# Patient Record
Sex: MALE | Race: WHITE | Employment: UNEMPLOYED | URBAN - METROPOLITAN AREA
[De-identification: names, ages, dates, MRNs, and addresses within clinical notes are randomized per-mention and may not be internally consistent; named-entity substitution may affect disease eponyms.]

---

## 2017-01-12 ENCOUNTER — OFFICE VISIT (OUTPATIENT)
Dept: INTERNAL MEDICINE CLINIC | Age: 57
End: 2017-01-12

## 2017-01-12 VITALS
TEMPERATURE: 97.5 F | OXYGEN SATURATION: 96 % | RESPIRATION RATE: 14 BRPM | SYSTOLIC BLOOD PRESSURE: 137 MMHG | WEIGHT: 199 LBS | HEIGHT: 68 IN | DIASTOLIC BLOOD PRESSURE: 95 MMHG | BODY MASS INDEX: 30.16 KG/M2 | HEART RATE: 72 BPM

## 2017-01-12 DIAGNOSIS — F17.200 SMOKER: ICD-10-CM

## 2017-01-12 DIAGNOSIS — I10 ESSENTIAL HYPERTENSION WITH GOAL BLOOD PRESSURE LESS THAN 140/90: Primary | ICD-10-CM

## 2017-01-12 DIAGNOSIS — K21.9 GASTROESOPHAGEAL REFLUX DISEASE WITHOUT ESOPHAGITIS: ICD-10-CM

## 2017-01-12 DIAGNOSIS — E78.2 HYPERLIPEMIA, MIXED: ICD-10-CM

## 2017-01-12 RX ORDER — OMEPRAZOLE 20 MG/1
20 CAPSULE, DELAYED RELEASE ORAL DAILY
Qty: 30 CAP | Refills: 5 | Status: SHIPPED | OUTPATIENT
Start: 2017-01-12 | End: 2017-03-16

## 2017-01-12 RX ORDER — OXYCODONE AND ACETAMINOPHEN 5; 325 MG/1; MG/1
1 TABLET ORAL
COMMUNITY
End: 2017-04-18

## 2017-01-12 RX ORDER — NAPROXEN SODIUM 220 MG
440 TABLET ORAL DAILY
COMMUNITY
End: 2018-05-29

## 2017-01-12 RX ORDER — FAMOTIDINE 20 MG/1
20 TABLET, FILM COATED ORAL DAILY
COMMUNITY
End: 2017-01-12 | Stop reason: ALTCHOICE

## 2017-01-12 NOTE — PROGRESS NOTES
Reviewed record In preparation for visit and have obtained necessary documentation. Has info on advanced directive but has not filled them out. 1. Have you been to the ER, urgent care clinic or hospitalized since your last visit? No     2. Have you seen or consulted any other health care providers outside of the [address] [place] [address] since your last visit? Include any pap smears or colon screening. Dr Delmer Martínez Maintenance reviewed: records for colonoscopy refaxed to Dr Oh Thompson.

## 2017-01-12 NOTE — PATIENT INSTRUCTIONS
Stop famotidine (Pepcid) and start omeprazole (Prilosec) once a day at least 30 minutes before breakfast.   Restart lisinopril/hydrochlorothiazide for blood pressure  Return in 2 months. High Blood Pressure: Care Instructions  Your Care Instructions  If your blood pressure is usually above 140/90, you have high blood pressure, or hypertension. That means the top number is 140 or higher or the bottom number is 90 or higher, or both. Despite what a lot of people think, high blood pressure usually doesn't cause headaches or make you feel dizzy or lightheaded. It usually has no symptoms. But it does increase your risk for heart attack, stroke, and kidney or eye damage. The higher your blood pressure, the more your risk increases. Your doctor will give you a goal for your blood pressure. Your goal will be based on your health and your age. An example of a goal is to keep your blood pressure below 140/90. Lifestyle changes, such as eating healthy and being active, are always important to help lower blood pressure. You might also take medicine to reach your blood pressure goal.  Follow-up care is a key part of your treatment and safety. Be sure to make and go to all appointments, and call your doctor if you are having problems. It's also a good idea to know your test results and keep a list of the medicines you take. How can you care for yourself at home? Medical treatment  · If you stop taking your medicine, your blood pressure will go back up. You may take one or more types of medicine to lower your blood pressure. Be safe with medicines. Take your medicine exactly as prescribed. Call your doctor if you think you are having a problem with your medicine. · Talk to your doctor before you start taking aspirin every day. Aspirin can help certain people lower their risk of a heart attack or stroke. But taking aspirin isn't right for everyone, because it can cause serious bleeding.   · See your doctor regularly. You may need to see the doctor more often at first or until your blood pressure comes down. · If you are taking blood pressure medicine, talk to your doctor before you take decongestants or anti-inflammatory medicine, such as ibuprofen. Some of these medicines can raise blood pressure. · Learn how to check your blood pressure at home. Lifestyle changes  · Stay at a healthy weight. This is especially important if you put on weight around the waist. Losing even 10 pounds can help you lower your blood pressure. · If your doctor recommends it, get more exercise. Walking is a good choice. Bit by bit, increase the amount you walk every day. Try for at least 30 minutes on most days of the week. You also may want to swim, bike, or do other activities. · Avoid or limit alcohol. Talk to your doctor about whether you can drink any alcohol. · Try to limit how much sodium you eat to less than 2,300 milligrams (mg) a day. Your doctor may ask you to try to eat less than 1,500 mg a day. · Eat plenty of fruits (such as bananas and oranges), vegetables, legumes, whole grains, and low-fat dairy products. · Lower the amount of saturated fat in your diet. Saturated fat is found in animal products such as milk, cheese, and meat. Limiting these foods may help you lose weight and also lower your risk for heart disease. · Do not smoke. Smoking increases your risk for heart attack and stroke. If you need help quitting, talk to your doctor about stop-smoking programs and medicines. These can increase your chances of quitting for good. When should you call for help? Call 911 anytime you think you may need emergency care. This may mean having symptoms that suggest that your blood pressure is causing a serious heart or blood vessel problem. Your blood pressure may be over 180/110. For example, call 911 if:  · You have symptoms of a heart attack.  These may include:  ¨ Chest pain or pressure, or a strange feeling in the chest.  ¨ Sweating. ¨ Shortness of breath. ¨ Nausea or vomiting. ¨ Pain, pressure, or a strange feeling in the back, neck, jaw, or upper belly or in one or both shoulders or arms. ¨ Lightheadedness or sudden weakness. ¨ A fast or irregular heartbeat. · You have symptoms of a stroke. These may include:  ¨ Sudden numbness, tingling, weakness, or loss of movement in your face, arm, or leg, especially on only one side of your body. ¨ Sudden vision changes. ¨ Sudden trouble speaking. ¨ Sudden confusion or trouble understanding simple statements. ¨ Sudden problems with walking or balance. ¨ A sudden, severe headache that is different from past headaches. · You have severe back or belly pain. Do not wait until your blood pressure comes down on its own. Get help right away. Call your doctor now or seek immediate care if:  · Your blood pressure is much higher than normal (such as 180/110 or higher), but you don't have symptoms. · You think high blood pressure is causing symptoms, such as:  ¨ Severe headache. ¨ Blurry vision. Watch closely for changes in your health, and be sure to contact your doctor if:  · Your blood pressure measures 140/90 or higher at least 2 times. That means the top number is 140 or higher or the bottom number is 90 or higher, or both. · You think you may be having side effects from your blood pressure medicine. · Your blood pressure is usually normal, but it goes above normal at least 2 times. Where can you learn more? Go to http://nehemiah-nadia.info/. Enter L283 in the search box to learn more about \"High Blood Pressure: Care Instructions. \"  Current as of: August 8, 2016  Content Version: 11.1  © 8649-2147 [facility]. Care instructions adapted under license by [employer] (which disclaims liability or warranty for this information).  If you have questions about a medical condition or this instruction, always ask your healthcare professional. [address] any warranty or liability for your use of this information.

## 2017-01-12 NOTE — MR AVS SNAPSHOT
Visit Information Date & Time Provider Department Dept. Phone Encounter #  
 1/12/2017  1:30 PM Cory Gant MD [facility] [phone] [id number] Follow-up Instructions Return in about 2 months (around 3/12/2017) for HTN, GERD, chol. Maryjo Diaz Upcoming Health Maintenance Date Due COLONOSCOPY 11/30/1977 DTaP/Tdap/Td series (2 - Td) 4/18/2026 Allergies as of 1/12/2017  Review Complete On: 1/12/2017 By: Cory Gant MD  
  
 Severity Noted Reaction Type Reactions Advil [Ibuprofen]  05/30/2011    Hives Lovastatin  01/12/2017    Diarrhea Tylenol [Acetaminophen]  05/30/2011    Hives Current Immunizations  Reviewed on 1/12/2017 Name Date Influenza Vaccine (Quad) PF 9/20/2016 Pneumococcal Polysaccharide (PPSV-23) 11/11/2016 Tdap 4/18/2016 Reviewed by Maren Valverde LPN on 6/79/4859 at  1:26 PM  
You Were Diagnosed With   
  
 Codes Comments Essential hypertension with goal blood pressure less than 140/90    -  Primary ICD-10-CM: I10 
ICD-9-CM: 401.9 Hyperlipemia, mixed     ICD-10-CM: E78.2 ICD-9-CM: 272.2 Gastroesophageal reflux disease without esophagitis     ICD-10-CM: K21.9 ICD-9-CM: 530.81 Smoker     ICD-10-CM: F19.932 ICD-9-CM: 305.1 Vitals BP Pulse Temp Resp Height(growth percentile) Weight(growth percentile) (!) 137/95 (BP 1 Location: Left arm, BP Patient Position: Sitting) 72 97.5 °F (36.4 °C) (Oral) 14 5' 8\" (1.727 m) 199 lb (90.3 kg) SpO2 BMI Smoking Status 96% 30.26 kg/m2 Current Every Day Smoker BMI and BSA Data Body Mass Index Body Surface Area  
 30.26 kg/m 2 2.08 m 2 Preferred Pharmacy Pharmacy Name Phone [facility] [address] [place], [place] - [address] [place] [address] [place] [phone] Your Updated Medication List  
  
   
This list is accurate as of: 1/12/17  2:17 PM.  Always use your most recent med list.  
  
  
  
  
 ALEVE 220 mg tablet Generic drug:  naproxen sodium Take 440 mg by mouth daily. bismuth subsalicylate 552 UR/93 mL Susp Commonly known as:  PEPTO-BISMOL MAXIMUM STRENGTH Take 30 mL by mouth every six (6) hours as needed. calcium carbonate 200 mg calcium (500 mg) Chew Commonly known as:  TUMS Take 1 Tab by mouth daily. lisinopril-hydroCHLOROthiazide 20-12.5 mg per tablet Commonly known as:  Katrinka Fat Take 1 Tab by mouth daily. omeprazole 20 mg capsule Commonly known as:  PRILOSEC Take 1 Cap by mouth daily. oxyCODONE-acetaminophen 5-325 mg per tablet Commonly known as:  PERCOCET Take 1 Tab by mouth every twelve (12) hours as needed for Pain. Prescriptions Sent to Pharmacy Refills  
 omeprazole (PRILOSEC) 20 mg capsule 5 Sig: Take 1 Cap by mouth daily. Class: Normal  
 Pharmacy: [id number] [place] [address], [address] [place] [address] #: [phone] Route: Oral  
  
Follow-up Instructions Return in about 2 months (around 3/12/2017) for HTN, GERD, chol. .  
  
  
Patient Instructions Stop famotidine (Pepcid) and start omeprazole (Prilosec) once a day at least 30 minutes before breakfast.  
Restart lisinopril/hydrochlorothiazide for blood pressure Return in 2 months. High Blood Pressure: Care Instructions Your Care Instructions If your blood pressure is usually above 140/90, you have high blood pressure, or hypertension. That means the top number is 140 or higher or the bottom number is 90 or higher, or both. Despite what a lot of people think, high blood pressure usually doesn't cause headaches or make you feel dizzy or lightheaded. It usually has no symptoms. But it does increase your risk for heart attack, stroke, and kidney or eye damage. The higher your blood pressure, the more your risk increases. Your doctor will give you a goal for your blood pressure.  Your goal will be based on your health and your age. An example of a goal is to keep your blood pressure below 140/90. Lifestyle changes, such as eating healthy and being active, are always important to help lower blood pressure. You might also take medicine to reach your blood pressure goal. 
Follow-up care is a key part of your treatment and safety. Be sure to make and go to all appointments, and call your doctor if you are having problems. It's also a good idea to know your test results and keep a list of the medicines you take. How can you care for yourself at home? Medical treatment · If you stop taking your medicine, your blood pressure will go back up. You may take one or more types of medicine to lower your blood pressure. Be safe with medicines. Take your medicine exactly as prescribed. Call your doctor if you think you are having a problem with your medicine. · Talk to your doctor before you start taking aspirin every day. Aspirin can help certain people lower their risk of a heart attack or stroke. But taking aspirin isn't right for everyone, because it can cause serious bleeding. · See your doctor regularly. You may need to see the doctor more often at first or until your blood pressure comes down. · If you are taking blood pressure medicine, talk to your doctor before you take decongestants or anti-inflammatory medicine, such as ibuprofen. Some of these medicines can raise blood pressure. · Learn how to check your blood pressure at home. Lifestyle changes · Stay at a healthy weight. This is especially important if you put on weight around the waist. Losing even 10 pounds can help you lower your blood pressure. · If your doctor recommends it, get more exercise. Walking is a good choice. Bit by bit, increase the amount you walk every day. Try for at least 30 minutes on most days of the week. You also may want to swim, bike, or do other activities. · Avoid or limit alcohol. Talk to your doctor about whether you can drink any alcohol. · Try to limit how much sodium you eat to less than 2,300 milligrams (mg) a day. Your doctor may ask you to try to eat less than 1,500 mg a day. · Eat plenty of fruits (such as bananas and oranges), vegetables, legumes, whole grains, and low-fat dairy products. · Lower the amount of saturated fat in your diet. Saturated fat is found in animal products such as milk, cheese, and meat. Limiting these foods may help you lose weight and also lower your risk for heart disease. · Do not smoke. Smoking increases your risk for heart attack and stroke. If you need help quitting, talk to your doctor about stop-smoking programs and medicines. These can increase your chances of quitting for good. When should you call for help? Call 911 anytime you think you may need emergency care. This may mean having symptoms that suggest that your blood pressure is causing a serious heart or blood vessel problem. Your blood pressure may be over 180/110. For example, call 911 if: 
· You have symptoms of a heart attack. These may include: ¨ Chest pain or pressure, or a strange feeling in the chest. 
¨ Sweating. ¨ Shortness of breath. ¨ Nausea or vomiting. ¨ Pain, pressure, or a strange feeling in the back, neck, jaw, or upper belly or in one or both shoulders or arms. ¨ Lightheadedness or sudden weakness. ¨ A fast or irregular heartbeat. · You have symptoms of a stroke. These may include: 
¨ Sudden numbness, tingling, weakness, or loss of movement in your face, arm, or leg, especially on only one side of your body. ¨ Sudden vision changes. ¨ Sudden trouble speaking. ¨ Sudden confusion or trouble understanding simple statements. ¨ Sudden problems with walking or balance. ¨ A sudden, severe headache that is different from past headaches. · You have severe back or belly pain. Do not wait until your blood pressure comes down on its own. Get help right away. Call your doctor now or seek immediate care if: 
· Your blood pressure is much higher than normal (such as 180/110 or higher), but you don't have symptoms. · You think high blood pressure is causing symptoms, such as: ¨ Severe headache. ¨ Blurry vision. Watch closely for changes in your health, and be sure to contact your doctor if: 
· Your blood pressure measures 140/90 or higher at least 2 times. That means the top number is 140 or higher or the bottom number is 90 or higher, or both. · You think you may be having side effects from your blood pressure medicine. · Your blood pressure is usually normal, but it goes above normal at least 2 times. Where can you learn more? Go to http://nehemiah-nadia.info/. Enter G934 in the search box to learn more about \"High Blood Pressure: Care Instructions. \" Current as of: August 8, 2016 Content Version: 11.1 © 3132-0828 [employer]. Care instructions adapted under license by [employer] (which disclaims liability or warranty for this information). If you have questions about a medical condition or this instruction, always ask your healthcare professional. [place] [address] any warranty or liability for your use of this information. Introducing [facility]! Chay Mclean introduces [email or web address] patient portal. Now you can access parts of your medical record, email your doctor's office, and request medication refills online. 1. In your internet browser, go to [email or web address] 2. Click on the First Time User? Click Here link in the Sign In box. You will see the New Member Sign Up page. 3. Enter your [email or web address] Access Code exactly as it appears below. You will not need to use this code after youve completed the sign-up process.  If you do not sign up before the expiration date, you must request a new code. 
 
· [employer] Access Code: [email or web address] Expires: 4/12/2017  2:15 PM 
 
4. Enter the last four digits of your Social Security Number (xxxx) and Date of Birth (mm/dd/yyyy) as indicated and click Submit. You will be taken to the next sign-up page. 5. Create a [employer] ID. This will be your [employer] login ID and cannot be changed, so think of one that is secure and easy to remember. 6. Create a [employer] password. You can change your password at any time. 7. Enter your Password Reset Question and Answer. This can be used at a later time if you forget your password. 8. Enter your e-mail address. You will receive e-mail notification when new information is available in [address] 9. Click Sign Up. You can now view and download portions of your medical record. 10. Click the Download Summary menu link to download a portable copy of your medical information. If you have questions, please visit the Frequently Asked Questions section of the [employer] website. Remember, [employer] is NOT to be used for urgent needs. For medical emergencies, dial 911. Now available from your iPhone and Android! Please provide this summary of care documentation to your next provider. Your primary care clinician is listed as Adarsh Camacho. If you have any questions after today's visit, please call [phone].

## 2017-01-12 NOTE — PROGRESS NOTES
HISTORY OF PRESENT ILLNESS  Aileen Lin is a 61 y.o. male. HPI  CARDIOVASCULAR  He presents for follow up of hypertension and hyperlipidemia. He had previously stopped taking lovastatin and lisinopril/hydrochlorothiazide due to development of diarrhea. At his last visit we recommended restarting lisinopril hydrochlorothiazide, as diarrhea would not be a side effect. He did not restart the lisinopril/ hydrochlorothiazide. The hope was to reconsider another statin today. Diet and Lifestyle: generally follows a low fat low cholesterol diet, generally follows a low sodium diet, sedentary, smoker 1 ppd   Medication compliance: did not restart lisinopril/hydrochlorothiazde. Medication side effects: none  Home BP Monitoring: is not measured at home. Cardiovascular ROS:  He denies palpitations, orthopnea, exertional chest pressure/discomfort, claudication, lower extremity edema, dyspnea on exertion, dizziness     GERD  He presents for follow up of gastroesophageal reflux. This has been associated with heartburn. Symptoms not controlled on Pepcid. Adding Tums prn. He denies dysphagia, has not lost weight. He continues to complain of pain in the left leg. He previously sustained a compound comminuted fracture of the tibia when he fell from a ladder. Had surgery for this twice. His orthopedic doctor is no longer prescribing pain medication for him. And he is asking that we do so.     Patient Active Problem List   Diagnosis Code    Adenoma of right adrenal gland D35.01    Pulmonary nodules/lesions, multiple R91.8    Smoker F17.200    GERD (gastroesophageal reflux disease) K21.9    Osteoarthritis of left knee M17.9    COPD (chronic obstructive pulmonary disease) ([facility] [address] J44.9    Depression with anxiety F41.8    Hyperlipemia, mixed E78.2    Essential hypertension with goal blood pressure less than 140/90 I10     Past Medical History   Diagnosis Date    Adrenal mass, right ([address] 2014     Seen on abd CT  CAD (coronary artery disease)      calcific atherosclerosis on CT    Hypercholesterolemia     Hypertension     Multiple lung nodules      Past Surgical History   Procedure Laterality Date    Hx orthopaedic       hand    Hx orthopaedic       foot     Social History     Social History    Marital status: SINGLE     Spouse name: N/A    Number of children: N/A    Years of education: N/A     Social History Main Topics    Smoking status: Current Every Day Smoker     Packs/day: 1.00     Types: Cigarettes    Smokeless tobacco: Never Used    Alcohol use No      Comment: stopped jan 2014    Drug use: No    Sexual activity: Not Asked     Other Topics Concern    None     Social History Narrative     Family History   Problem Relation Age of Onset    Heart Disease Mother     Asthma Mother     Elevated Lipids Mother     Hypertension Mother      Allergies   Allergen Reactions    Advil [Ibuprofen] Hives    Lovastatin Diarrhea    Tylenol [Acetaminophen] Hives     Current Outpatient Prescriptions   Medication Sig Dispense Refill    oxyCODONE-acetaminophen (PERCOCET) 5-325 mg per tablet Take 1 Tab by mouth every twelve (12) hours as needed for Pain.  famotidine (PEPCID) 20 mg tablet Take 20 mg by mouth daily.  bismuth subsalicylate (PEPTO-BISMOL MAXIMUM STRENGTH) 525 mg/15 mL susp Take 30 mL by mouth every six (6) hours as needed.  calcium carbonate (TUMS) 200 mg calcium (500 mg) chew Take 1 Tab by mouth daily.  lisinopril-hydrochlorothiazide (PRINZIDE, ZESTORETIC) 20-12.5 mg per tablet Take 1 Tab by mouth daily. 90 Tab 3               Review of Systems   Constitutional: Negative for malaise/fatigue and weight loss. Gastrointestinal: Negative for constipation, diarrhea and heartburn. Musculoskeletal: Negative for back pain and joint pain. Pain in left leg. Constant, burning pain in lower leg. Ortho gave him Percocet with instructions to wean.     Neurological: Positive for tingling (and burning in lower left leg.). Negative for dizziness and focal weakness. Visit Vitals    BP (!) 137/95 (BP 1 Location: Left arm, BP Patient Position: Sitting)    Pulse 72    Temp 97.5 °F (36.4 °C) (Oral)    Resp 14    Ht 5' 8\" (1.727 m)    Wt 199 lb (90.3 kg)    SpO2 96%    BMI 30.26 kg/m2     Physical Exam   Constitutional: He is oriented to person, place, and time. He appears well-developed and well-nourished. HENT:   Head: Normocephalic and atraumatic. Eyes: Conjunctivae are normal. Pupils are equal, round, and reactive to light. Neck: Neck supple. Carotid bruit is not present. No thyromegaly present. Cardiovascular: Normal rate, regular rhythm and normal heart sounds. PMI is not displaced. Exam reveals no gallop. No murmur heard. Pulses:       Dorsalis pedis pulses are 2+ on the right side, and 2+ on the left side. Posterior tibial pulses are 2+ on the right side, and 2+ on the left side. Pulmonary/Chest: Effort normal. He has no wheezes. He has no rhonchi. He has no rales. Abdominal: Soft. Normal appearance. He exhibits no abdominal bruit and no mass. There is no hepatosplenomegaly. There is no tenderness. Musculoskeletal: He exhibits no edema. Lymphadenopathy:     He has no cervical adenopathy. Right: No supraclavicular adenopathy present. Left: No inguinal and no supraclavicular adenopathy present. Neurological: He is alert and oriented to person, place, and time. No sensory deficit. Skin: Skin is warm, dry and intact. No rash noted. Psychiatric: He has a normal mood and affect. His behavior is normal.   Nursing note and vitals reviewed.     Results for orders placed or performed in visit on 57/22/85   METABOLIC PANEL, BASIC   Result Value Ref Range    Glucose 114 (H) 65 - 99 mg/dL    BUN 10 6 - 24 mg/dL    Creatinine 0.69 (L) 0.76 - 1.27 mg/dL    GFR est non- >59 mL/min/1.73    GFR est  >59 mL/min/1.73    BUN/Creatinine ratio 14 9 - 20    Sodium 142 134 - 144 mmol/L    Potassium 3.9 3.5 - 5.2 mmol/L    Chloride 104 97 - 108 mmol/L    CO2 22 18 - 29 mmol/L    Calcium 9.3 8.7 - 10.2 mg/dL   LIPID PANEL   Result Value Ref Range    Cholesterol, total 236 (H) 100 - 199 mg/dL    Triglyceride 127 0 - 149 mg/dL    HDL Cholesterol 41 >39 mg/dL    VLDL, calculated 25 5 - 40 mg/dL    LDL, calculated 170 (H) 0 - 99 mg/dL   CVD REPORT   Result Value Ref Range    INTERPRETATION Note        ASSESSMENT and PLAN    ICD-10-CM ICD-9-CM    1. Essential hypertension with goal blood pressure less than 140/90 I10 401.9    2. Hyperlipemia, mixed E78.2 272.2    3. Gastroesophageal reflux disease without esophagitis K21.9 530.81    4. Smoker F17.200 305.1      Bernie Dimas was seen today for blood pressure check and cholesterol problem. Diagnoses and all orders for this visit:    Essential hypertension with goal blood pressure less than 140/90  Hypertension is uncontrolled - medication changes/orders  Restart lisinopril/hydrochlorothiazide. Hyperlipemia, mixed  Hyperlipidemia is uncontrolled - medication changes/orders  Once we are sure he tolerates lisinopril hydrochlorothiazide with no problems, will consider a different statin such as pravastatin. Gastroesophageal reflux disease without esophagitis          -     omeprazole (PRILOSEC) 20 mg capsule; Take 1 Cap by mouth daily. Smoker  Urged to stop smoking to decrease cardiovascular, pulmonary and cancer risk             Follow-up Disposition:  Return in about 2 months (around 3/12/2017) for HTN, GERD, chol. .  lab results and schedule of future lab studies reviewed with patient  reviewed diet, exercise and weight control  cardiovascular risk and specific lipid/LDL goals reviewed  The pain situation and his leg is a difficult one. We have not received records from his list orthopedic visits.   I do not know if his experiencing pain at this point in recovery is normal.  Even before the leg injury he had requested pain medicine for other reasons such as elbow pain. The records we received previously were not been scanned onto his chart. We will need to request them again. He is having problems with Medicaid. He has not received a card, although his  tells him that he has Medicaid. Lack of insurance coverage makes treatment with other medications, such as duloxetine or gabapentin, more difficult due to expense. I have discussed the diagnosis with the patient and the intended plan as seen in the above orders. Patient is in agreement. The patient has received an after-visit summary and questions were answered concerning future plans. I have discussed medication side effects and warnings with the patient as well.

## 2017-03-16 ENCOUNTER — OFFICE VISIT (OUTPATIENT)
Dept: INTERNAL MEDICINE CLINIC | Age: 57
End: 2017-03-16

## 2017-03-16 VITALS
RESPIRATION RATE: 14 BRPM | OXYGEN SATURATION: 97 % | HEIGHT: 68 IN | SYSTOLIC BLOOD PRESSURE: 161 MMHG | TEMPERATURE: 97.1 F | DIASTOLIC BLOOD PRESSURE: 89 MMHG | HEART RATE: 69 BPM | WEIGHT: 194 LBS | BODY MASS INDEX: 29.4 KG/M2

## 2017-03-16 DIAGNOSIS — F17.200 SMOKER: ICD-10-CM

## 2017-03-16 DIAGNOSIS — K21.9 GASTROESOPHAGEAL REFLUX DISEASE WITHOUT ESOPHAGITIS: ICD-10-CM

## 2017-03-16 DIAGNOSIS — J44.9 CHRONIC OBSTRUCTIVE PULMONARY DISEASE, UNSPECIFIED COPD TYPE (HCC): ICD-10-CM

## 2017-03-16 DIAGNOSIS — F41.8 DEPRESSION WITH ANXIETY: ICD-10-CM

## 2017-03-16 DIAGNOSIS — I10 ESSENTIAL HYPERTENSION WITH GOAL BLOOD PRESSURE LESS THAN 140/90: Primary | ICD-10-CM

## 2017-03-16 RX ORDER — TRAMADOL HYDROCHLORIDE 50 MG/1
TABLET ORAL
Refills: 2 | COMMUNITY
Start: 2017-03-04 | End: 2017-04-18

## 2017-03-16 RX ORDER — LISINOPRIL AND HYDROCHLOROTHIAZIDE 12.5; 2 MG/1; MG/1
1 TABLET ORAL DAILY
Qty: 90 TAB | Refills: 3 | Status: SHIPPED | OUTPATIENT
Start: 2017-03-16 | End: 2017-04-14 | Stop reason: SDUPTHER

## 2017-03-16 RX ORDER — FAMOTIDINE 20 MG/1
20 TABLET, FILM COATED ORAL 2 TIMES DAILY
Qty: 60 TAB | Refills: 5 | Status: SHIPPED | OUTPATIENT
Start: 2017-03-16 | End: 2017-08-31

## 2017-03-16 RX ORDER — FLUOXETINE 10 MG/1
10 CAPSULE ORAL DAILY
Qty: 30 CAP | Refills: 5 | Status: SHIPPED | OUTPATIENT
Start: 2017-03-16 | End: 2017-04-18

## 2017-03-16 NOTE — PATIENT INSTRUCTIONS
1. Start lisinopril/hydrochlorothiazide 20/12.5 mg once daily. 2. Start famotidine 20 mg twice a day, 30 minutes before breakfast and supper. For  Your stomach and acid. 3. Start fluoxetine 10 mg once a day for nerves  Give 3-5 days between starting new medications. Return in one month. Gastroesophageal Reflux Disease (GERD): Care Instructions  Your Care Instructions    Gastroesophageal reflux disease (GERD) is the backward flow of stomach acid into the esophagus. The esophagus is the tube that leads from your throat to your stomach. A one-way valve prevents the stomach acid from moving up into this tube. When you have GERD, this valve does not close tightly enough. If you have mild GERD symptoms including heartburn, you may be able to control the problem with antacids or over-the-counter medicine. Changing your diet, losing weight, and making other lifestyle changes can also help reduce symptoms. Follow-up care is a key part of your treatment and safety. Be sure to make and go to all appointments, and call your doctor if you are having problems. Its also a good idea to know your test results and keep a list of the medicines you take. How can you care for yourself at home? · Take your medicines exactly as prescribed. Call your doctor if you think you are having a problem with your medicine. · Your doctor may recommend over-the-counter medicine. For mild or occasional indigestion, antacids, such as Tums, Gaviscon, Mylanta, or Maalox, may help. Your doctor also may recommend over-the-counter acid reducers, such as Pepcid AC, Tagamet HB, Zantac 75, or Prilosec. Read and follow all instructions on the label. If you use these medicines often, talk with your doctor. · Change your eating habits. ¨ Its best to eat several small meals instead of two or three large meals. ¨ After you eat, wait 2 to 3 hours before you lie down. ¨ Chocolate, mint, and alcohol can make GERD worse.   ¨ Spicy foods, foods that have a lot of acid (like tomatoes and oranges), and coffee can make GERD symptoms worse in some people. If your symptoms are worse after you eat a certain food, you may want to stop eating that food to see if your symptoms get better. · Do not smoke or chew tobacco. Smoking can make GERD worse. If you need help quitting, talk to your doctor about stop-smoking programs and medicines. These can increase your chances of quitting for good. · If you have GERD symptoms at night, raise the head of your bed 6 to 8 inches by putting the frame on blocks or placing a foam wedge under the head of your mattress. (Adding extra pillows does not work.)  · Do not wear tight clothing around your middle. · Lose weight if you need to. Losing just 5 to 10 pounds can help. When should you call for help? Call your doctor now or seek immediate medical care if:  · You have new or different belly pain. · Your stools are black and tarlike or have streaks of blood. Watch closely for changes in your health, and be sure to contact your doctor if:  · Your symptoms have not improved after 2 days. · Food seems to catch in your throat or chest.  Where can you learn more? Go to http://nehemiah-nadia.info/. Enter Z114 in the search box to learn more about \"Gastroesophageal Reflux Disease (GERD): Care Instructions. \"  Current as of: August 9, 2016  Content Version: 11.1  © 5657-7000 [employer]. Care instructions adapted under license by [employer] (which disclaims liability or warranty for this information). If you have questions about a medical condition or this instruction, always ask your healthcare professional. [address] any warranty or liability for your use of this information.

## 2017-03-16 NOTE — MR AVS SNAPSHOT
Visit Information Date & Time Provider Department Dept. Phone Encounter #  
 3/16/2017 10:00 AM Sathish SiegelMegan [phone] [id number] Follow-up Instructions Return in about 1 month (around 4/16/2017) for HTN, anx, GERD   (May use acute). Upcoming Health Maintenance Date Due COLONOSCOPY 11/30/1977 DTaP/Tdap/Td series (2 - Td) 4/18/2026 Allergies as of 3/16/2017  Review Complete On: 3/16/2017 By: Sathish Siegel MD  
  
 Severity Noted Reaction Type Reactions Advil [Ibuprofen]  05/30/2011    Hives Lovastatin  01/12/2017    Diarrhea Tylenol [Acetaminophen]  05/30/2011    Hives Current Immunizations  Reviewed on 3/16/2017 Name Date Influenza Vaccine (Quad) PF 9/20/2016 Pneumococcal Polysaccharide (PPSV-23) 11/11/2016 Tdap 4/18/2016 Reviewed by Irvin Rodrigez LPN on 5/40/4718 at 44:68 AM  
You Were Diagnosed With   
  
 Codes Comments Essential hypertension with goal blood pressure less than 140/90    -  Primary ICD-10-CM: I10 
ICD-9-CM: 401.9 Gastroesophageal reflux disease without esophagitis     ICD-10-CM: K21.9 ICD-9-CM: 530.81 Depression with anxiety     ICD-10-CM: F41.8 ICD-9-CM: 300.4 Chronic obstructive pulmonary disease, unspecified [facility]     ICD-10-CM: J44.9 ICD-9-CM: 472 Smoker     ICD-10-CM: Z02.028 ICD-9-CM: 305.1 Vitals BP Pulse Temp Resp Height(growth percentile) Weight(growth percentile) 161/89 (BP 1 Location: Left arm, BP Patient Position: Sitting) 69 97.1 °F (36.2 °C) (Oral) 14 5' 8\" (1.727 m) 194 lb (88 kg) SpO2 BMI Smoking Status 97% 29.5 kg/m2 Current Every Day Smoker BMI and BSA Data Body Mass Index Body Surface Area  
 29.5 kg/m 2 2.05 m 2 Preferred Pharmacy Pharmacy Name Phone [facility] [address] [place], [place] - [address] [facility] Loraine Primes [phone] Your Updated Medication List  
  
 This list is accurate as of: 3/16/17 10:52 AM.  Always use your most recent med list.  
  
  
  
  
 ALEVE 220 mg tablet Generic drug:  naproxen sodium Take 440 mg by mouth daily. calcium carbonate 200 mg calcium (500 mg) Chew Commonly known as:  TUMS Take 1 Tab by mouth daily. famotidine 20 mg tablet Commonly known as:  PEPCID Take 1 Tab by mouth two (2) times a day. FLUoxetine 10 mg capsule Commonly known as:  PROzac Take 1 Cap by mouth daily. lisinopril-hydroCHLOROthiazide 20-12.5 mg per tablet Commonly known as:  Sandra Mcardle Take 1 Tab by mouth daily. oxyCODONE-acetaminophen 5-325 mg per tablet Commonly known as:  PERCOCET Take 1 Tab by mouth every twelve (12) hours as needed for Pain.  
  
 traMADol 50 mg tablet Commonly known as:  ULTRAM  
TAKE ONE TABLET BY MOUTH EVERY 8 HOURS AS NEEDED FOR PAIN Prescriptions Sent to Pharmacy Refills  
 lisinopril-hydroCHLOROthiazide (PRINZIDE, ZESTORETIC) 20-12.5 mg per tablet 3 Sig: Take 1 Tab by mouth daily. Class: Normal  
 Pharmacy: [facility] [address] [place] [address] Ph #: [phone] Route: Oral  
 FLUoxetine (PROZAC) 10 mg capsule 5 Sig: Take 1 Cap by mouth daily. Class: Normal  
 Pharmacy: [id number] [place] [address] Ph #: [phone] Route: Oral  
 famotidine (PEPCID) 20 mg tablet 5 Sig: Take 1 Tab by mouth two (2) times a day. Class: Normal  
 Pharmacy: [id number] [place] [address] Ph #: [phone] Route: Oral  
  
Follow-up Instructions Return in about 1 month (around 4/16/2017) for HTN, anx, GERD   (May use acute). Patient Instructions 1. Start lisinopril/hydrochlorothiazide 20/12.5 mg once daily. 2. Start famotidine 20 mg twice a day, 30 minutes before breakfast and supper. For  Your stomach and acid. 3. Start fluoxetine 10 mg once a day for nerves Give 3-5 days between starting new medications. Return in one month. Gastroesophageal Reflux Disease (GERD): Care Instructions Your Care Instructions Gastroesophageal reflux disease (GERD) is the backward flow of stomach acid into the esophagus. The esophagus is the tube that leads from your throat to your stomach. A one-way valve prevents the stomach acid from moving up into this tube. When you have GERD, this valve does not close tightly enough. If you have mild GERD symptoms including heartburn, you may be able to control the problem with antacids or over-the-counter medicine. Changing your diet, losing weight, and making other lifestyle changes can also help reduce symptoms. Follow-up care is a key part of your treatment and safety. Be sure to make and go to all appointments, and call your doctor if you are having problems. Its also a good idea to know your test results and keep a list of the medicines you take. How can you care for yourself at home? · Take your medicines exactly as prescribed. Call your doctor if you think you are having a problem with your medicine. · Your doctor may recommend over-the-counter medicine. For mild or occasional indigestion, antacids, such as Tums, Gaviscon, Mylanta, or Maalox, may help. Your doctor also may recommend over-the-counter acid reducers, such as Pepcid AC, Tagamet HB, Zantac 75, or Prilosec. Read and follow all instructions on the label. If you use these medicines often, talk with your doctor. · Change your eating habits. ¨ Its best to eat several small meals instead of two or three large meals. ¨ After you eat, wait 2 to 3 hours before you lie down. ¨ Chocolate, mint, and alcohol can make GERD worse. ¨ Spicy foods, foods that have a lot of acid (like tomatoes and oranges), and coffee can make GERD symptoms worse in some people.  If your symptoms are worse after you eat a certain food, you may want to stop eating that food to see if your symptoms get better. · Do not smoke or chew tobacco. Smoking can make GERD worse. If you need help quitting, talk to your doctor about stop-smoking programs and medicines. These can increase your chances of quitting for good. · If you have GERD symptoms at night, raise the head of your bed 6 to 8 inches by putting the frame on blocks or placing a foam wedge under the head of your mattress. (Adding extra pillows does not work.) · Do not wear tight clothing around your middle. · Lose weight if you need to. Losing just 5 to 10 pounds can help. When should you call for help? Call your doctor now or seek immediate medical care if: 
· You have new or different belly pain. · Your stools are black and tarlike or have streaks of blood. Watch closely for changes in your health, and be sure to contact your doctor if: 
· Your symptoms have not improved after 2 days. · Food seems to catch in your throat or chest. 
Where can you learn more? Go to http://nehemiah-nadia.info/. Enter Z941 in the search box to learn more about \"Gastroesophageal Reflux Disease (GERD): Care Instructions. \" Current as of: August 9, 2016 Content Version: 11.1 © 8296-6198 [employer]. Care instructions adapted under license by [employer] (which disclaims liability or warranty for this information). If you have questions about a medical condition or this instruction, always ask your healthcare professional. [place] [address] any warranty or liability for your use of this information. Introducing [facility]! Jairon Caicedo introduces [employer] patient portal. Now you can access parts of your medical record, email your doctor's office, and request medication refills online. 1. In your internet browser, go to [email or web address] 2. Click on the First Time User? Click Here link in the Sign In box. You will see the New Member Sign Up page. 3. Enter your Your Survival Access Code exactly as it appears below. You will not need to use this code after youve completed the sign-up process. If you do not sign up before the expiration date, you must request a new code. · Your Survival Access Code: [email or web address] Expires: 4/12/2017  3:15 PM 
 
4. Enter the last four digits of your Social Security Number (xxxx) and Date of Birth (mm/dd/yyyy) as indicated and click Submit. You will be taken to the next sign-up page. 5. Create a Your Survival ID. This will be your Your Survival login ID and cannot be changed, so think of one that is secure and easy to remember. 6. Create a Your Survival password. You can change your password at any time. 7. Enter your Password Reset Question and Answer. This can be used at a later time if you forget your password. 8. Enter your e-mail address. You will receive e-mail notification when new information is available in [address] 9. Click Sign Up. You can now view and download portions of your medical record. 10. Click the Download Summary menu link to download a portable copy of your medical information. If you have questions, please visit the Frequently Asked Questions section of the Your Survival website. Remember, Your Survival is NOT to be used for urgent needs. For medical emergencies, dial 911. Now available from your iPhone and Android! Please provide this summary of care documentation to your next provider. Your primary care clinician is listed as Calli Martin. If you have any questions after today's visit, please call [phone].

## 2017-03-16 NOTE — PROGRESS NOTES
HISTORY OF PRESENT ILLNESS  Jazmin Busby is a 61 y.o. male. HPI  He presents for follow up of hypertension and hyperlipidemia. He has not filled prescription for lisinopril/hydrochlorothiazide. He is currently not taking a cholesterol-lowering medication, because he had diarrhea when taking lovastatin. I plan had been to start a different statin if he tolerated restarting lisinopril/hydrochlorothiazide. He has lost 9.5 pounds since his last visit about 2 months ago. He does have access to adequate food, and thinks the weight loss is due to being nervous all the time. Diet and Lifestyle: generally follows a low fat low cholesterol diet, generally follows a low sodium diet, sedentary, smoker 1 ppd   Medication compliance: noncompliant much of the time  Medication side effects: none  Home BP Monitoring: is not measured at home. Cardiovascular ROS:  He complains of palpitations, left leg swelling. He denies orthopnea, exertional chest pressure/discomfort, claudication, dyspnea on exertion, dizziness    He presents for follow up of gastroesophageal reflux. He is currently treating this with famotidine on a as needed basis. Current symptoms include heartburn, nocturnal burning, regurgitation of undigested food, symptoms primarily relate to meals, and lying down after meals, upper abdominal discomfort and waterbrash. He denies dysphagia, has had a weight loss of 9.5 pounds over a period of 4. He presents for follow up of anxiety. Ongoing symptoms include: racing thoughts, worries al lot, feels nervous. .   Patient denies: insomnia, psychomotor agitation, feelings of losing control, difficulty concentrating. He is being seen for follow up of COPD, not currently in exacerbation. taking medications as instructed, no medication side effects noted, no significant ongoing wheezing or shortness of breath, using bronchodilator MDI less than twice a week. Hehas shortness of breath none.    Uses rescue inhaler:0  times /week  Smoking: packs, 1 per day    He continues to use crutches due to pain in his left knee region. He previously sustained a compound comminuted fracture of the tibia when he fell from a ladder. Had surgery for this twice. His orthopedic doctor is no longer prescribing pain medication for him. And he is asking that we do so.     Patient Active Problem List   Diagnosis Code    Adenoma of right adrenal gland D35.01    Pulmonary nodules/lesions, multiple R91.8    Smoker F17.200    GERD (gastroesophageal reflux disease) K21.9    Osteoarthritis of left knee M17.9    COPD (chronic obstructive pulmonary disease) ([facility] J44.9    Depression with anxiety F41.8    Hyperlipemia, mixed E78.2    Essential hypertension with goal blood pressure less than 140/90 I10     Past Medical History:   Diagnosis Date    Adrenal mass, right ([facility] 2014    Seen on abd CT    CAD (coronary artery disease)     calcific atherosclerosis on CT    Hypercholesterolemia     Hypertension     Multiple lung nodules      Past Surgical History:   Procedure Laterality Date    HX ORTHOPAEDIC      hand    HX ORTHOPAEDIC      foot     Social History     Social History    Marital status: SINGLE     Spouse name: N/A    Number of children: N/A    Years of education: N/A     Social History Main Topics    Smoking status: Current Every Day Smoker     Packs/day: 1.00     Types: Cigarettes    Smokeless tobacco: Never Used    Alcohol use No      Comment: stopped jan 2014    Drug use: No    Sexual activity: Not Asked     Other Topics Concern    None     Social History Narrative     Family History   Problem Relation Age of Onset    Heart Disease Mother     Asthma Mother     Elevated Lipids Mother     Hypertension Mother      Allergies   Allergen Reactions    Advil [Ibuprofen] Hives    Lovastatin Diarrhea    Tylenol [Acetaminophen] Hives     Current Outpatient Prescriptions   Medication Sig Dispense Refill    traMADol (ULTRAM) 50 mg tablet TAKE ONE TABLET BY MOUTH EVERY 8 HOURS AS NEEDED FOR PAIN  2    oxyCODONE-acetaminophen (PERCOCET) 5-325 mg per tablet Take 1 Tab by mouth every twelve (12) hours as needed for Pain.  naproxen sodium (ALEVE) 220 mg tablet Take 440 mg by mouth daily.  calcium carbonate (TUMS) 200 mg calcium (500 mg) chew Take 1 Tab by mouth daily. Review of Systems   Constitutional: Negative for malaise/fatigue and weight loss. Gastrointestinal: Negative for constipation, diarrhea and heartburn. Musculoskeletal: Negative for back pain and joint pain. Neurological: Negative for dizziness, tingling and focal weakness. Visit Vitals    /89 (BP 1 Location: Left arm, BP Patient Position: Sitting)    Pulse 69    Temp 97.1 °F (36.2 °C) (Oral)    Resp 14    Ht 5' 8\" (1.727 m)    Wt 194 lb (88 kg)    SpO2 97%    BMI 29.5 kg/m2     Physical Exam   Constitutional: He is oriented to person, place, and time. He appears well-developed and well-nourished. HENT:   Head: Normocephalic and atraumatic. Eyes: Conjunctivae are normal. Pupils are equal, round, and reactive to light. Neck: Neck supple. Carotid bruit is not present. No thyromegaly present. Cardiovascular: Normal rate, regular rhythm and normal heart sounds. PMI is not displaced. Exam reveals no gallop. No murmur heard. Pulses:       Dorsalis pedis pulses are 2+ on the right side, and 2+ on the left side. Posterior tibial pulses are 2+ on the right side, and 2+ on the left side. Pulmonary/Chest: Effort normal. He has no wheezes. He has no rhonchi. He has no rales. Abdominal: Soft. Normal appearance. He exhibits no abdominal bruit and no mass. There is no hepatosplenomegaly. There is no tenderness. Musculoskeletal: He exhibits no edema. Lymphadenopathy:     He has no cervical adenopathy. Right: No supraclavicular adenopathy present.         Left: No inguinal and no supraclavicular adenopathy present. Neurological: He is alert and oriented to person, place, and time. No sensory deficit. Skin: Skin is warm, dry and intact. No rash noted. Psychiatric: He has a normal mood and affect. His behavior is normal.   Nursing note and vitals reviewed. Lab Results   Component Value Date/Time    Sodium 142 09/13/2016 08:22 AM    Potassium 3.9 09/13/2016 08:22 AM    Chloride 104 09/13/2016 08:22 AM    CO2 22 09/13/2016 08:22 AM    Anion gap 9 01/14/2014 11:35 AM    Glucose 114 09/13/2016 08:22 AM    BUN 10 09/13/2016 08:22 AM    Creatinine 0.69 09/13/2016 08:22 AM    BUN/Creatinine ratio 14 09/13/2016 08:22 AM    GFR est  09/13/2016 08:22 AM    GFR est non- 09/13/2016 08:22 AM    Calcium 9.3 09/13/2016 08:22 AM     Lab Results   Component Value Date/Time    Cholesterol, total 236 09/13/2016 08:22 AM    HDL Cholesterol 41 09/13/2016 08:22 AM    LDL, calculated 170 09/13/2016 08:22 AM    VLDL, calculated 25 09/13/2016 08:22 AM    Triglyceride 127 09/13/2016 08:22 AM         ASSESSMENT and PLAN    ICD-10-CM ICD-9-CM    1. Essential hypertension with goal blood pressure less than 140/90 I10 401.9 lisinopril-hydroCHLOROthiazide (PRINZIDE, ZESTORETIC) 20-12.5 mg per tablet   2. Gastroesophageal reflux disease without esophagitis K21.9 530.81 famotidine (PEPCID) 20 mg tablet   3. Depression with anxiety F41.8 300.4 FLUoxetine (PROZAC) 10 mg capsule   4. Chronic obstructive pulmonary disease, unspecified COPD type ([facility] J44.9 496    5. Smoker F17.200 305.1          Essential hypertension with goal blood pressure less than 140/90  -     lisinopril-hydroCHLOROthiazide (PRINZIDE, ZESTORETIC) 20-12.5 mg per tablet; Take 1 Tab by mouth daily. Gastroesophageal reflux disease without esophagitis  -     famotidine (PEPCID) 20 mg tablet; Take 1 Tab by mouth two (2) times a day. Depression with anxiety  -     FLUoxetine (PROZAC) 10 mg capsule; Take 1 Cap by mouth daily.     Chronic obstructive pulmonary disease, unspecified COPD type ([facility]    Smoker  Encouraged to stop smoking to decrease risk of cancer, pulmonary, and cardiovascular disease. Follow-up Disposition:  Return in about 1 month (around 4/16/2017) for HTN, anx, GERD   (May use acute). lab results and schedule of future lab studies reviewed with patient  reviewed diet, exercise and weight control  Compliance is a major issue for him. He has told me that his Medicaid should be an effect by April 1. He assures me this will make compliance much easier. Discussed the patient's BMI with her. The BMI follow up plan is as follows: I have counseled this patient on diet and exercise regimens  I have discussed the diagnosis with the patient and the intended plan as seen in the above orders. Patient is in agreement. The patient has received an after-visit summary and questions were answered concerning future plans. I have discussed medication side effects and warnings with the patient as well.

## 2017-03-16 NOTE — PROGRESS NOTES
Reviewed record In preparation for visit and have obtained necessary documentation. Has info on advanced directive but has not filled them out. 1. Have you been to the ER, urgent care clinic or hospitalized since your last visit? No     2. Have you seen or consulted any other health care providers outside of the [facility] since your last visit? Include any pap smears or colon screening.  No    Health Maintenance reviewed:

## 2017-03-19 PROBLEM — Z91.14 NONCOMPLIANCE WITH MEDICATION REGIMEN: Status: ACTIVE | Noted: 2017-03-19

## 2017-04-18 ENCOUNTER — OFFICE VISIT (OUTPATIENT)
Dept: INTERNAL MEDICINE CLINIC | Age: 57
End: 2017-04-18

## 2017-04-18 VITALS
OXYGEN SATURATION: 98 % | SYSTOLIC BLOOD PRESSURE: 150 MMHG | WEIGHT: 194 LBS | HEIGHT: 68 IN | RESPIRATION RATE: 16 BRPM | DIASTOLIC BLOOD PRESSURE: 99 MMHG | HEART RATE: 92 BPM | TEMPERATURE: 97.6 F | BODY MASS INDEX: 29.4 KG/M2

## 2017-04-18 DIAGNOSIS — I10 ESSENTIAL HYPERTENSION WITH GOAL BLOOD PRESSURE LESS THAN 140/90: Primary | ICD-10-CM

## 2017-04-18 DIAGNOSIS — E78.2 HYPERLIPEMIA, MIXED: ICD-10-CM

## 2017-04-18 DIAGNOSIS — M79.605 CHRONIC PAIN OF LEFT LOWER EXTREMITY: ICD-10-CM

## 2017-04-18 DIAGNOSIS — R73.9 HYPERGLYCEMIA: ICD-10-CM

## 2017-04-18 DIAGNOSIS — K21.9 GASTROESOPHAGEAL REFLUX DISEASE WITHOUT ESOPHAGITIS: ICD-10-CM

## 2017-04-18 DIAGNOSIS — F41.8 DEPRESSION WITH ANXIETY: ICD-10-CM

## 2017-04-18 DIAGNOSIS — F17.200 SMOKER: ICD-10-CM

## 2017-04-18 DIAGNOSIS — G89.29 CHRONIC PAIN OF LEFT LOWER EXTREMITY: ICD-10-CM

## 2017-04-18 DIAGNOSIS — Z87.81 HISTORY OF TIBIAL FRACTURE: ICD-10-CM

## 2017-04-18 RX ORDER — VENLAFAXINE HYDROCHLORIDE 37.5 MG/1
CAPSULE, EXTENDED RELEASE ORAL
Qty: 7 CAP | Refills: 0 | Status: SHIPPED | OUTPATIENT
Start: 2017-04-18 | End: 2017-08-31

## 2017-04-18 RX ORDER — LISINOPRIL AND HYDROCHLOROTHIAZIDE 12.5; 2 MG/1; MG/1
2 TABLET ORAL DAILY
Qty: 180 TAB | Refills: 1 | Status: SHIPPED | OUTPATIENT
Start: 2017-04-18 | End: 2018-05-29 | Stop reason: SDUPTHER

## 2017-04-18 RX ORDER — VENLAFAXINE HYDROCHLORIDE 75 MG/1
75 CAPSULE, EXTENDED RELEASE ORAL DAILY
Qty: 30 CAP | Refills: 5 | Status: SHIPPED | OUTPATIENT
Start: 2017-04-18 | End: 2017-08-31

## 2017-04-18 NOTE — PROGRESS NOTES
Patient received paperwork for advance directive during previous visit but has not completed at this time     Reviewed record In preparation for visit and have obtained necessary documentation      1. Have you been to the ER, urgent care clinic since your last visit? Hospitalized since your last visit? NO    2. Have you seen or consulted any other health care providers outside of the [address] [place] [address] since your last visit? Include any pap smears or colon screening.  NO

## 2017-04-18 NOTE — PATIENT INSTRUCTIONS
Increase lisinopril/hydrochlorothiazide to 2 tablets daily  Start venlafaxine XR 37.5 mg once a day for one week. Then take 75 mg daily  Restart famotidine. Do not take Aleve and Advil together and do not take more than 2 Aleve twice a day. Take with food. Make appointments with gastroenterology and physical medicine and rehab. Return in one month         Developing a Pain Management Plan: Care Instructions  Your Care Instructions  Some diseases and injuries can cause pain that lasts a long time. You don't need to live with uncontrolled pain. A pain management plan helps you find ways to control pain with side effects you can live with. There are things you can do to help with pain. Only you know how much pain you feel. Constant pain can make you depressed. It can cause stress and make it hard for you to eat and sleep. But there are ways to control the pain. They can help you stay active, improve your mood, and heal faster. Your plan can include many types of pain control. You may take prescription or over-the-counter drugs. You can also try physical treatments and behavioral methods. Some medical treatments also help with pain. For example, radiation can be used to reduce pain from bone cancer. You and your doctor will work to make your plan. Be sure to read it often. Change it if your pain is not under control. Follow-up care is a key part of your treatment and safety. Be sure to make and go to all appointments, and call your doctor if you are having problems. It's also a good idea to know your test results and keep a list of the medicines you take. How can you care for yourself at home? Physical treatments  · Be safe with medicines. Take pain medicines exactly as directed. ¨ If the doctor gave you a prescription medicine for pain, take it exactly as prescribed. ¨ If you are not taking a prescription pain medicine, ask your doctor if you can take an over-the-counter medicine.   · If your pain medicine causes side effects such as constipation or nausea, you may need to take other medicines for those problems. Talk to your doctor about any side effects you have. · Hot or cold compresses applied directly to the skin can help sore muscles. Put ice or a cold pack on the painful area for 10 to 20 minutes at a time. Put a thin cloth between the ice and your skin. You may find that it helps to switch between cold and heat. Try a heating pad set on low or a warm cloth on the area for 10 to 15 minutes at a time. · Hydrotherapy uses flowing water to relax muscles. You may want to sit in a hot tub or a steam bath. Or use a shower or a sitz bath to help your pain. · Massage therapy is rubbing the soft tissues of the body. It eases tension and pain. It also improves blood flow and helps you relax. · Transcutaneous electrical nerve stimulation (TENS) uses a gentle electric current applied to the skin for pain relief. You can use TENS at home after you learn how. · Acupuncture is a form of traditional [facility] medicine. It uses very thin needles inserted into certain points of the body. It is done by a person with special training (acupuncturist). · If you get physical therapy, make sure to do any home exercises or stretching your therapist has prescribed. · Stay as active as you can. Try to get some physical activity every day. Behavioral treatments  · Biofeedback teaches you to control a body function that you normally don't think about. These are things like skin temperature, heart rate, and blood pressure. At first, you will use a machine to give you feedback on the function you want to control. Then a therapist will teach you what to do next. Over time, you can stop using the machine. · Breathing techniques can help you relax and get rid of tension. · Guided imagery is a series of thoughts and images that can focus your attention away from your pain.  When you do it, you use all your senses to help your body respond as though what you are imagining is real.  · Hypnosis is a state of focused concentration that makes you less aware of your surroundings. It may cause your brain to release chemicals that relieve pain. You can have a therapist help you through hypnosis. Or you can learn to use it on yourself. · Cognitive behavioral therapy is a type of counseling. It helps you change your thought patterns. Changes in your thoughts can help change your behavior and the way you perceive your body. Other treatments and ideas  · Aromatherapy uses the scent of oils obtained from plants to help you relax or to relieve stress. · Meditation focuses your attention to give a clear awareness of your life. You sit quietly, focus on one image or sound, and breathe deeply. · Yoga uses stretching and exercises (called postures) to reduce stress and improve flexibility and health. · Keep track of your pain in a pain diary. This can help you understand how the things you do affect your pain. · In rare cases, your doctor may advise you to get a nerve block to help with pain. A nerve block is a shot of medicine to interrupt pain signals to your brain. · Some hospitals have special pain clinics or centers. Your doctor may refer you to a pain clinic or center. Or you can ask your doctor about them. Where can you learn more? Go to http://nehemiah-nadia.info/. Enter K494 in the search box to learn more about \"Developing a Pain Management Plan: Care Instructions. \"  Current as of: October 14, 2016  Content Version: 11.2  © 4650-8517 [employer]. Care instructions adapted under license by [employer] (which disclaims liability or warranty for this information). If you have questions about a medical condition or this instruction, always ask your healthcare professional. [place] [address] any warranty or liability for your use of this information.

## 2017-04-18 NOTE — MR AVS SNAPSHOT
Visit Information Date & Time Provider Department Dept. Phone Encounter #  
 4/18/2017  9:15 AM Clemente Schultz MD Andrew Motley [phone] [id number] Follow-up Instructions Return in about 1 month (around 5/18/2017) for HTN, GERD, Pain  . Upcoming Health Maintenance Date Due COLONOSCOPY 11/30/1977 DTaP/Tdap/Td series (2 - Td) 4/18/2026 Allergies as of 4/18/2017  Review Complete On: 4/18/2017 By: Clemente Schultz MD  
  
 Severity Noted Reaction Type Reactions Advil [Ibuprofen]  05/30/2011    Hives Fluoxetine  04/18/2017    Diarrhea Lovastatin  01/12/2017    Diarrhea Tylenol [Acetaminophen]  05/30/2011    Hives Current Immunizations  Reviewed on 4/18/2017 Name Date Influenza Vaccine (Quad) PF 9/20/2016 Pneumococcal Polysaccharide (PPSV-23) 11/11/2016 Tdap 4/18/2016 Reviewed by Susana Abraham LPN on 5/29/5534 at  8:51 AM  
 Reviewed by Susana Abraham LPN on 3/72/0402 at  8:57 AM  
You Were Diagnosed With   
  
 Codes Comments Essential hypertension with goal blood pressure less than 140/90    -  Primary ICD-10-CM: I10 
ICD-9-CM: 401.9 Hyperlipemia, mixed     ICD-10-CM: E78.2 ICD-9-CM: 272.2 Depression with anxiety     ICD-10-CM: F41.8 ICD-9-CM: 300.4 Smoker     ICD-10-CM: L62.064 ICD-9-CM: 305.1 History of tibial fracture     ICD-10-CM: Z87.81 ICD-9-CM: V15.51 Hyperglycemia     ICD-10-CM: R73.9 ICD-9-CM: 790.29 Chronic pain of left lower extremity     ICD-10-CM: M79.605, G89.29 ICD-9-CM: 729.5, 338.29 Vitals BP Pulse Temp Resp Height(growth percentile) Weight(growth percentile) (!) 150/99 (BP 1 Location: Right arm, BP Patient Position: Sitting) 92 97.6 °F (36.4 °C) (Oral) 16 5' 8\" (1.727 m) 194 lb (88 kg) SpO2 BMI Smoking Status 98% 29.5 kg/m2 Current Every Day Smoker Vitals History BMI and BSA Data Body Mass Index Body Surface Area 29.5 kg/m 2 2.05 m 2 Preferred Pharmacy Pharmacy Name Phone [facility] [address] [place], [place] - [address] [place] [address] Krista Dias [phone] Your Updated Medication List  
  
   
This list is accurate as of: 4/18/17  9:59 AM.  Always use your most recent med list.  
  
  
  
  
 ALEVE 220 mg tablet Generic drug:  naproxen sodium Take 440 mg by mouth daily. calcium carbonate 200 mg calcium (500 mg) Chew Commonly known as:  TUMS Take 1 Tab by mouth daily. famotidine 20 mg tablet Commonly known as:  PEPCID Take 1 Tab by mouth two (2) times a day. lisinopril-hydroCHLOROthiazide 20-12.5 mg per tablet Commonly known as:  Trenna Pi Take 2 Tabs by mouth daily. * venlafaxine-SR 37.5 mg capsule Commonly known as:  EFFEXOR-XR Take one po daily for one week, then increase to 75 mg capsule. * venlafaxine-SR 75 mg capsule Commonly known as:  EFFEXOR-XR Take 1 Cap by mouth daily. Start after finishing 37.5 mg capsule * Notice: This list has 2 medication(s) that are the same as other medications prescribed for you. Read the directions carefully, and ask your doctor or other care provider to review them with you. Prescriptions Sent to Pharmacy Refills  
 lisinopril-hydroCHLOROthiazide (PRINZIDE, ZESTORETIC) 20-12.5 mg per tablet 1 Sig: Take 2 Tabs by mouth daily. Class: Normal  
 Pharmacy: [address], [address] [facility] Ph #: [phone] Route: Oral  
 venlafaxine-SR (EFFEXOR-XR) 37.5 mg capsule 0 Sig: Take one po daily for one week, then increase to 75 mg capsule. Class: Normal  
 Pharmacy: [facility] [address] [facility] Ph #: [phone]  
 venlafaxine-SR (EFFEXOR-XR) 75 mg capsule 5 Sig: Take 1 Cap by mouth daily. Start after finishing 37.5 mg capsule  Class: Normal  
 Pharmacy: [address], [place] [address] [facility] Ph #: [phone] Route: Oral  
  
We Performed the Following HEMOGLOBIN A1C WITH EAG [66297 CPT(R)] LIPID PANEL [71031 CPT(R)] METABOLIC PANEL, COMPREHENSIVE [36192 CPT(R)] REFERRAL TO PHYSICIAL MEDICINE REHAB [D[facility]86 Custom] Follow-up Instructions Return in about 1 month (around 5/18/2017) for HTN, GERD, Pain  . Referral Information Referral ID Referred By Referred To  
  
 [id number] MARIANNAYolanda GUIDRY 63   
   [address], [address] [place] [address] Visits Status Start Date End Date 1 New Request 4/18/17 4/18/18 If your referral has a status of pending review or denied, additional information will be sent to support the outcome of this decision. Patient Instructions Increase lisinopril/hydrochlorothiazide to 2 tablets daily Start venlafaxine XR 37.5 mg once a day for one week. Then take 75 mg daily Restart famotidine. Do not take Aleve and Advil together and do not take more than 2 Aleve twice a day. Take with food. Make appointments with gastroenterology and physical medicine and rehab. Return in one month Developing a Pain Management Plan: Care Instructions Your Care Instructions Some diseases and injuries can cause pain that lasts a long time. You don't need to live with uncontrolled pain. A pain management plan helps you find ways to control pain with side effects you can live with. There are things you can do to help with pain. Only you know how much pain you feel. Constant pain can make you depressed. It can cause stress and make it hard for you to eat and sleep. But there are ways to control the pain. They can help you stay active, improve your mood, and heal faster. Your plan can include many types of pain control. You may take prescription or over-the-counter drugs. You can also try physical treatments and behavioral methods.  Some medical treatments also help with pain. For example, radiation can be used to reduce pain from bone cancer. You and your doctor will work to make your plan. Be sure to read it often. Change it if your pain is not under control. Follow-up care is a key part of your treatment and safety. Be sure to make and go to all appointments, and call your doctor if you are having problems. It's also a good idea to know your test results and keep a list of the medicines you take. How can you care for yourself at home? Physical treatments · Be safe with medicines. Take pain medicines exactly as directed. ¨ If the doctor gave you a prescription medicine for pain, take it exactly as prescribed. ¨ If you are not taking a prescription pain medicine, ask your doctor if you can take an over-the-counter medicine. · If your pain medicine causes side effects such as constipation or nausea, you may need to take other medicines for those problems. Talk to your doctor about any side effects you have. · Hot or cold compresses applied directly to the skin can help sore muscles. Put ice or a cold pack on the painful area for 10 to 20 minutes at a time. Put a thin cloth between the ice and your skin. You may find that it helps to switch between cold and heat. Try a heating pad set on low or a warm cloth on the area for 10 to 15 minutes at a time. · Hydrotherapy uses flowing water to relax muscles. You may want to sit in a hot tub or a steam bath. Or use a shower or a sitz bath to help your pain. · Massage therapy is rubbing the soft tissues of the body. It eases tension and pain. It also improves blood flow and helps you relax. · Transcutaneous electrical nerve stimulation (TENS) uses a gentle electric current applied to the skin for pain relief. You can use TENS at home after you learn how. · Acupuncture is a form of traditional [facility] medicine. It uses very thin needles inserted into certain points of the body.  It is done by a person with special training (acupuncturist). · If you get physical therapy, make sure to do any home exercises or stretching your therapist has prescribed. · Stay as active as you can. Try to get some physical activity every day. Behavioral treatments · Biofeedback teaches you to control a body function that you normally don't think about. These are things like skin temperature, heart rate, and blood pressure. At first, you will use a machine to give you feedback on the function you want to control. Then a therapist will teach you what to do next. Over time, you can stop using the machine. · Breathing techniques can help you relax and get rid of tension. · Guided imagery is a series of thoughts and images that can focus your attention away from your pain. When you do it, you use all your senses to help your body respond as though what you are imagining is real. 
· Hypnosis is a state of focused concentration that makes you less aware of your surroundings. It may cause your brain to release chemicals that relieve pain. You can have a therapist help you through hypnosis. Or you can learn to use it on yourself. · Cognitive behavioral therapy is a type of counseling. It helps you change your thought patterns. Changes in your thoughts can help change your behavior and the way you perceive your body. Other treatments and ideas · Aromatherapy uses the scent of oils obtained from plants to help you relax or to relieve stress. · Meditation focuses your attention to give a clear awareness of your life. You sit quietly, focus on one image or sound, and breathe deeply. · Yoga uses stretching and exercises (called postures) to reduce stress and improve flexibility and health. · Keep track of your pain in a pain diary. This can help you understand how the things you do affect your pain.  
· In rare cases, your doctor may advise you to get a nerve block to help with pain. A nerve block is a shot of medicine to interrupt pain signals to your brain. · Some hospitals have special pain clinics or centers. Your doctor may refer you to a pain clinic or center. Or you can ask your doctor about them. Where can you learn more? Go to http://nehemiah-nadia.info/. Enter Y904 in the search box to learn more about \"Developing a Pain Management Plan: Care Instructions. \" Current as of: October 14, 2016 Content Version: 11.2 © 7722-3734 [employer]. Care instructions adapted under license by [employer] (which disclaims liability or warranty for this information). If you have questions about a medical condition or this instruction, always ask your healthcare professional. [address] any warranty or liability for your use of this information. Introducing [facility]! Bert Ledezma introduces [employer] patient portal. Now you can access parts of your medical record, email your doctor's office, and request medication refills online. 1. In your internet browser, go to [email or web address] 2. Click on the First Time User? Click Here link in the Sign In box. You will see the New Member Sign Up page. 3. Enter your [employer] Access Code exactly as it appears below. You will not need to use this code after youve completed the sign-up process. If you do not sign up before the expiration date, you must request a new code. · [employer] Access Code: [email or web address] Expires: 7/17/2017  9:57 AM 
 
4. Enter the last four digits of your Social Security Number (xxxx) and Date of Birth (mm/dd/yyyy) as indicated and click Submit. You will be taken to the next sign-up page. 5. Create a [employer] ID. This will be your [employer] login ID and cannot be changed, so think of one that is secure and easy to remember. 6. Create a [employer] password. You can change your password at any time. 7. Enter your Password Reset Question and Answer. This can be used at a later time if you forget your password. 8. Enter your e-mail address. You will receive e-mail notification when new information is available in [address] 9. Click Sign Up. You can now view and download portions of your medical record. 10. Click the Download Summary menu link to download a portable copy of your medical information. If you have questions, please visit the Frequently Asked Questions section of the [employer] website. Remember, [employer] is NOT to be used for urgent needs. For medical emergencies, dial 911. Now available from your iPhone and Android! Please provide this summary of care documentation to your next provider. Your primary care clinician is listed as Joey Polo. If you have any questions after today's visit, please call [phone].

## 2017-04-18 NOTE — PROGRESS NOTES
HISTORY OF PRESENT ILLNESS  Lawanda Chandler is a 61 y.o. male. He now has Medicaid for his medical expenses. HPI   He presents for follow up of hypertension and hyperlipidemia. Diet and Lifestyle: generally follows a low fat low cholesterol diet, generally follows a low sodium diet, sedentary, smoker 1 ppd   Medication compliance: noncompliant much of the time  Medication side effects: none  Home BP Monitoring: is not measured at home. Cardiovascular ROS:  He complains of dizziness. He denies palpitations, orthopnea, exertional chest pressure/discomfort, claudication, lower extremity edema, dyspnea on exertion    He is seen for followup of depression and anxiety. He denies depressed mood, anhedonia, feelings of worthlessness/guilt, difficulty concentrating, hopelessness and recurrent thoughts of death, racing thoughts, feelings of losing control  He  experiences the following side effects from the treatment: diarrhea from Prozac    He presents for follow up of gastroesophageal reflux. He is currently treating this with TUMS. Not taking famotidine as prescribed. Current symptoms include belching and eructation, midepigastric pain and symptoms primarily relate to meals, and lying down after meals. He denies dysphagia, has not lost weight, denies heartburn.       Patient Active Problem List   Diagnosis Code    Adenoma of right adrenal gland D35.01    Pulmonary nodules/lesions, multiple R91.8    Smoker F17.200    GERD (gastroesophageal reflux disease) K21.9    Osteoarthritis of left knee M17.12    COPD (chronic obstructive pulmonary disease) ([facility] [address] J44.9    Depression with anxiety F41.8    Hyperlipemia, mixed E78.2    Essential hypertension with goal blood pressure less than 140/90 I10    Noncompliance with medication regimen Z91.14     Past Medical History:   Diagnosis Date    Adrenal mass, right ([facility] [address] 2014    Seen on abd CT    CAD (coronary artery disease)     calcific atherosclerosis on CT    Depression     Hypercholesterolemia     Hypertension     Multiple lung nodules      Past Surgical History:   Procedure Laterality Date    HX ORTHOPAEDIC      hand    HX ORTHOPAEDIC      foot     Social History     Social History    Marital status: SINGLE     Spouse name: N/A    Number of children: N/A    Years of education: N/A     Social History Main Topics    Smoking status: Current Every Day Smoker     Packs/day: 1.00     Types: Cigarettes    Smokeless tobacco: Never Used    Alcohol use No      Comment: stopped jan 2014    Drug use: No    Sexual activity: Not Asked     Other Topics Concern    None     Social History Narrative     Family History   Problem Relation Age of Onset    Heart Disease Mother     Asthma Mother     Elevated Lipids Mother     Hypertension Mother      Allergies   Allergen Reactions    Advil [Ibuprofen] Hives    Lovastatin Diarrhea    Tylenol [Acetaminophen] Hives     Current Outpatient Prescriptions   Medication Sig Dispense Refill    lisinopril-hydroCHLOROthiazide (PRINZIDE, ZESTORETIC) 20-12.5 mg per tablet Take 1 Tab by mouth daily. 90 Tab 0    FLUoxetine (PROZAC) 10 mg capsule Take 1 Cap by mouth daily. 30 Cap 5    famotidine (PEPCID) 20 mg tablet Take 1 Tab by mouth two (2) times a day. 60 Tab 5    naproxen sodium (ALEVE) 220 mg tablet Take 440 mg by mouth daily.  calcium carbonate (TUMS) 200 mg calcium (500 mg) chew Take 1 Tab by mouth daily. Review of Systems   Constitutional: Positive for malaise/fatigue. Negative for weight loss. Gastrointestinal: Positive for blood in stool. Negative for constipation, diarrhea and heartburn. Musculoskeletal: Positive for joint pain. Negative for back pain. Leg cramps at night   Neurological: Negative for dizziness, tingling and focal weakness.          Visit Vitals    BP (!) 150/99 (BP 1 Location: Right arm, BP Patient Position: Sitting)  Comment: patient has not taken meds today    Pulse 92    Temp 97.6 °F (36.4 °C) (Oral)    Resp 16    Ht 5' 8\" (1.727 m)    Wt 194 lb (88 kg)    SpO2 98%    BMI 29.5 kg/m2     Physical Exam   Constitutional: He is oriented to person, place, and time. He appears well-developed and well-nourished. HENT:   Head: Normocephalic and atraumatic. Eyes: Conjunctivae are normal. Pupils are equal, round, and reactive to light. Neck: Neck supple. Carotid bruit is not present. No thyromegaly present. Cardiovascular: Normal rate, regular rhythm and normal heart sounds. PMI is not displaced. Exam reveals no gallop. No murmur heard. Pulses:       Dorsalis pedis pulses are 2+ on the right side, and 2+ on the left side. Posterior tibial pulses are 2+ on the right side, and 2+ on the left side. Pulmonary/Chest: Effort normal. He has no wheezes. He has no rhonchi. He has no rales. Abdominal: Soft. Normal appearance. He exhibits no abdominal bruit and no mass. There is no hepatosplenomegaly. There is no tenderness. Musculoskeletal: He exhibits no edema. Lymphadenopathy:     He has no cervical adenopathy. Right: No supraclavicular adenopathy present. Left: No inguinal and no supraclavicular adenopathy present. Neurological: He is alert and oriented to person, place, and time. No sensory deficit. Skin: Skin is warm, dry and intact. No rash noted. Psychiatric: He has a normal mood and affect. His behavior is normal.   Nursing note and vitals reviewed. ASSESSMENT and PLAN    ICD-10-CM ICD-9-CM    1. Essential hypertension with goal blood pressure less than 140/90 I10 401.9 lisinopril-hydroCHLOROthiazide (PRINZIDE, ZESTORETIC) 20-12.5 mg per tablet   2. Hyperlipemia, mixed E78.2 272.2 LIPID PANEL      METABOLIC PANEL, COMPREHENSIVE      TSH REFLEX TO T4   3. Depression with anxiety F41.8 300.4 venlafaxine-SR (EFFEXOR-XR) 37.5 mg capsule      venlafaxine-SR (EFFEXOR-XR) 75 mg capsule   4. Smoker F17.200 305.1    5.  History of tibial fracture Z87.81 V15.51 REFERRAL TO PHYSICIAL MEDICINE REHAB   6. Hyperglycemia R73.9 790.29 HEMOGLOBIN A1C WITH EAG   7. Chronic pain of left lower extremity M79.605 729.5     G89.29 338.29          Essential hypertension with goal blood pressure less than 140/90  Hypertension is uncontrolled - medication changes/orders  -    Increase lisinopril-hydroCHLOROthiazide (PRINZIDE, ZESTORETIC) 20-12.5 mg per tablet; Take 2 Tabs by mouth daily. Hyperlipemia, mixed  Hyperlipidemia is uncontrolled Confirm with new lab before initiating medication  -     LIPID PANEL  -     METABOLIC PANEL, COMPREHENSIVE  -     TSH REFLEX TO T4    Depression with anxiety  SNRI might also help with chronic pain  -     venlafaxine-SR (EFFEXOR-XR) 37.5 mg capsule; Take one po daily for one week, then increase to 75 mg capsule.  -     venlafaxine-SR (EFFEXOR-XR) 75 mg capsule; Take 1 Cap by mouth daily. Start after finishing 37.5 mg capsule    Smoker  Encouraged to stop smoking to decrease risk of cancer, pulmonary, and cardiovascular disease. History of tibial fracture  Source of ongoing pain. Venlafaxine might be of benefit. Tramadol does not help; want to avoid stronger narcotics. He is still on crutches, but has been discharged by orthopedist. PM&R seems a good next step. -     REFERRAL TO PHYSICIAL MEDICINE REHAB    Hyperglycemia  New diagnosis. Look for glucose intolerance. -     HEMOGLOBIN A1C WITH EAG    Chronic pain of left lower extremity  Since tibial fracture leading to 2 surgeries. Follow-up Disposition:  Return in about 1 month (around 5/18/2017) for HTN, GERD, Pain  .  reviewed diet, exercise and weight control  Discussed the patient's BMI with him. The BMI follow up plan is as follows: I have counseled this patient on diet and exercise regimens  I have discussed the diagnosis with the patient and the intended plan as seen in the above orders. Patient is in agreement.   The patient has received an after-visit summary and questions were answered concerning future plans. I have discussed medication side effects and warnings with the patient as well.

## 2017-04-19 PROBLEM — R73.02 GLUCOSE INTOLERANCE (IMPAIRED GLUCOSE TOLERANCE): Status: ACTIVE | Noted: 2017-04-19

## 2017-04-19 LAB
ALBUMIN SERPL-MCNC: 4.8 G/DL (ref 3.5–5.5)
ALBUMIN/GLOB SERPL: 2.3 {RATIO} (ref 1.2–2.2)
ALP SERPL-CCNC: 123 IU/L (ref 39–117)
ALT SERPL-CCNC: 28 IU/L (ref 0–44)
AST SERPL-CCNC: 18 IU/L (ref 0–40)
BILIRUB SERPL-MCNC: 0.5 MG/DL (ref 0–1.2)
BUN SERPL-MCNC: 17 MG/DL (ref 6–24)
BUN/CREAT SERPL: 25 (ref 9–20)
CALCIUM SERPL-MCNC: 9.8 MG/DL (ref 8.7–10.2)
CHLORIDE SERPL-SCNC: 98 MMOL/L (ref 96–106)
CHOLEST SERPL-MCNC: 302 MG/DL (ref 100–199)
CO2 SERPL-SCNC: 21 MMOL/L (ref 18–29)
CREAT SERPL-MCNC: 0.67 MG/DL (ref 0.76–1.27)
EST. AVERAGE GLUCOSE BLD GHB EST-MCNC: 117 MG/DL
GLOBULIN SER CALC-MCNC: 2.1 G/DL (ref 1.5–4.5)
GLUCOSE SERPL-MCNC: 103 MG/DL (ref 65–99)
HBA1C MFR BLD: 5.7 % (ref 4.8–5.6)
HDLC SERPL-MCNC: 46 MG/DL
INTERPRETATION, 910389: NORMAL
LDLC SERPL CALC-MCNC: 227 MG/DL (ref 0–99)
POTASSIUM SERPL-SCNC: 4.5 MMOL/L (ref 3.5–5.2)
PROT SERPL-MCNC: 6.9 G/DL (ref 6–8.5)
SODIUM SERPL-SCNC: 137 MMOL/L (ref 134–144)
TRIGL SERPL-MCNC: 144 MG/DL (ref 0–149)
VLDLC SERPL CALC-MCNC: 29 MG/DL (ref 5–40)

## 2017-04-19 NOTE — PROGRESS NOTES
Inform patient cholesterol is significantly higher than when last checked. I would like to check thyroid blood test as underactive thyroid can cause high cholesterol. There should be enough blood in the lab for this test. Need to notify phlebotomist of this.

## 2017-08-31 ENCOUNTER — OFFICE VISIT (OUTPATIENT)
Dept: INTERNAL MEDICINE CLINIC | Age: 57
End: 2017-08-31

## 2017-08-31 VITALS
OXYGEN SATURATION: 95 % | TEMPERATURE: 96.7 F | DIASTOLIC BLOOD PRESSURE: 82 MMHG | HEART RATE: 69 BPM | HEIGHT: 68 IN | WEIGHT: 185.5 LBS | BODY MASS INDEX: 28.11 KG/M2 | SYSTOLIC BLOOD PRESSURE: 130 MMHG | RESPIRATION RATE: 16 BRPM

## 2017-08-31 DIAGNOSIS — M17.32 POST-TRAUMATIC OSTEOARTHRITIS OF ONE KNEE, LEFT: ICD-10-CM

## 2017-08-31 DIAGNOSIS — H54.62 UNQUALIFIED VISUAL LOSS OF LEFT EYE WITH NORMAL VISION OF CONTRALATERAL EYE: ICD-10-CM

## 2017-08-31 DIAGNOSIS — Z12.11 COLON CANCER SCREENING: ICD-10-CM

## 2017-08-31 DIAGNOSIS — R10.13 EPIGASTRIC PAIN: ICD-10-CM

## 2017-08-31 DIAGNOSIS — K21.9 GASTROESOPHAGEAL REFLUX DISEASE, ESOPHAGITIS PRESENCE NOT SPECIFIED: ICD-10-CM

## 2017-08-31 DIAGNOSIS — F17.210 MODERATE CIGARETTE SMOKER (10-19 PER DAY): ICD-10-CM

## 2017-08-31 DIAGNOSIS — E78.2 HYPERLIPEMIA, MIXED: ICD-10-CM

## 2017-08-31 DIAGNOSIS — Z23 ENCOUNTER FOR IMMUNIZATION: ICD-10-CM

## 2017-08-31 DIAGNOSIS — R73.02 GLUCOSE INTOLERANCE (IMPAIRED GLUCOSE TOLERANCE): ICD-10-CM

## 2017-08-31 DIAGNOSIS — S82.252S: ICD-10-CM

## 2017-08-31 DIAGNOSIS — I10 HYPERTENSION, ESSENTIAL: Primary | ICD-10-CM

## 2017-08-31 DIAGNOSIS — R63.4 WEIGHT LOSS, UNINTENTIONAL: ICD-10-CM

## 2017-08-31 RX ORDER — OMEPRAZOLE 20 MG/1
CAPSULE, DELAYED RELEASE ORAL
Qty: 30 CAP | Refills: 0 | Status: SHIPPED | OUTPATIENT
Start: 2017-08-31 | End: 2018-11-01 | Stop reason: DRUGHIGH

## 2017-08-31 NOTE — PROGRESS NOTES
Reviewed record In preparation for visit and have obtained necessary documentation. Has info on advanced directive but has not filled them out. 1. Have you been to the ER, urgent care clinic or hospitalized since your last visit? No     2. Have you seen or consulted any other health care providers outside of the [address] [place] [address] since your last visit? Include any pap smears or colon screening. No    Vitals reviewed with provider.     Health Maintenance reviewed:

## 2017-08-31 NOTE — PATIENT INSTRUCTIONS
Try melatonin 3 mg about 1-2 hours before you go to bed to help with sleep. Start omeprazole 20 mg once a day for stomach problems. Take on an empty stomach, at least 30 minutes before eating, with water. See gastroenterologist, eye doctor and orthopedic doctor. Office visit in 3 months. Insomnia: Care Instructions  Your Care Instructions  Insomnia is the inability to sleep well. It is a common problem for most people at some time. Insomnia may make it hard for you to get to sleep, stay asleep, or sleep as long as you need to. This can make you tired and grouchy during the day. It can also make you forgetful, less effective at work, and unhappy. Insomnia can be caused by conditions such as depression or anxiety. Pain can also affect your ability to sleep. When these problems are solved, the insomnia usually clears up. But sometimes bad sleep habits can cause insomnia. If insomnia is affecting your work or your enjoyment of life, you can take steps to improve your sleep. Follow-up care is a key part of your treatment and safety. Be sure to make and go to all appointments, and call your doctor if you are having problems. It's also a good idea to know your test results and keep a list of the medicines you take. How can you care for yourself at home? What to avoid  · Do not have drinks with caffeine, such as coffee or black tea, for 8 hours before bed. · Do not smoke or use other types of tobacco near bedtime. Nicotine is a stimulant and can keep you awake. · Avoid drinking alcohol late in the evening, because it can cause you to wake in the middle of the night. · Do not eat a big meal close to bedtime. If you are hungry, eat a light snack. · Do not drink a lot of water close to bedtime, because the need to urinate may wake you up during the night. · Do not read or watch TV in bed. Use the bed only for sleeping and sexual activity.   What to try  · Go to bed at the same time every night, and wake up at the same time every morning. Do not take naps during the day. · Keep your bedroom quiet, dark, and cool. · Sleep on a comfortable pillow and mattress. · If watching the clock makes you anxious, turn it facing away from you so you cannot see the time. · If you worry when you lie down, start a worry book. Well before bedtime, write down your worries, and then set the book and your concerns aside. · Try meditation or other relaxation techniques before you go to bed. · If you cannot fall asleep, get up and go to another room until you feel sleepy. Do something relaxing. Repeat your bedtime routine before you go to bed again. · Make your house quiet and calm about an hour before bedtime. Turn down the lights, turn off the TV, log off the computer, and turn down the volume on music. This can help you relax after a busy day. When should you call for help? Watch closely for changes in your health, and be sure to contact your doctor if:  · Your efforts to improve your sleep do not work. · Your insomnia gets worse. · You have been feeling down, depressed, or hopeless or have lost interest in things that you usually enjoy. Where can you learn more? Go to http://nehemiah-nadia.info/. Enter P513 in the search box to learn more about \"Insomnia: Care Instructions. \"  Current as of: July 26, 2016  Content Version: 11.3  © 2572-7637 [employer]. Care instructions adapted under license by [employer] (which disclaims liability or warranty for this information). If you have questions about a medical condition or this instruction, always ask your healthcare professional. [address] any warranty or liability for your use of this information. Learning About Sleeping Well  What does sleeping well mean? Sleeping well means getting enough sleep. How much sleep is enough varies among people.   The number of hours you sleep is not as important as how you feel when you wake up. If you do not feel refreshed, you probably need more sleep. Another sign of not getting enough sleep is feeling tired during the day. The average total nightly sleep time is 7½ to 8 hours. Healthy adults may need a little more or a little less than this. Why is getting enough sleep important? Getting enough quality sleep is a basic part of good health. When your sleep suffers, your mood and your thoughts can suffer too. You may find yourself feeling more grumpy or stressed. Not getting enough sleep also can lead to serious problems, including injury, accidents, anxiety, and depression. What might cause poor sleeping? Many things can cause sleep problems, including:  · Stress. Stress can be caused by fear about a single event, such as giving a speech. Or you may have ongoing stress, such as worry about work or school. · Depression, anxiety, and other mental or emotional conditions. · Changes in your sleep habits or surroundings. This includes changes that happen where you sleep, such as noise, light, or sleeping in a different bed. It also includes changes in your sleep pattern, such as having jet lag or working a late shift. · Health problems, such as pain, breathing problems, and restless legs syndrome. · Lack of regular exercise. How can you help yourself? Here are some tips that may help you sleep more soundly and wake up feeling more refreshed. Your sleeping area  · Use your bedroom only for sleeping and sex. A bit of light reading may help you fall asleep. But if it doesn't, do your reading elsewhere in the house. Don't watch TV in bed. · Be sure your bed is big enough to stretch out comfortably, especially if you have a sleep partner. · Keep your bedroom quiet, dark, and cool. Use curtains, blinds, or a sleep mask to block out light. To block out noise, use earplugs, soothing music, or a \"white noise\" machine.   Your evening and bedtime routine  · Create a relaxing bedtime routine. You might want to take a warm shower or bath, listen to soothing music, or drink a cup of noncaffeinated tea. · Go to bed at the same time every night. And get up at the same time every morning, even if you feel tired. What to avoid  · Limit caffeine (coffee, tea, caffeinated sodas) during the day, and don't have any for at least 4 to 6 hours before bedtime. · Don't drink alcohol before bedtime. Alcohol can cause you to wake up more often during the night. · Don't smoke or use tobacco, especially in the evening. Nicotine can keep you awake. · Don't take naps during the day, especially close to bedtime. · Don't lie in bed awake for too long. If you can't fall asleep, or if you wake up in the middle of the night and can't get back to sleep within 15 minutes or so, get out of bed and go to another room until you feel sleepy. · Don't take medicine right before bed that may keep you awake or make you feel hyper or energized. Your doctor can tell you if your medicine may do this and if you can take it earlier in the day. If you can't sleep  · Imagine yourself in a peaceful, pleasant scene. Focus on the details and feelings of being in a place that is relaxing. · Get up and do a quiet or boring activity until you feel sleepy. · Don't drink any liquids after 6 p.m. if you wake up often because you have to go to the bathroom. Where can you learn more? Go to http://nehemiah-nadia.info/. Enter H285 in the search box to learn more about \"Learning About Sleeping Well. \"  Current as of: July 26, 2016  Content Version: 11.3  © 9656-6956 [employer]. Care instructions adapted under license by [employer] (which disclaims liability or warranty for this information).  If you have questions about a medical condition or this instruction, always ask your healthcare professional. [address] any warranty or liability for your use of this information. Vaccine Information Statement    Influenza (Flu) Vaccine (Inactivated or Recombinant): What you need to know    Many Vaccine Information Statements are available in [place] and other languages. See www.immunize.org/vis  Hojas de Información Sobre Vacunas están disponibles en Español y en muchos otros idiomas. Visite www.immunize.org/vis    1. Why get vaccinated? Influenza (flu) is a contagious disease that spreads around the [facility] every year, usually between October and May. Flu is caused by influenza viruses, and is spread mainly by coughing, sneezing, and close contact. Anyone can get flu. Flu strikes suddenly and can last several days. Symptoms vary by age, but can include:   fever/chills   sore throat   muscle aches   fatigue   cough   headache    runny or stuffy nose    Flu can also lead to pneumonia and blood infections, and cause diarrhea and seizures in children. If you have a medical condition, such as heart or lung disease, flu can make it worse. Flu is more dangerous for some people. Infants and young children, people 72years of age and older, pregnant women, and people with certain health conditions or a weakened immune system are at greatest risk. Each year thousands of people in the [facility] die from flu, and many more are hospitalized. Flu vaccine can:   keep you from getting flu,   make flu less severe if you do get it, and   keep you from spreading flu to your family and other people. 2. Inactivated and recombinant flu vaccines    A dose of flu vaccine is recommended every flu season. Children 6 months through 6years of age may need two doses during the same flu season. Everyone else needs only one dose each flu season.        Some inactivated flu vaccines contain a very small amount of a mercury-based preservative called thimerosal. Studies have not shown thimerosal in vaccines to be harmful, but flu vaccines that do not contain thimerosal are available. There is no live flu virus in flu shots. They cannot cause the flu. There are many flu viruses, and they are always changing. Each year a new flu vaccine is made to protect against three or four viruses that are likely to cause disease in the upcoming flu season. But even when the vaccine doesnt exactly match these viruses, it may still provide some protection    Flu vaccine cannot prevent:   flu that is caused by a virus not covered by the vaccine, or   illnesses that look like flu but are not. It takes about 2 weeks for protection to develop after vaccination, and protection lasts through the flu season. 3. Some people should not get this vaccine    Tell the person who is giving you the vaccine:     If you have any severe, life-threatening allergies. If you ever had a life-threatening allergic reaction after a dose of flu vaccine, or have a severe allergy to any part of this vaccine, you may be advised not to get vaccinated. Most, but not all, types of flu vaccine contain a small amount of egg protein.  If you ever had Guillain-Barré Syndrome (also called GBS). Some people with a history of GBS should not get this vaccine. This should be discussed with your doctor.  If you are not feeling well. It is usually okay to get flu vaccine when you have a mild illness, but you might be asked to come back when you feel better. 4. Risks of a vaccine reaction    With any medicine, including vaccines, there is a chance of reactions. These are usually mild and go away on their own, but serious reactions are also possible. Most people who get a flu shot do not have any problems with it.      Minor problems following a flu shot include:    soreness, redness, or swelling where the shot was given     hoarseness   sore, red or itchy eyes   cough   fever   aches   headache   itching   fatigue  If these problems occur, they usually begin soon after the shot and last 1 or 2 days. More serious problems following a flu shot can include the following:     There may be a small increased risk of Guillain-Barré Syndrome (GBS) after inactivated flu vaccine. This risk has been estimated at 1 or 2 additional cases per million people vaccinated. This is much lower than the risk of severe complications from flu, which can be prevented by flu vaccine.  Young children who get the flu shot along with pneumococcal vaccine (PCV13) and/or DTaP vaccine at the same time might be slightly more likely to have a seizure caused by fever. Ask your doctor for more information. Tell your doctor if a child who is getting flu vaccine has ever had a seizure. Problems that could happen after any injected vaccine:      People sometimes faint after a medical procedure, including vaccination. Sitting or lying down for about 15 minutes can help prevent fainting, and injuries caused by a fall. Tell your doctor if you feel dizzy, or have vision changes or ringing in the ears.  Some people get severe pain in the shoulder and have difficulty moving the arm where a shot was given. This happens very rarely.  Any medication can cause a severe allergic reaction. Such reactions from a vaccine are very rare, estimated at about 1 in a million doses, and would happen within a few minutes to a few hours after the vaccination. As with any medicine, there is a very remote chance of a vaccine causing a serious injury or death. The safety of vaccines is always being monitored. For more information, visit: www.cdc.gov/vaccinesafety/    5. What if there is a serious reaction? What should I look for?  Look for anything that concerns you, such as signs of a severe allergic reaction, very high fever, or unusual behavior.     Signs of a severe allergic reaction can include hives, swelling of the face and throat, difficulty breathing, a fast heartbeat, dizziness, and weakness - usually within a few minutes to a few hours after the vaccination. What should I do?  If you think it is a severe allergic reaction or other emergency that cant wait, call 9-1-1 and get the person to the nearest hospital. Otherwise, call your doctor.  Reactions should be reported to the Vaccine Adverse Event Reporting System (VAERS). Your doctor should file this report, or you can do it yourself through  the VAERS web site at www.vaers. [facility].gov, or by calling 4-573.629.8840. VAERS does not give medical advice. 6. The National Vaccine Injury Compensation Program    The [facility] Vaccine Injury Compensation Program (VICP) is a federal program that was created to compensate people who may have been injured by certain vaccines. Persons who believe they may have been injured by a vaccine can learn about the program and about filing a claim by calling 4-291.254.3425 or visiting the [employer] website at www.[facility].gov/vaccinecompensation. There is a time limit to file a claim for compensation. 7. How can I learn more?  Ask your healthcare provider. He or she can give you the vaccine package insert or suggest other sources of information.  Call your local or state health department.  Contact the Centers for Disease Control and Prevention (CDC):  - Call 7-721.680.3376 (1-800-CDC-INFO) or  - Visit CDCs website at www.cdc.gov/flu    Vaccine Information Statement   Inactivated Influenza Vaccine   8/7/2015  42 ULuis A Elizabeth Estimable [id number]    Department of Health and Human Services  Centers for Disease Control and Prevention    Office Use Only

## 2017-08-31 NOTE — PROGRESS NOTES
After obtaining consent, and per orders of Dr. Walter Pruitt, injection of regular influenza vaccine 0.5 ml given right deltoid IM.

## 2017-08-31 NOTE — PROGRESS NOTES
HISTORY OF PRESENT ILLNESS  Jess Mueller is a 61 y.o. male. HPI  He presents for follow up of hypertension, hyperlipidemia and glucose intolerance. He has lost 18 lbs since November. Eating less because stomach hurts less if he eats less. Diet and Lifestyle: generally follows a low fat low cholesterol diet, generally follows a low sodium diet, sedentary, smoker 0.5 ppd  Medication compliance: compliant all of the time  Medication side effects: none  Home BP Monitoring:  is not measured at home  Cardiovascular ROS:  He complains of dizziness. He denies palpitations, orthopnea, exertional chest pressure/discomfort, claudication, lower extremity edema, dyspnea on exertion     He presents for follow up of gastroesophageal reflux and epigastric pain. He is currently treating this with TINO and Aida Zimmerman. He was given famotidine, but it gave little relief. Denies alcohol use, but is taking Aleve for joint pain. He took some of his mother's Nexium which helped better. Current symptoms include regurgitation of undigested food. He denies dysphagia. Stopped taking venlafaxine due to diarrhea, which stopped when he discontinued the medication. He presents for follow up of chronic left knee and lower leg of 16  months duration. He had 2 surgical procedures after falling 15 feet off of a ladder. He sustained a grade 3 open tibial fracture and a calcaneal fracture. Pain quality is described as aching, sharp and throbbing and severity at worst is  8/10 and at least is 0/10 when alseep. Pain is present continuously in the daytime. Pain is aggravated by standing, walking. Pain affects work, standing and recreational activities. Pain is alleviated by Aleve and hot bath  Complains of visual loss in left eye, with normal vision on the right, for about 2 years. Wants referral to eye doctor.    Patient Active Problem List   Diagnosis Code    Adenoma of right adrenal gland D35.01    Pulmonary nodules/lesions, multiple R91.8    Smoker F17.200    GERD (gastroesophageal reflux disease) K21.9    Osteoarthritis of left knee M17.12    COPD [facility]chronic obstructive pulmonary disease) ([facility]) J44.9    Depression with anxiety F41.8    Hyperlipemia, mixed E78.2    Hypertension, essential I10    Noncompliance with medication regimen Z91.14    Glucose intolerance (impaired glucose tolerance) R73.02     Past Medical History:   Diagnosis Date    Adrenal mass, right ([address] 2014    Seen on abd CT    CAD (coronary artery disease)     calcific atherosclerosis on CT    Depression     Hypercholesterolemia     Hypertension     Multiple lung nodules      Past Surgical History:   Procedure Laterality Date    HX ORTHOPAEDIC      hand    HX ORTHOPAEDIC      foot     Social History     Social History    Marital status: SINGLE     Spouse name: N/A    Number of children: N/A    Years of education: N/A     Social History Main Topics    Smoking status: Current Every Day Smoker     Packs/day: 0.50     Types: Cigarettes    Smokeless tobacco: Never Used    Alcohol use No      Comment: stopped jan 2014    Drug use: No    Sexual activity: Not Asked     Other Topics Concern    None     Social History Narrative     Family History   Problem Relation Age of Onset    Heart Disease Mother     Asthma Mother     Elevated Lipids Mother     Hypertension Mother      Allergies   Allergen Reactions    Advil [Ibuprofen] Hives    Fluoxetine Diarrhea    Lovastatin Diarrhea    Tylenol [Acetaminophen] Hives     Current Outpatient Prescriptions   Medication Sig Dispense Refill    lisinopril-hydroCHLOROthiazide (PRINZIDE, ZESTORETIC) 20-12.5 mg per tablet Take 2 Tabs by mouth daily. 180 Tab 1    naproxen sodium (ALEVE) 220 mg tablet Take 440 mg by mouth daily.  calcium carbonate (TUMS) 200 mg calcium (500 mg) chew Take 1 Tab by mouth daily. Review of Systems   Constitutional: Negative for malaise/fatigue and weight loss. Gastrointestinal: Negative for constipation and diarrhea. Musculoskeletal: Positive for joint pain. Negative for back pain and neck pain. Neurological: Negative for dizziness, tingling and focal weakness. Visit Vitals    /82 (BP 1 Location: Left arm, BP Patient Position: Sitting)    Pulse 69    Temp 96.7 °F (35.9 °C) (Oral)    Resp 16    Ht 5' 8\" (1.727 m)    Wt 185 lb 8 oz (84.1 kg)    SpO2 95%    BMI 28.21 kg/m2     Physical Exam   Constitutional: He is oriented to person, place, and time. He appears well-developed and well-nourished. HENT:   Head: Normocephalic and atraumatic. Eyes: Conjunctivae are normal. Pupils are equal, round, and reactive to light. Neck: Neck supple. Carotid bruit is not present. No thyromegaly present. Cardiovascular: Normal rate, regular rhythm and normal heart sounds. PMI is not displaced. Exam reveals no gallop. No murmur heard. Pulses:       Dorsalis pedis pulses are 2+ on the right side, and 2+ on the left side. Posterior tibial pulses are 2+ on the right side, and 2+ on the left side. Pulmonary/Chest: Effort normal. He has no wheezes. He has no rhonchi. He has no rales. Abdominal: Soft. Normal appearance. He exhibits no abdominal bruit and no mass. There is no hepatosplenomegaly. There is no tenderness. Musculoskeletal: He exhibits no edema. Lymphadenopathy:     He has no cervical adenopathy. Right: No supraclavicular adenopathy present. Left: No supraclavicular adenopathy present. Neurological: He is alert and oriented to person, place, and time. No sensory deficit. Skin: Skin is warm, dry and intact. No rash noted. Psychiatric: He has a normal mood and affect. His behavior is normal.   Nursing note and vitals reviewed.     Lab Results   Component Value Date/Time    Cholesterol, total 302 04/18/2017 10:04 AM    HDL Cholesterol 46 04/18/2017 10:04 AM    LDL, calculated 227 04/18/2017 10:04 AM    VLDL, calculated 29 04/18/2017 10:04 AM    Triglyceride 144 04/18/2017 10:04 AM     Lab Results   Component Value Date/Time    Sodium 137 04/18/2017 10:04 AM    Potassium 4.5 04/18/2017 10:04 AM    Chloride 98 04/18/2017 10:04 AM    CO2 21 04/18/2017 10:04 AM    Anion gap 9 01/14/2014 11:35 AM    Glucose 103 04/18/2017 10:04 AM    BUN 17 04/18/2017 10:04 AM    Creatinine 0.67 04/18/2017 10:04 AM    BUN/Creatinine ratio 25 04/18/2017 10:04 AM    GFR est  04/18/2017 10:04 AM    GFR est non- 04/18/2017 10:04 AM    Calcium 9.8 04/18/2017 10:04 AM    Bilirubin, total 0.5 04/18/2017 10:04 AM    AST (SGOT) 18 04/18/2017 10:04 AM    Alk. phosphatase 123 04/18/2017 10:04 AM    Protein, total 6.9 04/18/2017 10:04 AM    Albumin 4.8 04/18/2017 10:04 AM    Globulin 3.6 01/14/2014 11:35 AM    A-G Ratio 2.3 04/18/2017 10:04 AM    ALT (SGPT) 28 04/18/2017 10:04 AM     Lab Results   Component Value Date/Time    Hemoglobin A1c 5.7 04/18/2017 10:04 AM         ASSESSMENT and PLAN    ICD-10-CM ICD-9-CM    1. Hypertension, essential I10 401.9    2. Hyperlipemia, mixed E78.2 272.2    3. Glucose intolerance (impaired glucose tolerance) R73.02 790.22    4. Epigastric pain R10.13 789.06 REFERRAL TO GASTROENTEROLOGY      H. PYLORI BREATH TEST      omeprazole (PRILOSEC) 20 mg capsule   5. Gastroesophageal reflux disease, esophagitis presence not specified K21.9 530.81 REFERRAL TO GASTROENTEROLOGY   6. Colon cancer screening Z12.11 V76.51 REFERRAL TO GASTROENTEROLOGY   7. Type III open displaced comminuted fracture of shaft of left tibia, sequela S82.252S 823.30 REFERRAL TO ORTHOPEDICS   8. Post-traumatic osteoarthritis of one knee, left M17.32 715.26 REFERRAL TO ORTHOPEDICS   9. Unqualified visual loss of left eye with normal vision of contralateral eye H54.62 369.8 REFERRAL TO OPHTHALMOLOGY   10. Weight loss, unintentional R63.4 783.21    11. Encounter for immunization Z23 V03.89 INFLUENZA VIRUS VAC QUAD,SPLIT,PRESV FREE SYRINGE 3/> YRS IM   12. Moderate cigarette smoker (10-19 per day) F17.210 305.1      Diagnoses and all orders for this visit:    1. Hypertension, essential  Hypertension is controlled    2. Hyperlipemia, mixed  Hyperlipidemia is uncontrolled. Has diarrhea with lovastatin and declines trying another. 3. Glucose intolerance (impaired glucose tolerance)    4. Epigastric pain  -     REFERRAL TO GASTROENTEROLOGY  -     H. PYLORI BREATH TEST  -     omeprazole (PRILOSEC) 20 mg capsule; Take one po 30 minutes before breakfast with water. 5. Gastroesophageal reflux disease, esophagitis presence not specified  -     REFERRAL TO GASTROENTEROLOGY    6. Colon cancer screening  -     REFERRAL TO GASTROENTEROLOGY    7. Type III open displaced comminuted fracture of shaft of left tibia, sequela  -     REFERRAL TO ORTHOPEDICS    8. Post-traumatic osteoarthritis of one knee, left  -     REFERRAL TO ORTHOPEDICS    9. Unqualified visual loss of left eye with normal vision of contralateral eye  -     REFERRAL TO OPHTHALMOLOGY    10. Weight loss, unintentional  Related to epigastric pain. Encouraged again to see gastroenterologist.     11. Encounter for immunization  -     Influenza virus vaccine (QUADRIVALENT PRES FREE SYRINGE) IM (41712)    12. Moderate cigarette smoker (10-19 per day)  Encouraged to stop smoking to decrease risk of cancer, pulmonary, and cardiovascular disease. Follow-up Disposition:  Return in about 3 months (around 11/30/2017) for HTN, GERD, gluc, POC A1c.  lab results and schedule of future lab studies reviewed with patient  reviewed diet, exercise and weight control  cardiovascular risk and specific lipid/LDL goals reviewed  I have discussed the diagnosis with the patient and the intended plan as seen in the above orders. Patient is in agreement. The patient has received an after-visit summary and questions were answered concerning future plans.   I have discussed medication side effects and warnings with the patient as well.

## 2017-08-31 NOTE — MR AVS SNAPSHOT
Visit Information Date & Time Provider Department Dept. Phone Encounter #  
 8/31/2017  1:30 PM Zen Giordano MD [address] [place] [address] [phone] [id number] Follow-up Instructions Return in about 3 months (around 11/30/2017) for HTN, GERD, gluc, POC A1c. Upcoming Health Maintenance Date Due COLONOSCOPY 11/30/1977 INFLUENZA AGE 9 TO ADULT 8/1/2017 DTaP/Tdap/Td series (2 - Td) 4/18/2026 Allergies as of 8/31/2017  Review Complete On: 8/31/2017 By: Elina Lerma LPN Severity Noted Reaction Type Reactions Advil [Ibuprofen]  05/30/2011    Hives Fluoxetine  04/18/2017    Diarrhea Lovastatin  01/12/2017    Diarrhea Tylenol [Acetaminophen]  05/30/2011    Hives Current Immunizations  Reviewed on 8/31/2017 Name Date Influenza Vaccine (Quad) PF  Incomplete, 9/20/2016 Pneumococcal Polysaccharide (PPSV-23) 11/11/2016 Tdap 4/18/2016 Reviewed by Elina Lerma LPN on 7/19/7112 at  1:09 PM  
You Were Diagnosed With   
  
 Codes Comments Hypertension, essential    -  Primary ICD-10-CM: I10 
ICD-9-CM: 401.9 Hyperlipemia, mixed     ICD-10-CM: E78.2 ICD-9-CM: 272.2 Glucose intolerance (impaired glucose tolerance)     ICD-10-CM: R73.02 
ICD-9-CM: 790.22 Epigastric pain     ICD-10-CM: R10.13 ICD-9-CM: 789.06 Gastroesophageal reflux disease, esophagitis presence not specified     ICD-10-CM: K21.9 ICD-9-CM: 530.81 Colon cancer screening     ICD-10-CM: Z12.11 ICD-9-CM: V76.51 Type III open displaced comminuted fracture of shaft of left tibia, sequela     ICD-10-CM: S82.252S ICD-9-CM: 823.30 Post-traumatic osteoarthritis of one knee, left     ICD-10-CM: M17.32 
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 
ICD-9-CM: 369.8 Weight loss, unintentional     ICD-10-CM: R63.4 ICD-9-CM: 783.21   
 Encounter for immunization     ICD-10-CM: A69 ICD-9-CM: V03.89 Vitals BP Pulse Temp Resp Height(growth percentile) Weight(growth percentile) 130/82 (BP 1 Location: Left arm, BP Patient Position: Sitting) 69 96.7 °F (35.9 °C) (Oral) 16 5' 8\" (1.727 m) 185 lb 8 oz (84.1 kg) SpO2 BMI Smoking Status 95% 28.21 kg/m2 Current Every Day Smoker Vitals History BMI and BSA Data Body Mass Index Body Surface Area  
 28.21 kg/m 2 2.01 m 2 Preferred Pharmacy Pharmacy Name Phone [facility] [address] [place], [place] - [address] [facility] Colton Pardo [phone] Your Updated Medication List  
  
   
This list is accurate as of: 8/31/17  2:12 PM.  Always use your most recent med list.  
  
  
  
  
 ALEVE 220 mg tablet Generic drug:  naproxen sodium Take 440 mg by mouth daily. calcium carbonate 200 mg calcium (500 mg) Chew Commonly known as:  TUMS Take 1 Tab by mouth daily. lisinopril-hydroCHLOROthiazide 20-12.5 mg per tablet Commonly known as:  Omega  Take 2 Tabs by mouth daily. omeprazole 20 mg capsule Commonly known as:  PRILOSEC Take one po 30 minutes before breakfast with water. Prescriptions Sent to Pharmacy Refills  
 omeprazole (PRILOSEC) 20 mg capsule 0 Sig: Take one po 30 minutes before breakfast with water. Class: Normal  
 Pharmacy: [facility], [address] [place] [address] #: [phone] We Performed the Following H. PYLORI BREATH TEST [45010 CPT(R)] INFLUENZA VIRUS VAC QUAD,SPLIT,PRESV FREE SYRINGE 3/> YRS IM [id number] CPT(R)] REFERRAL TO GASTROENTEROLOGY [DFH59 Custom] REFERRAL TO OPHTHALMOLOGY [REF57 Custom] REFERRAL TO ORTHOPEDICS [WRV235 Custom] Follow-up Instructions Return in about 3 months (around 11/30/2017) for HTN, GERD, gluc, POC A1c. Referral Information Referral ID Referred By Referred To [id number] Fatoumata Graff DO, [facility]   
   [place], [address] Visits Status Start Date End Date 1 New Request 8/31/17 8/31/18 If your referral has a status of pending review or denied, additional information will be sent to support the outcome of this decision. Referral ID Referred By Referred To  
 [id number] [facility], [address], [place]  
   [address] [facility] [address] Phone: [phone] Fax: [phone] Visits Status Start Date End Date 1 New Request 8/31/17 8/31/18 If your referral has a status of pending review or denied, additional information will be sent to support the outcome of this decision. Referral ID Referred By Referred To  
 [id number] [facility], [address], [place]  
   [address] [facility] [address] [place], [address] [facility] Phone: [phone] Fax: [phone] Visits Status Start Date End Date 1 New Request 8/31/17 8/31/18 If your referral has a status of pending review or denied, additional information will be sent to support the outcome of this decision. Patient Instructions Try melatonin 3 mg about 1-2 hours before you go to bed to help with sleep. Start omeprazole 20 mg once a day for stomach problems. Take on an empty stomach, at least 30 minutes before eating, with water. See gastroenterologist, eye doctor and orthopedic doctor. Office visit in 3 months. Insomnia: Care Instructions Your Care Instructions Insomnia is the inability to sleep well. It is a common problem for most people at some time. Insomnia may make it hard for you to get to sleep, stay asleep, or sleep as long as you need to. This can make you tired and grouchy during the day. It can also make you forgetful, less effective at work, and unhappy. Insomnia can be caused by conditions such as depression or anxiety.  Pain can also affect your ability to sleep. When these problems are solved, the insomnia usually clears up. But sometimes bad sleep habits can cause insomnia. If insomnia is affecting your work or your enjoyment of life, you can take steps to improve your sleep. Follow-up care is a key part of your treatment and safety. Be sure to make and go to all appointments, and call your doctor if you are having problems. It's also a good idea to know your test results and keep a list of the medicines you take. How can you care for yourself at home? What to avoid · Do not have drinks with caffeine, such as coffee or black tea, for 8 hours before bed. · Do not smoke or use other types of tobacco near bedtime. Nicotine is a stimulant and can keep you awake. · Avoid drinking alcohol late in the evening, because it can cause you to wake in the middle of the night. · Do not eat a big meal close to bedtime. If you are hungry, eat a light snack. · Do not drink a lot of water close to bedtime, because the need to urinate may wake you up during the night. · Do not read or watch TV in bed. Use the bed only for sleeping and sexual activity. What to try · Go to bed at the same time every night, and wake up at the same time every morning. Do not take naps during the day. · Keep your bedroom quiet, dark, and cool. · Sleep on a comfortable pillow and mattress. · If watching the clock makes you anxious, turn it facing away from you so you cannot see the time. · If you worry when you lie down, start a worry book. Well before bedtime, write down your worries, and then set the book and your concerns aside. · Try meditation or other relaxation techniques before you go to bed. · If you cannot fall asleep, get up and go to another room until you feel sleepy. Do something relaxing. Repeat your bedtime routine before you go to bed again. · Make your house quiet and calm about an hour before bedtime.  Turn down the lights, turn off the TV, log off the computer, and turn down the volume on music. This can help you relax after a busy day. When should you call for help? Watch closely for changes in your health, and be sure to contact your doctor if: 
· Your efforts to improve your sleep do not work. · Your insomnia gets worse. · You have been feeling down, depressed, or hopeless or have lost interest in things that you usually enjoy. Where can you learn more? Go to http://nehemiah-nadia.info/. Enter P513 in the search box to learn more about \"Insomnia: Care Instructions. \" Current as of: July 26, 2016 Content Version: 11.3 © 0575-4744 [employer]. Care instructions adapted under license by [employer] (which disclaims liability or warranty for this information). If you have questions about a medical condition or this instruction, always ask your healthcare professional. [place] [address] any warranty or liability for your use of this information. Learning About Sleeping Well What does sleeping well mean? Sleeping well means getting enough sleep. How much sleep is enough varies among people. The number of hours you sleep is not as important as how you feel when you wake up. If you do not feel refreshed, you probably need more sleep. Another sign of not getting enough sleep is feeling tired during the day. The average total nightly sleep time is 7½ to 8 hours. Healthy adults may need a little more or a little less than this. Why is getting enough sleep important? Getting enough quality sleep is a basic part of good health. When your sleep suffers, your mood and your thoughts can suffer too. You may find yourself feeling more grumpy or stressed. Not getting enough sleep also can lead to serious problems, including injury, accidents, anxiety, and depression. What might cause poor sleeping? Many things can cause sleep problems, including: · Stress. Stress can be caused by fear about a single event, such as giving a speech. Or you may have ongoing stress, such as worry about work or school. · Depression, anxiety, and other mental or emotional conditions. · Changes in your sleep habits or surroundings. This includes changes that happen where you sleep, such as noise, light, or sleeping in a different bed. It also includes changes in your sleep pattern, such as having jet lag or working a late shift. · Health problems, such as pain, breathing problems, and restless legs syndrome. · Lack of regular exercise. How can you help yourself? Here are some tips that may help you sleep more soundly and wake up feeling more refreshed. Your sleeping area · Use your bedroom only for sleeping and sex. A bit of light reading may help you fall asleep. But if it doesn't, do your reading elsewhere in the house. Don't watch TV in bed. · Be sure your bed is big enough to stretch out comfortably, especially if you have a sleep partner. · Keep your bedroom quiet, dark, and cool. Use curtains, blinds, or a sleep mask to block out light. To block out noise, use earplugs, soothing music, or a \"white noise\" machine. Your evening and bedtime routine · Create a relaxing bedtime routine. You might want to take a warm shower or bath, listen to soothing music, or drink a cup of noncaffeinated tea. · Go to bed at the same time every night. And get up at the same time every morning, even if you feel tired. What to avoid · Limit caffeine (coffee, tea, caffeinated sodas) during the day, and don't have any for at least 4 to 6 hours before bedtime. · Don't drink alcohol before bedtime. Alcohol can cause you to wake up more often during the night. · Don't smoke or use tobacco, especially in the evening. Nicotine can keep you awake. · Don't take naps during the day, especially close to bedtime. · Don't lie in bed awake for too long.  If you can't fall asleep, or if you wake up in the middle of the night and can't get back to sleep within 15 minutes or so, get out of bed and go to another room until you feel sleepy. · Don't take medicine right before bed that may keep you awake or make you feel hyper or energized. Your doctor can tell you if your medicine may do this and if you can take it earlier in the day. If you can't sleep · Imagine yourself in a peaceful, pleasant scene. Focus on the details and feelings of being in a place that is relaxing. · Get up and do a quiet or boring activity until you feel sleepy. · Don't drink any liquids after 6 p.m. if you wake up often because you have to go to the bathroom. Where can you learn more? Go to http://nehemiah-nadia.info/. Enter N396 in the search box to learn more about \"Learning About Sleeping Well. \" Current as of: July 26, 2016 Content Version: 11.3 © 4815-8281 [employer]. Care instructions adapted under license by [employer] (which disclaims liability or warranty for this information). If you have questions about a medical condition or this instruction, always ask your healthcare professional. [place] [address] any warranty or liability for your use of this information. Vaccine Information Statement Influenza (Flu) Vaccine (Inactivated or Recombinant): What you need to know Many Vaccine Information Statements are available in Romanian and other languages. See www.immunize.org/vis Hojas de Información Sobre Vacunas están disponibles en Español y en muchos otros idiomas. Visite www.immunize.org/vis 1. Why get vaccinated? Influenza (flu) is a contagious disease that spreads around the [place] every year, usually between October and May. Flu is caused by influenza viruses, and is spread mainly by coughing, sneezing, and close contact. Anyone can get flu. Flu strikes suddenly and can last several days. Symptoms vary by age, but can include: 
 fever/chills  sore throat  muscle aches  fatigue  cough  headache  runny or stuffy nose Flu can also lead to pneumonia and blood infections, and cause diarrhea and seizures in children. If you have a medical condition, such as heart or lung disease, flu can make it worse. Flu is more dangerous for some people. Infants and young children, people 72years of age and older, pregnant women, and people with certain health conditions or a weakened immune system are at greatest risk. Each year thousands of people in the [facility] die from flu, and many more are hospitalized. Flu vaccine can: 
 keep you from getting flu, 
 make flu less severe if you do get it, and 
 keep you from spreading flu to your family and other people. 2. Inactivated and recombinant flu vaccines A dose of flu vaccine is recommended every flu season. Children 6 months through 6years of age may need two doses during the same flu season. Everyone else needs only one dose each flu season. Some inactivated flu vaccines contain a very small amount of a mercury-based preservative called thimerosal. Studies have not shown thimerosal in vaccines to be harmful, but flu vaccines that do not contain thimerosal are available. There is no live flu virus in flu shots. They cannot cause the flu. There are many flu viruses, and they are always changing. Each year a new flu vaccine is made to protect against three or four viruses that are likely to cause disease in the upcoming flu season. But even when the vaccine doesnt exactly match these viruses, it may still provide some protection Flu vaccine cannot prevent: 
 flu that is caused by a virus not covered by the vaccine, or 
 illnesses that look like flu but are not. It takes about 2 weeks for protection to develop after vaccination, and protection lasts through the flu season. 3. Some people should not get this vaccine Tell the person who is giving you the vaccine:  If you have any severe, life-threatening allergies. If you ever had a life-threatening allergic reaction after a dose of flu vaccine, or have a severe allergy to any part of this vaccine, you may be advised not to get vaccinated. Most, but not all, types of flu vaccine contain a small amount of egg protein.  If you ever had Guillain-Barré Syndrome (also called GBS). Some people with a history of GBS should not get this vaccine. This should be discussed with your doctor.  If you are not feeling well. It is usually okay to get flu vaccine when you have a mild illness, but you might be asked to come back when you feel better. 4. Risks of a vaccine reaction With any medicine, including vaccines, there is a chance of reactions. These are usually mild and go away on their own, but serious reactions are also possible. Most people who get a flu shot do not have any problems with it. Minor problems following a flu shot include:  
 soreness, redness, or swelling where the shot was given  hoarseness  sore, red or itchy eyes  cough  fever  aches  headache  itching  fatigue If these problems occur, they usually begin soon after the shot and last 1 or 2 days. More serious problems following a flu shot can include the following:  There may be a small increased risk of Guillain-Barré Syndrome (GBS) after inactivated flu vaccine. This risk has been estimated at 1 or 2 additional cases per million people vaccinated. This is much lower than the risk of severe complications from flu, which can be prevented by flu vaccine.  Young children who get the flu shot along with pneumococcal vaccine (PCV13) and/or DTaP vaccine at the same time might be slightly more likely to have a seizure caused by fever. Ask your doctor for more information. Tell your doctor if a child who is getting flu vaccine has ever had a seizure. Problems that could happen after any injected vaccine:  People sometimes faint after a medical procedure, including vaccination. Sitting or lying down for about 15 minutes can help prevent fainting, and injuries caused by a fall. Tell your doctor if you feel dizzy, or have vision changes or ringing in the ears.  Some people get severe pain in the shoulder and have difficulty moving the arm where a shot was given. This happens very rarely.  Any medication can cause a severe allergic reaction. Such reactions from a vaccine are very rare, estimated at about 1 in a million doses, and would happen within a few minutes to a few hours after the vaccination. As with any medicine, there is a very remote chance of a vaccine causing a serious injury or death. The safety of vaccines is always being monitored. For more information, visit: www.cdc.gov/vaccinesafety/ 
 
 
6. The National Vaccine Injury [employer] 
 
 The "[employer] Injury Compensation Program (VICP) is a federal program that was created to compensate people who may have been injured by certain vaccines. Persons who believe they may have been injured by a vaccine can learn about the program and about filing a claim by calling 7-334.786.7999 or visiting the [employer] website at www.[facility].gov/vaccinecompensation. There is a time limit to file a claim for compensation. 7. How can I learn more?  Ask your healthcare provider. He or she can give you the vaccine package insert or suggest other sources of information.  Call your local or state health department.  Contact the Centers for Disease Control and Prevention (CDC): 
- Call 2-116.714.7925 (1-800-CDC-INFO) or 
- Visit CDCs website at www.cdc.gov/flu Vaccine Information Statement Inactivated Influenza Vaccine 8/7/2015 
[address] MINOR Moore [id number]-63 Department of [facility] and [employer] Centers for Disease Control and Prevention Office Use Only Introducing [facility]! Yeimi Cabrera introduces Shots patient portal. Now you can access parts of your medical record, email your doctor's office, and request medication refills online. 1. In your internet browser, go to [email or web address] 2. Click on the First Time User? Click Here link in the Sign In box. You will see the New Member Sign Up page. 3. Enter your Shots Access Code exactly as it appears below. You will not need to use this code after youve completed the sign-up process. If you do not sign up before the expiration date, you must request a new code. · Shots Access Code: [email or web address] Expires: 11/29/2017  2:05 PM 
 
4. Enter the last four digits of your Social Security Number (xxxx) and Date of Birth (mm/dd/yyyy) as indicated and click Submit. You will be taken to the next sign-up page. 5. Create a Shots ID.  This will be your Shots login ID and cannot be changed, so think of one that is secure and easy to remember. 6. Create a PriceAdvice password. You can change your password at any time. 7. Enter your Password Reset Question and Answer. This can be used at a later time if you forget your password. 8. Enter your e-mail address. You will receive e-mail notification when new information is available in [address] 9. Click Sign Up. You can now view and download portions of your medical record. 10. Click the Download Summary menu link to download a portable copy of your medical information. If you have questions, please visit the Frequently Asked Questions section of the PriceAdv[employer] website. Remember, PriceAdvice is NOT to be used for urgent needs. For medical emergencies, dial 911. Now available from your iPhone and Android! Please provide this summary of care documentation to your next provider. Your primary care clinician is listed as Titus Sutton. If you have any questions after today's visit, please call [phone].

## 2017-09-01 LAB — UREA BREATH TEST QL: NEGATIVE

## 2017-09-04 PROBLEM — F17.210 MODERATE CIGARETTE SMOKER (10-19 PER DAY): Status: ACTIVE | Noted: 2017-09-04

## 2017-11-27 ENCOUNTER — OFFICE VISIT (OUTPATIENT)
Dept: INTERNAL MEDICINE CLINIC | Age: 57
End: 2017-11-27

## 2017-11-27 VITALS
RESPIRATION RATE: 20 BRPM | WEIGHT: 192 LBS | BODY MASS INDEX: 29.1 KG/M2 | SYSTOLIC BLOOD PRESSURE: 134 MMHG | HEART RATE: 77 BPM | OXYGEN SATURATION: 97 % | TEMPERATURE: 96.4 F | HEIGHT: 68 IN | DIASTOLIC BLOOD PRESSURE: 74 MMHG

## 2017-11-27 DIAGNOSIS — R73.02 GLUCOSE INTOLERANCE (IMPAIRED GLUCOSE TOLERANCE): ICD-10-CM

## 2017-11-27 DIAGNOSIS — I10 HYPERTENSION, ESSENTIAL: Primary | ICD-10-CM

## 2017-11-27 DIAGNOSIS — M25.562 CHRONIC PAIN OF LEFT KNEE: ICD-10-CM

## 2017-11-27 DIAGNOSIS — S82.132S: ICD-10-CM

## 2017-11-27 DIAGNOSIS — J44.9 CHRONIC OBSTRUCTIVE PULMONARY DISEASE, UNSPECIFIED COPD TYPE (HCC): ICD-10-CM

## 2017-11-27 DIAGNOSIS — M17.12 OSTEOARTHRITIS OF LEFT KNEE, UNSPECIFIED OSTEOARTHRITIS TYPE: ICD-10-CM

## 2017-11-27 DIAGNOSIS — E78.2 HYPERLIPEMIA, MIXED: ICD-10-CM

## 2017-11-27 DIAGNOSIS — R25.2 LEG CRAMPS: ICD-10-CM

## 2017-11-27 DIAGNOSIS — G89.29 CHRONIC PAIN OF LEFT KNEE: ICD-10-CM

## 2017-11-27 LAB — HBA1C MFR BLD HPLC: 5.3 % (ref 4.8–5.6)

## 2017-11-27 RX ORDER — DULOXETIN HYDROCHLORIDE 30 MG/1
30 CAPSULE, DELAYED RELEASE ORAL DAILY
Qty: 7 CAP | Refills: 0 | Status: SHIPPED | OUTPATIENT
Start: 2017-11-27 | End: 2018-05-29

## 2017-11-27 RX ORDER — DULOXETIN HYDROCHLORIDE 60 MG/1
60 CAPSULE, DELAYED RELEASE ORAL DAILY
Qty: 30 CAP | Refills: 3 | Status: SHIPPED | OUTPATIENT
Start: 2017-11-27 | End: 2018-05-29

## 2017-11-27 RX ORDER — PYRIDOXINE HCL (VITAMIN B6) 100 MG
100 TABLET ORAL DAILY
Qty: 90 TAB | Refills: 3 | Status: SHIPPED | OUTPATIENT
Start: 2017-11-27 | End: 2018-05-29

## 2017-11-27 RX ORDER — OMEPRAZOLE 40 MG/1
40 CAPSULE, DELAYED RELEASE ORAL 2 TIMES DAILY
COMMUNITY
End: 2018-05-29 | Stop reason: SDUPTHER

## 2017-11-27 NOTE — PROGRESS NOTES
HISTORY OF PRESENT ILLNESS  Iram Patel is a 61 y.o. male. HPI  He presents for follow up of hypertension and glucose intolerance. Diet and Lifestyle: generally follows a low fat low cholesterol diet, generally follows a low sodium diet, follows a diabetic diet regularly, sedentary, smoker 1 ppd  Medication compliance: compliant all of the time  Medication side effects: none  Home BP Monitoring:  is not measured at home  Cardiovascular ROS:  He complains of dizziness. He denies palpitations, orthopnea, exertional chest pressure/discomfort, claudication, lower extremity edema, dyspnea on exertion     He presents for follow up of gastroesophageal reflux. He is currently treating this with omeprazole. He has tried H2 blockers with inadequate relief of symptoms. Current symptoms include heartburn about 3 times a week. Ronald Giordano He denies dysphagia, has had a weight loss of 9 pounds over a period of 12 months, denies dysphagia. He is seen for followup of depression and anxiety. What \"gets me down is pain in knee. I need some relief. \" He did not follow up on referral to PM&R.    Ongoing symptoms include insomnia  He denies depressed mood, anhedonia, feelings of worthlessness/guilt, difficulty concentrating, hopelessness and recurrent thoughts of death,       Patient Active Problem List   Diagnosis Code    Adenoma of right adrenal gland D35.01    Pulmonary nodules/lesions, multiple R91.8    Smoker F17.200    GERD (gastroesophageal reflux disease) K21.9    Osteoarthritis of left knee M17.12    COPD (chronic obstructive pulmonary disease) ([facility] J44.9    Depression with anxiety F41.8    Hyperlipemia, mixed E78.2    Hypertension, essential I10    Noncompliance with medication regimen Z91.14    Glucose intolerance (impaired glucose tolerance) R73.02    Moderate cigarette smoker (10-19 per day) F17.210     Past Medical History:   Diagnosis Date    Adrenal mass, right ([address] [address] 2014    Seen on abd CT    CAD (coronary artery disease)     calcific atherosclerosis on CT    Depression     Hypercholesterolemia     Hypertension     Multiple lung nodules      Past Surgical History:   Procedure Laterality Date    HX ORTHOPAEDIC      hand    HX ORTHOPAEDIC      foot     Social History     Social History    Marital status: SINGLE     Spouse name: N/A    Number of children: N/A    Years of education: N/A     Social History Main Topics    Smoking status: Current Every Day Smoker     Packs/day: 1.00     Types: Cigarettes    Smokeless tobacco: Never Used    Alcohol use No      Comment: stopped jan 2014    Drug use: No    Sexual activity: Not Asked     Other Topics Concern    None     Social History Narrative     Family History   Problem Relation Age of Onset    Heart Disease Mother     Asthma Mother     Elevated Lipids Mother     Hypertension Mother      Allergies   Allergen Reactions    Advil [Ibuprofen] Hives    Fluoxetine Diarrhea    Lovastatin Diarrhea    Tylenol [Acetaminophen] Hives     Current Outpatient Prescriptions   Medication Sig Dispense Refill    omeprazole (PRILOSEC) 40 mg capsule Take 40 mg by mouth two (2) times a day.  lisinopril-hydroCHLOROthiazide (PRINZIDE, ZESTORETIC) 20-12.5 mg per tablet Take 2 Tabs by mouth daily. 180 Tab 1    naproxen sodium (ALEVE) 220 mg tablet Take 440 mg by mouth daily.  calcium carbonate (TUMS) 200 mg calcium (500 mg) chew Take 1 Tab by mouth daily.  omeprazole (PRILOSEC) 20 mg capsule Take one po 30 minutes before breakfast with water. 30 Cap 0     Review of Systems   Constitutional: Negative for malaise/fatigue and weight loss. Gastrointestinal: Positive for diarrhea (comes and goes). Negative for constipation and heartburn. Musculoskeletal: Negative for back pain and joint pain. Muscle spasms in left leg   Neurological: Positive for tingling (left leg). Negative for dizziness and focal weakness.      Visit Vitals    /74 (BP 1 Location: Left arm, BP Patient Position: Sitting)    Pulse 77    Temp 96.4 °F (35.8 °C) (Oral)    Resp 20    Ht 5' 8\" (1.727 m)    Wt 192 lb (87.1 kg)    SpO2 97%    BMI 29.19 kg/m2     Physical Exam   Constitutional: He is oriented to person, place, and time. He appears well-developed and well-nourished. HENT:   Head: Normocephalic and atraumatic. Eyes: Conjunctivae are normal. Pupils are equal, round, and reactive to light. Neck: Neck supple. Carotid bruit is not present. No thyromegaly present. Cardiovascular: Normal rate, regular rhythm and normal heart sounds. PMI is not displaced. Exam reveals no gallop. No murmur heard. Pulses:       Dorsalis pedis pulses are 2+ on the right side, and 2+ on the left side. Posterior tibial pulses are 2+ on the right side, and 2+ on the left side. Pulmonary/Chest: Effort normal. He has no wheezes. He has no rhonchi. He has no rales. Abdominal: Soft. Normal appearance. He exhibits no abdominal bruit and no mass. There is no hepatosplenomegaly. There is no tenderness. Musculoskeletal: He exhibits no edema. Left leg about 1 inch shorter than right. Lymphadenopathy:     He has no cervical adenopathy. Right: No supraclavicular adenopathy present. Left: No supraclavicular adenopathy present. Neurological: He is alert and oriented to person, place, and time. No sensory deficit. Skin: Skin is warm, dry and intact. No rash noted. Psychiatric: He has a normal mood and affect. His behavior is normal.   Nursing note and vitals reviewed.     Results for orders placed or performed in visit on 11/27/17   AMB POC HEMOGLOBIN A1C   Result Value Ref Range    Hemoglobin A1c (POC) 5.3 4.8 - 5.6 %     Lab Results   Component Value Date/Time    Cholesterol, total 302 04/18/2017 10:04 AM    HDL Cholesterol 46 04/18/2017 10:04 AM    LDL, calculated 227 04/18/2017 10:04 AM    VLDL, calculated 29 04/18/2017 10:04 AM    Triglyceride 144 04/18/2017 10:04 AM Lab Results   Component Value Date/Time    Sodium 137 04/18/2017 10:04 AM    Potassium 4.5 04/18/2017 10:04 AM    Chloride 98 04/18/2017 10:04 AM    CO2 21 04/18/2017 10:04 AM    Anion gap 9 01/14/2014 11:35 AM    Glucose 103 04/18/2017 10:04 AM    BUN 17 04/18/2017 10:04 AM    Creatinine 0.67 04/18/2017 10:04 AM    BUN/Creatinine ratio 25 04/18/2017 10:04 AM    GFR est  04/18/2017 10:04 AM    GFR est non- 04/18/2017 10:04 AM    Calcium 9.8 04/18/2017 10:04 AM    Bilirubin, total 0.5 04/18/2017 10:04 AM    AST (SGOT) 18 04/18/2017 10:04 AM    Alk. phosphatase 123 04/18/2017 10:04 AM    Protein, total 6.9 04/18/2017 10:04 AM    Albumin 4.8 04/18/2017 10:04 AM    Globulin 3.6 01/14/2014 11:35 AM    A-G Ratio 2.3 04/18/2017 10:04 AM    ALT (SGPT) 28 04/18/2017 10:04 AM       ASSESSMENT and PLAN    ICD-10-CM ICD-9-CM    1. Hypertension, essential I10 401.9    2. Hyperlipemia, mixed E78.2 272.2 LIPID PANEL      METABOLIC PANEL, COMPREHENSIVE   3. Glucose intolerance (impaired glucose tolerance) R73.02 790.22 AMB POC HEMOGLOBIN A1C   4. Osteoarthritis of left knee, unspecified osteoarthritis type M17.12 715.96 REFERRAL TO ORTHOPEDICS   5. Type I or II open fracture of medial portion of left tibial plateau, sequela M93.291O 905.4 REFERRAL TO ORTHOPEDICS      REFERRAL TO PHYSICIAL MEDICINE REHAB   6. Chronic obstructive pulmonary disease, unspecified COPD type ([facility] J44.9 496    7. Chronic pain of left knee M25.562 719.46 DULoxetine (CYMBALTA) 30 mg capsule    G89.29 338.29 DULoxetine (CYMBALTA) 60 mg capsule   8. Leg cramps R25.2 729.82 pyridoxine, vitamin B6, (VITAMIN B-6) 100 mg tablet     Diagnoses and all orders for this visit:    1. Hypertension, essential  Hypertension is controlled    2. Hyperlipemia, mixed  Hyperlipidemia is not controlled. He declines cholesterol lowering medication.   -     LIPID PANEL; Future  -     METABOLIC PANEL, COMPREHENSIVE; Future    3.  Glucose intolerance (impaired glucose tolerance)  -     AMB POC HEMOGLOBIN A1C    4. Osteoarthritis of left knee, unspecified osteoarthritis type  -     REFERRAL TO ORTHOPEDICS    5. Type I or II open fracture of medial portion of left tibial plateau, sequela  -     REFERRAL TO ORTHOPEDICS  -     REFERRAL TO PHYSICIAL MEDICINE REHAB    6. Chronic obstructive pulmonary disease, unspecified COPD type ([facility]    7. Chronic pain of left knee  -     DULoxetine (CYMBALTA) 30 mg capsule; Take 1 Cap by mouth daily. After 7 days go to 60 mg capsule  Indications: CHRONIC MUSCULOSKELETAL PAIN  -     DULoxetine (CYMBALTA) 60 mg capsule; Take 1 Cap by mouth daily. Take after 7 days at 30 mg. Indications: CHRONIC MUSCULOSKELETAL PAIN    8. Leg cramps  -     pyridoxine, vitamin B6, (VITAMIN B-6) 100 mg tablet; Take 1 Tab by mouth daily. Follow-up Disposition:  Return in about 6 months (around 5/27/2018) for HTN, chol, COPD Fasting lab one week prior . lab results and schedule of future lab studies reviewed with patient  reviewed diet, exercise and weight control  cardiovascular risk and specific lipid/LDL goals reviewed  I have discussed the diagnosis with the patient and the intended plan as seen in the above orders. Patient is in agreement. The patient has received an after-visit summary and questions were answered concerning future plans. I have discussed medication side effects and warnings with the patient as well.

## 2017-11-27 NOTE — MR AVS SNAPSHOT
Visit Information Date & Time Provider Department Dept. Phone Encounter #  
 11/27/2017 10:00 AM Corine Johns, [facility] [phone] [id number] Follow-up Instructions Return in about 6 months (around 5/27/2018) for HTN, chol, COPD Fasting lab one week prior . Routing History Upcoming Health Maintenance Date Due COLONOSCOPY 11/30/1977 DTaP/Tdap/Td series (2 - Td) 4/18/2026 Allergies as of 11/27/2017  Review Complete On: 11/27/2017 By: Corine Johns MD  
  
 Severity Noted Reaction Type Reactions Advil [Ibuprofen]  05/30/2011    Hives Fluoxetine  04/18/2017    Diarrhea Lovastatin  01/12/2017    Diarrhea Tylenol [Acetaminophen]  05/30/2011    Hives Current Immunizations  Reviewed on 11/27/2017 Name Date Influenza Vaccine (Quad) PF 8/31/2017, 9/20/2016 Pneumococcal Polysaccharide (PPSV-23) 11/11/2016 Tdap 4/18/2016 Reviewed by Summer Vu LPN on 14/90/3430 at  9:46 AM  
You Were Diagnosed With   
  
 Codes Comments Hypertension, essential    -  Primary ICD-10-CM: I10 
ICD-9-CM: 401.9 Hyperlipemia, mixed     ICD-10-CM: E78.2 ICD-9-CM: 272.2 Glucose intolerance (impaired glucose tolerance)     ICD-10-CM: R73.02 
ICD-9-CM: 790.22 Osteoarthritis of left knee, unspecified osteoarthritis type     ICD-10-CM: M17.12 
ICD-9-CM: 715.96 Type I or II open fracture of medial portion of left tibial plateau, sequela     ICD-10-CM: S82.132S ICD-9-CM: 184. 4 Chronic obstructive pulmonary disease, unspecified COPD type ([facility]     ICD-10-CM: J44.9 ICD-9-CM: 053 Chronic pain of left knee     ICD-10-CM: M25.562, G89.29 ICD-9-CM: 719.46, 338.29 Leg cramps     ICD-10-CM: R25.2 ICD-9-CM: 729.82 Vitals BP Pulse Temp Resp Height(growth percentile) Weight(growth percentile)  134/74 (BP 1 Location: Left arm, BP Patient Position: Sitting) 77 96.4 °F (35.8 °C) (Oral) 20 5' 8\" (1.727 m) 192 lb (87.1 kg) SpO2 BMI Smoking Status 97% 29.19 kg/m2 Current Every Day Smoker Vitals History BMI and BSA Data Body Mass Index Body Surface Area  
 29.19 kg/m 2 2.04 m 2 Preferred Pharmacy Pharmacy Name Phone [facility] [address] [place], [place] - [address] [place] [address] [phone] Your Updated Medication List  
  
   
This list is accurate as of: 11/27/17 10:48 AM.  Always use your most recent med list.  
  
  
  
  
 ALEVE 220 mg tablet Generic drug:  naproxen sodium Take 440 mg by mouth daily. calcium carbonate 200 mg calcium (500 mg) Chew Commonly known as:  TUMS Take 1 Tab by mouth daily. * DULoxetine 30 mg capsule Commonly known as:  CYMBALTA Take 1 Cap by mouth daily. After 7 days go to 60 mg capsule  Indications: CHRONIC MUSCULOSKELETAL PAIN  
  
 * DULoxetine 60 mg capsule Commonly known as:  CYMBALTA Take 1 Cap by mouth daily. Take after 7 days at 30 mg. Indications: CHRONIC MUSCULOSKELETAL PAIN  
  
 lisinopril-hydroCHLOROthiazide 20-12.5 mg per tablet Commonly known as:  [place] Take 2 Tabs by mouth daily. * omeprazole 40 mg capsule Commonly known as:  PRILOSEC Take 40 mg by mouth two (2) times a day. * omeprazole 20 mg capsule Commonly known as:  PRILOSEC Take one po 30 minutes before breakfast with water. pyridoxine (vitamin B6) 100 mg tablet Commonly known as:  VITAMIN B-6 Take 1 Tab by mouth daily. * Notice: This list has 4 medication(s) that are the same as other medications prescribed for you. Read the directions carefully, and ask your doctor or other care provider to review them with you. Prescriptions Sent to Pharmacy Refills DULoxetine (CYMBALTA) 30 mg capsule 0 Sig: Take 1 Cap by mouth daily. After 7 days go to 60 mg capsule  Indications: CHRONIC MUSCULOSKELETAL PAIN  Class: Normal  
 Pharmacy: [address] Ph #: [phone] Route: Oral  
 DULoxetine (CYMBALTA) 60 mg capsule 3 Sig: Take 1 Cap by mouth daily. Take after 7 days at 30 mg. Indications: CHRONIC MUSCULOSKELETAL PAIN Class: Normal  
 Pharmacy: [address] Ph #: [phone] Route: Oral  
 pyridoxine, vitamin B6, (VITAMIN B-6) 100 mg tablet 3 Sig: Take 1 Tab by mouth daily. Class: Normal  
 Pharmacy: [address] Ph #: [phone] Route: Oral  
  
We Performed the Following AMB POC HEMOGLOBIN A1C [72606 CPT(R)] REFERRAL TO ORTHOPEDICS [BPT412 Custom] REFERRAL TO PHYSICIAL MEDICINE REHAB [IEK48 Custom] Follow-up Instructions Return in about 6 months (around 5/27/2018) for HTN, chol, COPD Fasting lab one week prior . To-Do List   
 05/27/2018 Lab:  LIPID PANEL   
  
 05/27/2018 Lab:  METABOLIC PANEL, COMPREHENSIVE Referral Information Referral ID Referred By Referred To  
  
 [id number] [facility], [address] [place] [address], MD   
   [address] Phone: [phone] Fax: [phone] Visits Status Start Date End Date 1 New Request 11/27/17 11/27/18 If your referral has a status of pending review or denied, additional information will be sent to support the outcome of this decision. Referral ID Referred By Referred To  
 [id number] Yolanda BRADFORD [address]  
   [address] [place], [address] [facility] Visits Status Start Date End Date 1 New Request 11/27/17 11/27/18 If your referral has a status of pending review or denied, additional information will be sent to support the outcome of this decision. Patient Instructions Try duloxetine starting at 30 mg once a day for 7 days, then 60 mg daily. Give us a call in about a month and let us know if this is helping. See physical medicine and rehab about the knee pain Office visit in 6 months with fasting lab work a week before visit. DASH Diet: Care Instructions Your Care Instructions The DASH diet is an eating plan that can help lower your blood pressure. DASH stands for Dietary Approaches to Stop Hypertension. Hypertension is high blood pressure. The DASH diet focuses on eating foods that are high in calcium, potassium, and magnesium. These nutrients can lower blood pressure. The foods that are highest in these nutrients are fruits, vegetables, low-fat dairy products, nuts, seeds, and legumes. But taking calcium, potassium, and magnesium supplements instead of eating foods that are high in those nutrients does not have the same effect. The DASH diet also includes whole grains, fish, and poultry. The DASH diet is one of several lifestyle changes your doctor may recommend to lower your high blood pressure. Your doctor may also want you to decrease the amount of sodium in your diet. Lowering sodium while following the DASH diet can lower blood pressure even further than just the DASH diet alone. Follow-up care is a key part of your treatment and safety. Be sure to make and go to all appointments, and call your doctor if you are having problems. It's also a good idea to know your test results and keep a list of the medicines you take. How can you care for yourself at home? Following the DASH diet · Eat 4 to 5 servings of fruit each day. A serving is 1 medium-sized piece of fruit, ½ cup chopped or canned fruit, 1/4 cup dried fruit, or 4 ounces (½ cup) of fruit juice. Choose fruit more often than fruit juice. · Eat 4 to 5 servings of vegetables each day. A serving is 1 cup of lettuce or raw leafy vegetables, ½ cup of chopped or cooked vegetables, or 4 ounces (½ cup) of vegetable juice. Choose vegetables more often than vegetable juice. · Get 2 to 3 servings of low-fat and fat-free dairy each day. A serving is 8 ounces of milk, 1 cup of yogurt, or 1 ½ ounces of cheese. · Eat 6 to 8 servings of grains each day. A serving is 1 slice of bread, 1 ounce of dry cereal, or ½ cup of cooked rice, pasta, or cooked cereal. Try to choose whole-grain products as much as possible. · Limit lean meat, poultry, and fish to 2 servings each day. A serving is 3 ounces, about the size of a deck of cards. · Eat 4 to 5 servings of nuts, seeds, and legumes (cooked dried beans, lentils, and split peas) each week. A serving is 1/3 cup of nuts, 2 tablespoons of seeds, or ½ cup of cooked beans or peas. · Limit fats and oils to 2 to 3 servings each day. A serving is 1 teaspoon of vegetable oil or 2 tablespoons of salad dressing. · Limit sweets and added sugars to 5 servings or less a week. A serving is 1 tablespoon jelly or jam, ½ cup sorbet, or 1 cup of lemonade. · Eat less than 2,300 milligrams (mg) of sodium a day. If you limit your sodium to 1,500 mg a day, you can lower your blood pressure even more. Tips for success · Start small. Do not try to make dramatic changes to your diet all at once. You might feel that you are missing out on your favorite foods and then be more likely to not follow the plan. Make small changes, and stick with them. Once those changes become habit, add a few more changes. · Try some of the following: ¨ Make it a goal to eat a fruit or vegetable at every meal and at snacks. This will make it easy to get the recommended amount of fruits and vegetables each day. ¨ Try yogurt topped with fruit and nuts for a snack or healthy dessert. ¨ Add lettuce, tomato, cucumber, and onion to sandwiches. ¨ Combine a ready-made pizza crust with low-fat mozzarella cheese and lots of vegetable toppings. Try using tomatoes, squash, spinach, broccoli, carrots, cauliflower, and onions. ¨ Have a variety of cut-up vegetables with a low-fat dip as an appetizer instead of chips and dip. ¨ Sprinkle sunflower seeds or chopped almonds over salads. Or try adding chopped walnuts or almonds to cooked vegetables. ¨ Try some vegetarian meals using beans and peas. Add garbanzo or kidney beans to salads. Make burritos and tacos with mashed chun beans or black beans. Where can you learn more? Go to http://nehemiah-nadia.info/. Enter C114 in the search box to learn more about \"DASH Diet: Care Instructions. \" Current as of: September 21, 2016 Content Version: 11.4 © 8164-0517 [employer]. Care instructions adapted under license by [employer] (which disclaims liability or warranty for this information). If you have questions about a medical condition or this instruction, always ask your healthcare professional. [place] [address] any warranty or liability for your use of this information. Introducing [facility]! Otilio Gregorio introduces [employer] patient portal. Now you can access parts of your medical record, email your doctor's office, and request medication refills online. 1. In your internet browser, go to [email or web address] 2. Click on the First Time User? Click Here link in the Sign In box. You will see the New Member Sign Up page. 3. Enter your [employer] Access Code exactly as it appears below. You will not need to use this code after youve completed the sign-up process. If you do not sign up before the expiration date, you must request a new code. · [employer] Access Code: [email or web address] Expires: 11/29/2017  1:05 PM 
 
4. Enter the last four digits of your Social Security Number (xxxx) and Date of Birth (mm/dd/yyyy) as indicated and click Submit. You will be taken to the next sign-up page. 5. Create a [employer] ID.  This will be your [employer] login ID and cannot be changed, so think of one that is secure and easy to remember. 6. Create a [employer] password. You can change your password at any time. 7. Enter your Password Reset Question and Answer. This can be used at a later time if you forget your password. 8. Enter your e-mail address. You will receive e-mail notification when new information is available in [address] 9. Click Sign Up. You can now view and download portions of your medical record. 10. Click the Download Summary menu link to download a portable copy of your medical information. If you have questions, please visit the Frequently Asked Questions section of the [employer] website. Remember, [employer] is NOT to be used for urgent needs. For medical emergencies, dial 911. Now available from your iPhone and Android! Please provide this summary of care documentation to your next provider. Your primary care clinician is listed as Cassandra Little. If you have any questions after today's visit, please call [phone].

## 2017-11-27 NOTE — PROGRESS NOTES
Micky Vaz    Chief Complaint   Patient presents with    Blood Pressure Check    GERD    Blood sugar problem       Reviewed record In preparation for visit and have obtained necessary documentation. Has info on advanced directive but has not filled them out. 1. Have you been to the ER, urgent care clinic or hospitalized since your last visit? No     2. Have you seen or consulted any other health care providers outside of the [address] [place] [address] since your last visit? Include any pap smears or colon screening. DR Robert Manning, colonoscopy    Vitals reviewed with provider.     Health Maintenance reviewed:

## 2018-05-29 ENCOUNTER — OFFICE VISIT (OUTPATIENT)
Dept: INTERNAL MEDICINE CLINIC | Facility: CLINIC | Age: 58
End: 2018-05-29

## 2018-05-29 VITALS
DIASTOLIC BLOOD PRESSURE: 80 MMHG | BODY MASS INDEX: 27.13 KG/M2 | HEIGHT: 68 IN | RESPIRATION RATE: 20 BRPM | SYSTOLIC BLOOD PRESSURE: 142 MMHG | TEMPERATURE: 98.4 F | OXYGEN SATURATION: 96 % | HEART RATE: 82 BPM | WEIGHT: 179 LBS

## 2018-05-29 DIAGNOSIS — E78.2 HYPERLIPEMIA, MIXED: ICD-10-CM

## 2018-05-29 DIAGNOSIS — R73.02 GLUCOSE INTOLERANCE (IMPAIRED GLUCOSE TOLERANCE): ICD-10-CM

## 2018-05-29 DIAGNOSIS — F17.210 MODERATE CIGARETTE SMOKER (10-19 PER DAY): ICD-10-CM

## 2018-05-29 DIAGNOSIS — Z91.14 NONCOMPLIANCE WITH MEDICATION REGIMEN: ICD-10-CM

## 2018-05-29 DIAGNOSIS — K21.9 GASTROESOPHAGEAL REFLUX DISEASE WITHOUT ESOPHAGITIS: ICD-10-CM

## 2018-05-29 DIAGNOSIS — M17.32 POST-TRAUMATIC OSTEOARTHRITIS OF LEFT KNEE: ICD-10-CM

## 2018-05-29 DIAGNOSIS — Z13.31 SCREENING FOR DEPRESSION: ICD-10-CM

## 2018-05-29 DIAGNOSIS — I10 HYPERTENSION, ESSENTIAL: Primary | ICD-10-CM

## 2018-05-29 DIAGNOSIS — J44.9 CHRONIC OBSTRUCTIVE PULMONARY DISEASE, UNSPECIFIED COPD TYPE (HCC): ICD-10-CM

## 2018-05-29 RX ORDER — LISINOPRIL AND HYDROCHLOROTHIAZIDE 12.5; 2 MG/1; MG/1
2 TABLET ORAL DAILY
Qty: 180 TAB | Refills: 1 | Status: SHIPPED | OUTPATIENT
Start: 2018-05-29 | End: 2019-02-07 | Stop reason: SDUPTHER

## 2018-05-29 RX ORDER — ACETAMINOPHEN 500 MG
TABLET ORAL
COMMUNITY

## 2018-05-29 NOTE — PROGRESS NOTES
HISTORY OF PRESENT ILLNESS  Kandi Garnett is a 61 y.o. male. HPI He presents for follow up of hypertension, hyperlipidemia and glucose intolerance. He did not get pre-visit lab done. Diet and Lifestyle: generally follows a low fat low cholesterol diet, generally follows a low sodium diet, follows a diabetic diet regularly, sedentary, smoker 1 ppd  Medication compliance: not taking lisinopril/hydrochlorothiazide as he had diarrhea (which resolved when stopped duloxetine). Still did not call us or retry medication. Medication side effects: none  Home BP Monitoring: not done  Cardiovascular ROS:  He complains of lower extremity edema. He denies claudication palpitations, orthopnea, exertional chest pressure/discomfort, dyspnea on exertion, dizziness     He presents for follow up of gastroesophageal reflux. He is currently treating this with omeprazole. He has tried H2 blockers with no relief of symptoms. He denies dysphagia, has not lost weight, denies heartburn. Awakened at night by leg cramps. Cannot get back to sleep. Feels tired during the day. Continues to ask for something for pain in left knee and in legs. Says he will find another doctor if necessary to get pain medication. Had compound fracture of tibia and 2 surgeries in 2016. Knee arthritis preceded most recent injury, but there was a prior injury as well. corttisone injections did not help. He is being seen for follow up of COPD, not currently in exacerbation. taking medications as instructed, no medication side effects noted, no significant ongoing wheezing or shortness of breath, using bronchodilator MDI less than twice a week. Uses rescue inhaler:0  times /week  Smokin ppd.      PHQ over the last two weeks 2018   Little interest or pleasure in doing things Not at all   Feeling down, depressed or hopeless Not at all   Total Score PHQ 2 0   Trouble falling or staying asleep, or sleeping too much More than half the days Feeling tired or having little energy More than half the days   Poor appetite or overeating Not at all   Feeling bad about yourself - or that you are a failure or have let yourself or your family down Not at all   Trouble concentrating on things such as school, work, reading or watching TV Not at all   Moving or speaking so slowly that other people could have noticed; or the opposite being so fidgety that others notice Not at all   Thoughts of being better off dead, or hurting yourself in some way Not at all   PHQ 9 Score 4   How difficult have these problems made it for you to do your work, take care of your home and get along with others Not difficult at all       Patient Active Problem List   Diagnosis Code    Adenoma of right adrenal gland D35.01    Pulmonary nodules/lesions, multiple R91.8    GERD (gastroesophageal reflux disease) K21.9    Post-traumatic osteoarthritis of left knee M17.32    COPD (chronic obstructive pulmonary disease) ([facility] J44.9    Depression with anxiety F41.8    Hyperlipemia, mixed E78.2    Hypertension, essential I10    Noncompliance with medication regimen Z91.14    Glucose intolerance (impaired glucose tolerance) R73.02    Moderate cigarette smoker (10-19 per day) F17.210     Past Medical History:   Diagnosis Date    Adrenal mass, right ([facility] 2014    Seen on abd CT    CAD (coronary artery disease)     calcific atherosclerosis on CT    Depression     Hypercholesterolemia     Hypertension     Multiple lung nodules      Past Surgical History:   Procedure Laterality Date    HX ORTHOPAEDIC      hand    HX ORTHOPAEDIC      foot     Social History     Social History    Marital status: SINGLE     Spouse name: N/A    Number of children: N/A    Years of education: N/A     Social History Main Topics    Smoking status: Current Every Day Smoker     Packs/day: 1.00     Types: Cigarettes    Smokeless tobacco: Never Used    Alcohol use No      Comment: stopped jan 2014    Drug use: No    Sexual activity: Not Asked     Other Topics Concern    None     Social History Narrative     Family History   Problem Relation Age of Onset    Heart Disease Mother     Asthma Mother     Elevated Lipids Mother     Hypertension Mother      Allergies   Allergen Reactions    Advil [Ibuprofen] Hives    Duloxetine Diarrhea    Fluoxetine Diarrhea    Lovastatin Diarrhea    Tylenol [Acetaminophen] Hives     Current Outpatient Prescriptions   Medication Sig Dispense Refill    acetaminophen (TYLENOL EXTRA STRENGTH) 500 mg tablet Take  by mouth every six (6) hours as needed for Pain.  omeprazole (PRILOSEC) 20 mg capsule Take one po 30 minutes before breakfast with water. 30 Cap 0    calcium carbonate (TUMS) 200 mg calcium (500 mg) chew Take 1 Tab by mouth daily.  lisinopril-hydroCHLOROthiazide (PRINZIDE, ZESTORETIC) 20-12.5 mg per tablet Take 2 Tabs by mouth daily. 180 Tab 1         Review of Systems   Constitutional: Positive for malaise/fatigue. Negative for weight loss. Gastrointestinal: Negative for constipation, diarrhea and heartburn. Musculoskeletal: Positive for joint pain (left leg). Negative for back pain. Neurological: Positive for tingling (and burning in left foot. Since 2016 injury) and focal weakness (left leg  Since 2016 injury). Negative for dizziness. Visit Vitals    /80 (BP 1 Location: Left arm, BP Patient Position: Sitting)    Pulse 82    Temp 98.4 °F (36.9 °C) (Oral)    Resp 20    Ht 5' 8\" (1.727 m)    Wt 179 lb (81.2 kg)    SpO2 96%    BMI 27.22 kg/m2     Physical Exam   Constitutional: He is oriented to person, place, and time. He appears well-developed and well-nourished. HENT:   Head: Normocephalic and atraumatic. Eyes: Conjunctivae are normal. Pupils are equal, round, and reactive to light. Neck: Neck supple. Carotid bruit is not present. No thyromegaly present.    Cardiovascular: Normal rate, regular rhythm and normal heart sounds. PMI is not displaced. Exam reveals no gallop. No murmur heard. Pulses:       Dorsalis pedis pulses are 1+ on the right side, and 1+ on the left side. Posterior tibial pulses are 1+ on the right side, and 1+ on the left side. Pulmonary/Chest: Effort normal. He has no wheezes. He has no rhonchi. He has no rales. Abdominal: Soft. Normal appearance. He exhibits no abdominal bruit and no mass. There is no hepatosplenomegaly. There is no tenderness. Musculoskeletal: He exhibits no edema. Lymphadenopathy:     He has no cervical adenopathy. Right: No supraclavicular adenopathy present. Left: No supraclavicular adenopathy present. Neurological: He is alert and oriented to person, place, and time. No sensory deficit. Skin: Skin is warm, dry and intact. No rash noted. Psychiatric: He has a normal mood and affect. His behavior is normal.   Nursing note and vitals reviewed. Results for orders placed or performed in visit on 11/27/17   AMB POC HEMOGLOBIN A1C   Result Value Ref Range    Hemoglobin A1c (POC) 5.3 4.8 - 5.6 %     Lab Results   Component Value Date/Time    Cholesterol, total 302 (H) 04/18/2017 10:04 AM    HDL Cholesterol 46 04/18/2017 10:04 AM    LDL, calculated 227 (H) 04/18/2017 10:04 AM    VLDL, calculated 29 04/18/2017 10:04 AM    Triglyceride 144 04/18/2017 10:04 AM     Lab Results   Component Value Date/Time    Sodium 137 04/18/2017 10:04 AM    Potassium 4.5 04/18/2017 10:04 AM    Chloride 98 04/18/2017 10:04 AM    CO2 21 04/18/2017 10:04 AM    Anion gap 9 01/14/2014 11:35 AM    Glucose 103 (H) 04/18/2017 10:04 AM    BUN 17 04/18/2017 10:04 AM    Creatinine 0.67 (L) 04/18/2017 10:04 AM    BUN/Creatinine ratio 25 (H) 04/18/2017 10:04 AM    GFR est  04/18/2017 10:04 AM    GFR est non- 04/18/2017 10:04 AM    Calcium 9.8 04/18/2017 10:04 AM    Bilirubin, total 0.5 04/18/2017 10:04 AM    AST (SGOT) 18 04/18/2017 10:04 AM    Alk.  phosphatase 123 (H) 04/18/2017 10:04 AM    Protein, total 6.9 04/18/2017 10:04 AM    Albumin 4.8 04/18/2017 10:04 AM    Globulin 3.6 01/14/2014 11:35 AM    A-G Ratio 2.3 (H) 04/18/2017 10:04 AM    ALT (SGPT) 28 04/18/2017 10:04 AM       ASSESSMENT and PLAN    ICD-10-CM ICD-9-CM    1. Hypertension, essential I10 401.9 lisinopril-hydroCHLOROthiazide (PRINZIDE, ZESTORETIC) 20-12.5 mg per tablet   2. Noncompliance with medication regimen Z91.14 V15.81    3. Hyperlipemia, mixed E78.2 272.2 LDL, DIRECT      METABOLIC PANEL, COMPREHENSIVE      CANCELED: METABOLIC PANEL, COMPREHENSIVE   4. Chronic obstructive pulmonary disease, unspecified COPD type ([facility] J44.9 496    5. Gastroesophageal reflux disease without esophagitis K21.9 530.81    6. Post-traumatic osteoarthritis of left knee M17.32 715.26    7. Glucose intolerance (impaired glucose tolerance) R73.02 790.22    8. Moderate cigarette smoker (10-19 per day) F17.210 305.1      Diagnoses and all orders for this visit:    1. Hypertension, essential  -    Restart lisinopril-hydroCHLOROthiazide (PRINZIDE, ZESTORETIC) 20-12.5 mg per tablet; Take 2 Tabs by mouth daily. 2. Noncompliance with medication regimen  Great concern. His main focus is on getting pain medication, which I have declined in past. Discussed damage that uncontrolled BP and cholesterol can cause. 3. Hyperlipemia, mixed  Hyperlipidemia is uncontrolled - medication changes/orders  -     LDL, DIRECT; Future  -     METABOLIC PANEL, COMPREHENSIVE; Future    4. Chronic obstructive pulmonary disease, unspecified COPD type ([facility]  Stable, but continues to smoke. 5. Gastroesophageal reflux disease without esophagitis  Controlled with medication and diet. 6. Post-traumatic osteoarthritis of left knee  He is unreliable and I am not comfortable with prescribing controlled substance.  He is told he is certainly free to change physician if he feels that is in his best interest.     7. Glucose intolerance (impaired glucose tolerance)  Stable. Most recent A1c normal.     8. Moderate cigarette smoker (10-19 per day)  Encouraged to stop smoking to decrease risk of cancer, pulmonary, and cardiovascular disease. Follow-up Disposition:  Return in about 1 month (around 6/29/2018) for HTN, non-fasting lab one week prior . lab results and schedule of future lab studies reviewed with patient  reviewed diet, exercise and weight control  cardiovascular risk and specific lipid/LDL goals reviewed  I have discussed the diagnosis, evaluation and treatment options and the intended plan with the patient. Patient is in agreement. The patient has received an after-visit summary and questions were answered concerning future plans. I have discussed side effects and warnings of any new medications with the patient as well.

## 2018-05-29 NOTE — PATIENT INSTRUCTIONS
Restart lisinopril/hydrochlororthiszide  Return in one month for blood pressure. Non-fasting lab one week prior          Stopping Smoking: Care Instructions  Your Care Instructions    Cigarette smokers crave the nicotine in cigarettes. Giving it up is much harder than simply changing a habit. Your body has to stop craving the nicotine. It is hard to quit, but you can do it. There are many tools that people use to quit smoking. You may find that combining tools works best for you. There are several steps to quitting. First you get ready to quit. Then you get support to help you. After that, you learn new skills and behaviors to become a nonsmoker. For many people, a necessary step is getting and using medicine. Your doctor will help you set up the plan that best meets your needs. You may want to attend a smoking cessation program to help you quit smoking. When you choose a program, look for one that has proven success. Ask your doctor for ideas. You will greatly increase your chances of success if you take medicine as well as get counseling or join a cessation program.  Some of the changes you feel when you first quit tobacco are uncomfortable. Your body will miss the nicotine at first, and you may feel short-tempered and grumpy. You may have trouble sleeping or concentrating. Medicine can help you deal with these symptoms. You may struggle with changing your smoking habits and rituals. The last step is the tricky one: Be prepared for the smoking urge to continue for a time. This is a lot to deal with, but keep at it. You will feel better. Follow-up care is a key part of your treatment and safety. Be sure to make and go to all appointments, and call your doctor if you are having problems. It's also a good idea to know your test results and keep a list of the medicines you take. How can you care for yourself at home? · Ask your family, friends, and coworkers for support.  You have a better chance of quitting if you have help and support. · Join a support group, such as Nicotine Anonymous, for people who are trying to quit smoking. · Consider signing up for a smoking cessation program, such as the American [employer] Association's Freedom from Smoking program.  · Set a quit date. Pick your date carefully so that it is not right in the middle of a big deadline or stressful time. Once you quit, do not even take a puff. Get rid of all ashtrays and lighters after your last cigarette. Clean your house and your clothes so that they do not smell of smoke. · Learn how to be a nonsmoker. Think about ways you can avoid those things that make you reach for a cigarette. ¨ Avoid situations that put you at greatest risk for smoking. For some people, it is hard to have a drink with friends without smoking. For others, they might skip a coffee break with coworkers who smoke. ¨ Change your daily routine. Take a different route to work or eat a meal in a different place. · Cut down on stress. Calm yourself or release tension by doing an activity you enjoy, such as reading a book, taking a hot bath, or gardening. · Talk to your doctor or pharmacist about nicotine replacement therapy, which replaces the nicotine in your body. You still get nicotine but you do not use tobacco. Nicotine replacement products help you slowly reduce the amount of nicotine you need. These products come in several forms, many of them available over-the-counter:  ¨ Nicotine patches  ¨ Nicotine gum and lozenges  ¨ Nicotine inhaler  · Ask your doctor about bupropion (Wellbutrin) or varenicline (Chantix), which are prescription medicines. They do not contain nicotine. They help you by reducing withdrawal symptoms, such as stress and anxiety. · Some people find hypnosis, acupuncture, and massage helpful for ending the smoking habit. · Eat a healthy diet and get regular exercise. Having healthy habits will help your body move past its craving for nicotine.   · Be prepared to keep trying. Most people are not successful the first few times they try to quit. Do not get mad at yourself if you smoke again. Make a list of things you learned and think about when you want to try again, such as next week, next month, or next year. Where can you learn more? Go to http://nehemiah-nadia.info/. Enter W686 in the search box to learn more about \"Stopping Smoking: Care Instructions. \"  Current as of: March 20, 2017  Content Version: 11.4  © 5325-3767 [employer]. Care instructions adapted under license by [employer] (which disclaims liability or warranty for this information). If you have questions about a medical condition or this instruction, always ask your healthcare professional. [address] any warranty or liability for your use of this information.

## 2018-05-29 NOTE — MR AVS SNAPSHOT
[address] [address] [phone] Patient: Imtiaz Vance MRN: [id number] WANDA:[id number] Visit Information Date & Time Provider Department Dept. Phone Encounter #  
 5/29/2018  3:30 PM Paradise Matos MD [facility] Internal Medicine of [address] [place] [address] [id number] Follow-up Instructions Return in about 1 month (around 6/29/2018) for HTN, non-fasting lab one week prior . Upcoming Health Maintenance Date Due Influenza Age 5 to Adult 8/1/2018 DTaP/Tdap/Td series (2 - Td) 4/18/2026 COLONOSCOPY 10/19/2027 Allergies as of 5/29/2018  Review Complete On: 5/29/2018 By: Paradise Matos MD  
  
 Severity Noted Reaction Type Reactions Advil [Ibuprofen]  05/30/2011    Hives Duloxetine  05/29/2018   Side Effect Diarrhea Fluoxetine  04/18/2017    Diarrhea Lovastatin  01/12/2017    Diarrhea Tylenol [Acetaminophen]  05/30/2011    Hives Current Immunizations  Reviewed on 5/29/2018 Name Date Influenza Vaccine (Quad) PF 8/31/2017, 9/20/2016 Pneumococcal Polysaccharide (PPSV-23) 11/11/2016 Tdap 4/18/2016 Reviewed by León Blue LPN on 8/59/4579 at  3:36 PM  
You Were Diagnosed With   
  
 Codes Comments Hypertension, essential    -  Primary ICD-10-CM: I10 
ICD-9-CM: 401.9 Hyperlipemia, mixed     ICD-10-CM: E78.2 ICD-9-CM: 272.2 Chronic obstructive pulmonary disease, unspecified COPD type ([facility]     ICD-10-CM: J44.9 ICD-9-CM: 102 Glucose intolerance (impaired glucose tolerance)     ICD-10-CM: R73.02 
ICD-9-CM: 790.22 Moderate cigarette smoker (10-19 per day)     ICD-10-CM: F17.210 ICD-9-CM: 305.1 Noncompliance with medication regimen     ICD-10-CM: Z91.14 
ICD-9-CM: V15.81 Gastroesophageal reflux disease without esophagitis     ICD-10-CM: K21.9 ICD-9-CM: 530.81  Essential hypertension with goal blood pressure less than 140/90 ICD-10-CM: I10 
ICD-9-CM: 401.9 Vitals BP Pulse Temp Resp Height(growth percentile) Weight(growth percentile) 142/80 (BP 1 Location: Left arm, BP Patient Position: Sitting) 82 98.4 °F (36.9 °C) (Oral) 20 5' 8\" (1.727 m) 179 lb (81.2 kg) SpO2 BMI Smoking Status 96% 27.22 kg/m2 Current Every Day Smoker Vitals History BMI and BSA Data Body Mass Index Body Surface Area  
 27.22 kg/m 2 1.97 m 2 Preferred Pharmacy Pharmacy Name Phone [facility] [address] [place] [address], Mayte Dickerson Numbers [phone] Your Updated Medication List  
  
   
This list is accurate as of 5/29/18  4:36 PM.  Always use your most recent med list.  
  
  
  
  
 calcium carbonate 200 mg calcium (500 mg) Linda Waddell Commonly known as:  TUMS Take 1 Tab by mouth daily. lisinopril-hydroCHLOROthiazide 20-12.5 mg per tablet Commonly known as:  James Thomas Take 2 Tabs by mouth daily. omeprazole 20 mg capsule Commonly known as:  PRILOSEC Take one po 30 minutes before breakfast with water. TYLENOL EXTRA STRENGTH 500 mg tablet Generic drug:  acetaminophen Take  by mouth every six (6) hours as needed for Pain. Prescriptions Sent to Pharmacy Refills  
 lisinopril-hydroCHLOROthiazide (PRINZIDE, ZESTORETIC) 20-12.5 mg per tablet 1 Sig: Take 2 Tabs by mouth daily. Class: Normal  
 Pharmacy: [address] [place] [address], [address] [place] [address] Ph #: [phone] Route: Oral  
  
Follow-up Instructions Return in about 1 month (around 6/29/2018) for HTN, non-fasting lab one week prior . To-Do List   
 06/29/2018 Lab:  LDL, DIRECT   
  
 06/29/2018 Lab:  METABOLIC PANEL, COMPREHENSIVE Patient Instructions Restart lisinopril/hydrochlororthiszide Return in one month for blood pressure. Non-fasting lab one week prior Stopping Smoking: Care Instructions Your Care Instructions Cigarette smokers crave the nicotine in cigarettes. Giving it up is much harder than simply changing a habit. Your body has to stop craving the nicotine. It is hard to quit, but you can do it. There are many tools that people use to quit smoking. You may find that combining tools works best for you. There are several steps to quitting. First you get ready to quit. Then you get support to help you. After that, you learn new skills and behaviors to become a nonsmoker. For many people, a necessary step is getting and using medicine. Your doctor will help you set up the plan that best meets your needs. You may want to attend a smoking cessation program to help you quit smoking. When you choose a program, look for one that has proven success. Ask your doctor for ideas. You will greatly increase your chances of success if you take medicine as well as get counseling or join a cessation program. 
Some of the changes you feel when you first quit tobacco are uncomfortable. Your body will miss the nicotine at first, and you may feel short-tempered and grumpy. You may have trouble sleeping or concentrating. Medicine can help you deal with these symptoms. You may struggle with changing your smoking habits and rituals. The last step is the tricky one: Be prepared for the smoking urge to continue for a time. This is a lot to deal with, but keep at it. You will feel better. Follow-up care is a key part of your treatment and safety. Be sure to make and go to all appointments, and call your doctor if you are having problems. It's also a good idea to know your test results and keep a list of the medicines you take. How can you care for yourself at home? · Ask your family, friends, and coworkers for support. You have a better chance of quitting if you have help and support. · Join a support group, such as Nicotine Anonymous, for people who are trying to quit smoking. · Consider signing up for a smoking cessation program, such as the American Lung Association's Freedom from Smoking program. 
· Set a quit date. Pick your date carefully so that it is not right in the middle of a big deadline or stressful time. Once you quit, do not even take a puff. Get rid of all ashtrays and lighters after your last cigarette. Clean your house and your clothes so that they do not smell of smoke. · Learn how to be a nonsmoker. Think about ways you can avoid those things that make you reach for a cigarette. ¨ Avoid situations that put you at greatest risk for smoking. For some people, it is hard to have a drink with friends without smoking. For others, they might skip a coffee break with coworkers who smoke. ¨ Change your daily routine. Take a different route to work or eat a meal in a different place. · Cut down on stress. Calm yourself or release tension by doing an activity you enjoy, such as reading a book, taking a hot bath, or gardening. · Talk to your doctor or pharmacist about nicotine replacement therapy, which replaces the nicotine in your body. You still get nicotine but you do not use tobacco. Nicotine replacement products help you slowly reduce the amount of nicotine you need. These products come in several forms, many of them available over-the-counter: ¨ Nicotine patches ¨ Nicotine gum and lozenges ¨ Nicotine inhaler · Ask your doctor about bupropion (Wellbutrin) or varenicline (Chantix), which are prescription medicines. They do not contain nicotine. They help you by reducing withdrawal symptoms, such as stress and anxiety. · Some people find hypnosis, acupuncture, and massage helpful for ending the smoking habit. · Eat a healthy diet and get regular exercise. Having healthy habits will help your body move past its craving for nicotine. · Be prepared to keep trying.  Most people are not successful the first few times they try to quit. Do not get mad at yourself if you smoke again. Make a list of things you learned and think about when you want to try again, such as next week, next month, or next year. Where can you learn more? Go to http://nehemiah-nadia.info/. Enter Z646 in the search box to learn more about \"Stopping Smoking: Care Instructions. \" Current as of: March 20, 2017 Content Version: 11.4 © 9009-3688 [employer]. Care instructions adapted under license by [employer] (which disclaims liability or warranty for this information). If you have questions about a medical condition or this instruction, always ask your healthcare professional. [address] any warranty or liability for your use of this information. Introducing [facility]! [employer] introduces [employer] patient portal. Now you can access parts of your medical record, email your doctor's office, and request medication refills online. 1. In your internet browser, go to [email or web address] 2. Click on the First Time User? Click Here link in the Sign In box. You will see the New Member Sign Up page. 3. Enter your [employer] Access Code exactly as it appears below. You will not need to use this code after youve completed the sign-up process. If you do not sign up before the expiration date, you must request a new code. · [employer] Access Code: [email or web address] Expires: 8/27/2018  4:36 PM 
 
4. Enter the last four digits of your Social Security Number (xxxx) and Date of Birth (mm/dd/yyyy) as indicated and click Submit. You will be taken to the next sign-up page. 5. Create a [employer] ID. This will be your [employer] login ID and cannot be changed, so think of one that is secure and easy to remember. 6. Create a [employer] password. You can change your password at any time. 7. Enter your Password Reset Question and Answer.  This can be used at a later time if you forget your password. 8. Enter your e-mail address. You will receive e-mail notification when new information is available in [address] 9. Click Sign Up. You can now view and download portions of your medical record. 10. Click the Download Summary menu link to download a portable copy of your medical information. If you have questions, please visit the Frequently Asked Questions section of the [employer] website. Remember, [employer] is NOT to be used for urgent needs. For medical emergencies, dial 911. Now available from your iPhone and Android! Please provide this summary of care documentation to your next provider. Your primary care clinician is listed as Alex Pradhan. If you have any questions after today's visit, please call [phone].

## 2018-05-29 NOTE — PROGRESS NOTES
Mirna Caballero  Identified pt with two pt identifiers(name and ). Chief Complaint   Patient presents with    Blood Pressure Check    Cholesterol Problem    COPD       Reviewed record In preparation for visit and have obtained necessary documentation. Has info on advanced directive but has not filled them out. 1. Have you been to the ER, urgent care clinic or hospitalized since your last visit? No     2. Have you seen or consulted any other health care providers outside of the [facility] since your last visit? Include any pap smears or colon screening. Dr Jak Mcguire reviewed with provider. Health Maintenance reviewed: There are no preventive care reminders to display for this patient.        Wt Readings from Last 3 Encounters:   18 179 lb (81.2 kg)   17 192 lb (87.1 kg)   17 185 lb 8 oz (84.1 kg)        Temp Readings from Last 3 Encounters:   18 98.4 °F (36.9 °C) (Oral)   17 96.4 °F (35.8 °C) (Oral)   17 96.7 °F (35.9 °C) (Oral)        BP Readings from Last 3 Encounters:   18 154/89   17 134/74   17 130/82        Pulse Readings from Last 3 Encounters:   18 82   17 77   17 69        Vitals:    18 1540 18 1544   BP: 168/75 154/89   Pulse: 82    Resp: 20    Temp: 98.4 °F (36.9 °C)    TempSrc: Oral    SpO2: 96%    Weight: 179 lb (81.2 kg)    Height: 5' 8\" (1.727 m)    PainSc:   6    PainLoc: Leg           Learning Assessment:   :       Learning Assessment 2014   PRIMARY LEARNER Patient   HIGHEST LEVEL OF EDUCATION - PRIMARY LEARNER  DID NOT GRADUATE HIGH SCHOOL   BARRIERS PRIMARY LEARNER READING   CO-LEARNER CAREGIVER No   PRIMARY LANGUAGE ENGLISH   LEARNER PREFERENCE PRIMARY DEMONSTRATION   ANSWERED BY patient   RELATIONSHIP SELF        Depression Screening:   :       PHQ over the last two weeks 2018   Little interest or pleasure in doing things Not at all   Feeling down, depressed or hopeless Not at all   Total Score PHQ 2 0   Trouble falling or staying asleep, or sleeping too much More than half the days   Feeling tired or having little energy More than half the days   Poor appetite or overeating Not at all   Feeling bad about yourself - or that you are a failure or have let yourself or your family down Not at all   Trouble concentrating on things such as school, work, reading or watching TV Not at all   Moving or speaking so slowly that other people could have noticed; or the opposite being so fidgety that others notice Not at all   Thoughts of being better off dead, or hurting yourself in some way Not at all   PHQ 9 Score 4   How difficult have these problems made it for you to do your work, take care of your home and get along with others Not difficult at all        Fall Risk Assessment:   :     No flowsheet data found. Abuse Screening:   :     No flowsheet data found.      ADL Screening:   :       ADL Assessment 10/31/2014   Feeding yourself No Help Needed   Getting from bed to chair No Help Needed   Getting dressed No Help Needed   Bathing or showering No Help Needed   Walk across the room (includes cane/walker) No Help Needed   Using the telphone No Help Needed   Taking your medications No Help Needed   Preparing meals No Help Needed   Managing money (expenses/bills) No Help Needed   Moderately strenuous housework (laundry) No Help Needed   Shopping for personal items (toiletries/medicines) No Help Needed   Shopping for groceries No Help Needed   Driving Help Needed   Climbing a flight of stairs No Help Needed   Getting to places beyond walking distances No Help Needed

## 2018-10-25 ENCOUNTER — LAB ONLY (OUTPATIENT)
Dept: INTERNAL MEDICINE CLINIC | Facility: CLINIC | Age: 58
End: 2018-10-25

## 2018-10-25 DIAGNOSIS — E78.2 HYPERLIPEMIA, MIXED: ICD-10-CM

## 2018-10-26 LAB
ALBUMIN SERPL-MCNC: 5 G/DL (ref 3.5–5.5)
ALBUMIN/GLOB SERPL: 2.3 {RATIO} (ref 1.2–2.2)
ALP SERPL-CCNC: 102 IU/L (ref 39–117)
ALT SERPL-CCNC: 28 IU/L (ref 0–44)
AST SERPL-CCNC: 19 IU/L (ref 0–40)
BILIRUB SERPL-MCNC: 0.3 MG/DL (ref 0–1.2)
BUN SERPL-MCNC: 10 MG/DL (ref 6–24)
BUN/CREAT SERPL: 13 (ref 9–20)
CALCIUM SERPL-MCNC: 10.1 MG/DL (ref 8.7–10.2)
CHLORIDE SERPL-SCNC: 101 MMOL/L (ref 96–106)
CO2 SERPL-SCNC: 23 MMOL/L (ref 20–29)
CREAT SERPL-MCNC: 0.75 MG/DL (ref 0.76–1.27)
GLOBULIN SER CALC-MCNC: 2.2 G/DL (ref 1.5–4.5)
GLUCOSE SERPL-MCNC: 89 MG/DL (ref 65–99)
LDLC SERPL DIRECT ASSAY-MCNC: 181 MG/DL (ref 0–99)
POTASSIUM SERPL-SCNC: 4.7 MMOL/L (ref 3.5–5.2)
PROT SERPL-MCNC: 7.2 G/DL (ref 6–8.5)
SODIUM SERPL-SCNC: 141 MMOL/L (ref 134–144)

## 2018-10-31 NOTE — PROGRESS NOTES
HISTORY OF PRESENT ILLNESS  Veronica Ching is a 61 y.o. male. HPI  He presents for follow up of hypertension, hyperlipidemia and glucose intolerance. When seen on 5/29 he had BP of 154/89 and was advised to restart lisinopril/hydrochlorothiazide 20-12.5 mg which he had stopped taking. It was intended that he return on month later. .  Diet and Lifestyle: generally follows a low fat low cholesterol diet, generally follows a low sodium diet, follows a diabetic diet regularly, sedentary, smoker less than 1 ppd  Medication compliance: compliant all of the time  Medication side effects: none  Home BP Monitoring: not done  Cardiovascular ROS:  He complains of lower extremity edema (left leg), dyspnea on exertion. He denies palpitations, orthopnea, exertional chest pressure/discomfort, claudication, dizziness     He presents for follow up of gastroesophageal reflux. He is currently treating this with omeprazole. He has tried H2 blockers with inadequate relief of symptoms. Current symptoms include ocasional heartburn. .  He denies dysphagia, has not lost weight,     He is being seen for follow up of COPD. He is taking medications as instructed, no medication side effects noted, notes increased dyspnea with exertion. Hehas shortness of breath none, only with activity. Exercise tolerance is mild intolerance and energy level is decreased. He coughs up minimal clear sputum. Sputum production has N/A, baseline. Uses rescue inhaler: Does not have inhaler    He presents for follow up of anxiety. Current therapy includes no medication.   Ongoing symptoms include:  feelings of apprehension/worry and restlessness  Patient denies: fear of impending doom, irrational fears, panic attacks and poor concentration/attention         Patient Active Problem List   Diagnosis Code    Adenoma of right adrenal gland D35.01    Pulmonary nodules/lesions, multiple R91.8    GERD (gastroesophageal reflux disease) K21.9    Post-traumatic osteoarthritis of left knee M17.32    COPD (chronic obstructive pulmonary disease) ([facility]) J44.9    Depression with anxiety F41.8    Hyperlipemia, mixed E78.2    Hypertension, essential I10    Noncompliance with medication regimen Z91.14    Glucose intolerance (impaired glucose tolerance) R73.02    Moderate cigarette smoker (10-19 per day) F17.210     Past Medical History:   Diagnosis Date    Adrenal mass, right ([address] 2014    Seen on abd CT    CAD (coronary artery disease)     calcific atherosclerosis on CT    Depression     Hypercholesterolemia     Hypertension     Multiple lung nodules      Past Surgical History:   Procedure Laterality Date    HX ORTHOPAEDIC      hand    HX ORTHOPAEDIC      foot     Social History     Socioeconomic History    Marital status: SINGLE     Spouse name: Not on file    Number of children: Not on file    Years of education: Not on file    Highest education level: Not on file   Social Needs    Financial resource strain: Not on file    Food insecurity - worry: Not on file    Food insecurity - inability: Not on file   [employer] needs - medical: Not on file   [employer] needs - non-medical: Not on file   Occupational History    Not on file   Tobacco Use    Smoking status: Current Every Day Smoker     Packs/day: 1.00     Types: Cigarettes    Smokeless tobacco: Never Used   Substance and Sexual Activity    Alcohol use: No     Alcohol/week: 0.0 oz     Comment: stopped jan 2014    Drug use: No    Sexual activity: Not on file   Other Topics Concern    Not on file   Social History Narrative    Not on file     Family History   Problem Relation Age of Onset    Heart Disease Mother     Asthma Mother     Elevated Lipids Mother     Hypertension Mother      Allergies   Allergen Reactions    Advil [Ibuprofen] Hives    Duloxetine Diarrhea    Fluoxetine Diarrhea    Lovastatin Diarrhea    Tylenol [Acetaminophen] Hives     Current Outpatient Medications Medication Sig Dispense Refill    omeprazole (PRILOSEC) 40 mg capsule Take 40 mg by mouth two (2) times a day.  acetaminophen (TYLENOL EXTRA STRENGTH) 500 mg tablet Take  by mouth every six (6) hours as needed for Pain.  lisinopril-hydroCHLOROthiazide (PRINZIDE, ZESTORETIC) 20-12.5 mg per tablet Take 2 Tabs by mouth daily. 180 Tab 1    calcium carbonate (TUMS) 200 mg calcium (500 mg) chew Take 1 Tab by mouth daily.  omeprazole (PRILOSEC) 20 mg capsule Take one po 30 minutes before breakfast with water. 30 Cap 0         Review of Systems   Constitutional: Negative for malaise/fatigue and weight loss. Gastrointestinal: Negative for constipation and diarrhea. Musculoskeletal: Positive for joint pain (left knee and leg). Negative for back pain. Neurological: Negative for tingling and focal weakness. Visit Vitals  /68 (BP 1 Location: Right arm, BP Patient Position: Sitting)   Pulse 83   Temp 97.8 °F (36.6 °C) (Oral)   Resp 14   Ht 5' 8\" (1.727 m)   Wt 182 lb (82.6 kg)   SpO2 95%   BMI 27.67 kg/m²     Physical Exam   Constitutional: He is oriented to person, place, and time. He appears well-developed and well-nourished. HENT:   Head: Normocephalic and atraumatic. Eyes: Conjunctivae are normal. Pupils are equal, round, and reactive to light. Neck: Neck supple. Carotid bruit is not present. No thyromegaly present. Cardiovascular: Normal rate, regular rhythm and normal heart sounds. PMI is not displaced. Exam reveals no gallop. No murmur heard. Pulses:       Dorsalis pedis pulses are 2+ on the right side, and 2+ on the left side. Posterior tibial pulses are 2+ on the right side, and 2+ on the left side. Pulmonary/Chest: Effort normal. He has no wheezes. He has no rhonchi. He has no rales. Abdominal: Soft. Normal appearance. He exhibits no abdominal bruit and no mass. There is no hepatosplenomegaly. There is no tenderness. Musculoskeletal: He exhibits no edema. Lymphadenopathy:     He has no cervical adenopathy. Right: No supraclavicular adenopathy present. Left: No supraclavicular adenopathy present. Neurological: He is alert and oriented to person, place, and time. No sensory deficit. Skin: Skin is warm, dry and intact. No rash noted. Psychiatric: He has a normal mood and affect. His behavior is normal.   Nursing note and vitals reviewed. Results for orders placed or performed in visit on 11/01/18   AMB POC HEMOGLOBIN A1C   Result Value Ref Range    Hemoglobin A1c (POC) 5.6 %     Lab Results   Component Value Date/Time    Hemoglobin A1c 5.7 (H) 04/18/2017 10:04 AM    Hemoglobin A1c (POC) 5.3 11/27/2017 10:00 AM       Results for orders placed or performed in visit on 73/60/38   METABOLIC PANEL, COMPREHENSIVE   Result Value Ref Range    Glucose 89 65 - 99 mg/dL    BUN 10 6 - 24 mg/dL    Creatinine 0.75 (L) 0.76 - 1.27 mg/dL    GFR est non- >59 mL/min/1.73    GFR est  >59 mL/min/1.73    BUN/Creatinine ratio 13 9 - 20    Sodium 141 134 - 144 mmol/L    Potassium 4.7 3.5 - 5.2 mmol/L    Chloride 101 96 - 106 mmol/L    CO2 23 20 - 29 mmol/L    Calcium 10.1 8.7 - 10.2 mg/dL    Protein, total 7.2 6.0 - 8.5 g/dL    Albumin 5.0 3.5 - 5.5 g/dL    GLOBULIN, TOTAL 2.2 1.5 - 4.5 g/dL    A-G Ratio 2.3 (H) 1.2 - 2.2    Bilirubin, total 0.3 0.0 - 1.2 mg/dL    Alk. phosphatase 102 39 - 117 IU/L    AST (SGOT) 19 0 - 40 IU/L    ALT (SGPT) 28 0 - 44 IU/L   LDL, DIRECT   Result Value Ref Range    LDL,Direct 181 (H) 0 - 99 mg/dL       ASSESSMENT and PLAN    ICD-10-CM ICD-9-CM    1. Hypertension, essential I10 401.9    2. Hyperlipemia, mixed E78.2 272.2 rosuvastatin (CRESTOR) 10 mg tablet      LIPID PANEL   3. Glucose intolerance (impaired glucose tolerance) R73.02 790.22 AMB POC HEMOGLOBIN A1C      CANCELED: HEMOGLOBIN A1C WITH EAG   4. Dyspnea on exertion R06.09 786.09 REFERRAL TO CARDIOLOGY   5.  Chronic obstructive pulmonary disease, unspecified COPD type ([facility] J44.9 496 albuterol (PROVENTIL HFA, VENTOLIN HFA, PROAIR HFA) 90 mcg/actuation inhaler      PFT COMPLETE   6. Gastroesophageal reflux disease without esophagitis K21.9 530.81 omeprazole (PRILOSEC) 40 mg capsule   7. Anxiety F41.9 300.00 busPIRone (BUSPAR) 5 mg tablet   8. Moderate cigarette smoker (10-19 per day) F17.210 305.1    9. Encounter for immunization Z23 V03.89 INFLUENZA VIRUS VAC QUAD,SPLIT,PRESV FREE SYRINGE IM      FL IMMUNIZ ADMIN,1 SINGLE/COMB VAC/TOXOID     Diagnoses and all orders for this visit:    1. Hypertension, essential  Hypertension is controlled    2. Hyperlipemia, mixed  Hyperlipidemia is uncontrolled - medication changes/orders  -     rosuvastatin (CRESTOR) 10 mg tablet; Take 1 Tab by mouth nightly. -     LIPID PANEL; Future    3. Glucose intolerance (impaired glucose tolerance)  A1c is now in normal range   -     AMB POC HEMOGLOBIN A1C    4. Dyspnea on exertion  Has many risk factors for CAD. Need to consider this might be angina equivalent.   -     REFERRAL TO CARDIOLOGY    5. Chronic obstructive pulmonary disease, unspecified COPD type (HCC)  -     albuterol (PROVENTIL HFA, VENTOLIN HFA, PROAIR HFA) 90 mcg/actuation inhaler; Take 1 Puff by inhalation every four (4) hours as needed for Wheezing.  -     PFT COMPLETE; Future    6. Gastroesophageal reflux disease without esophagitis  -     omeprazole (PRILOSEC) 40 mg capsule; Take 1 Cap by mouth two (2) times a day. 7. Anxiety  -     busPIRone (BUSPAR) 5 mg tablet; Take 1 Tab by mouth three (3) times daily (with meals). 8. Moderate cigarette smoker (10-19 per day)  Encouraged to stop smoking to decrease risk of cancer, pulmonary, and cardiovascular disease. 9. Encounter for immunization  -     INFLUENZA VIRUS VAC QUAD,SPLIT,PRESV FREE SYRINGE IM  -     FL IMMUNIZ ADMIN,1 SINGLE/COMB VAC/TOXOID      Follow-up Disposition:  Return in about 4 months (around 3/1/2019) for HTN, chol, gluc, COPD,   Fasting lab in 6 weeks. .  lab results and schedule of future lab studies reviewed with patient  reviewed diet, exercise and weight control  cardiovascular risk and specific lipid/LDL goals reviewed  I have discussed the diagnosis, evaluation and treatment options and the intended plan with the patient. Patient understands and is in agreement. The patient has received an after-visit summary and questions were answered concerning future plans. I have discussed side effects and warnings of any new medications with the patient as well.

## 2018-11-01 ENCOUNTER — OFFICE VISIT (OUTPATIENT)
Dept: INTERNAL MEDICINE CLINIC | Facility: CLINIC | Age: 58
End: 2018-11-01

## 2018-11-01 VITALS
WEIGHT: 182 LBS | SYSTOLIC BLOOD PRESSURE: 130 MMHG | DIASTOLIC BLOOD PRESSURE: 68 MMHG | TEMPERATURE: 97.8 F | BODY MASS INDEX: 27.58 KG/M2 | RESPIRATION RATE: 14 BRPM | HEIGHT: 68 IN | OXYGEN SATURATION: 95 % | HEART RATE: 83 BPM

## 2018-11-01 DIAGNOSIS — J44.9 CHRONIC OBSTRUCTIVE PULMONARY DISEASE, UNSPECIFIED COPD TYPE (HCC): ICD-10-CM

## 2018-11-01 DIAGNOSIS — Z23 ENCOUNTER FOR IMMUNIZATION: ICD-10-CM

## 2018-11-01 DIAGNOSIS — K21.9 GASTROESOPHAGEAL REFLUX DISEASE WITHOUT ESOPHAGITIS: ICD-10-CM

## 2018-11-01 DIAGNOSIS — E78.2 HYPERLIPEMIA, MIXED: ICD-10-CM

## 2018-11-01 DIAGNOSIS — R73.02 GLUCOSE INTOLERANCE (IMPAIRED GLUCOSE TOLERANCE): ICD-10-CM

## 2018-11-01 DIAGNOSIS — F17.210 MODERATE CIGARETTE SMOKER (10-19 PER DAY): ICD-10-CM

## 2018-11-01 DIAGNOSIS — F41.9 ANXIETY: ICD-10-CM

## 2018-11-01 DIAGNOSIS — I10 HYPERTENSION, ESSENTIAL: Primary | ICD-10-CM

## 2018-11-01 DIAGNOSIS — R06.09 DYSPNEA ON EXERTION: ICD-10-CM

## 2018-11-01 LAB — HBA1C MFR BLD HPLC: 5.6 %

## 2018-11-01 RX ORDER — ROSUVASTATIN CALCIUM 10 MG/1
10 TABLET, COATED ORAL
Qty: 30 TAB | Refills: 11 | Status: SHIPPED | OUTPATIENT
Start: 2018-11-01 | End: 2018-11-15

## 2018-11-01 RX ORDER — OMEPRAZOLE 40 MG/1
40 CAPSULE, DELAYED RELEASE ORAL 2 TIMES DAILY
Qty: 60 CAP | Refills: 11 | Status: SHIPPED | OUTPATIENT
Start: 2018-11-01 | End: 2019-02-07

## 2018-11-01 RX ORDER — OMEPRAZOLE 40 MG/1
40 CAPSULE, DELAYED RELEASE ORAL 2 TIMES DAILY
COMMUNITY
End: 2018-11-01 | Stop reason: SDUPTHER

## 2018-11-01 RX ORDER — ALBUTEROL SULFATE 90 UG/1
1 AEROSOL, METERED RESPIRATORY (INHALATION)
Qty: 1 INHALER | Refills: 2 | Status: SHIPPED | OUTPATIENT
Start: 2018-11-01 | End: 2021-08-16 | Stop reason: SDUPTHER

## 2018-11-01 RX ORDER — BUSPIRONE HYDROCHLORIDE 5 MG/1
5 TABLET ORAL
Qty: 90 TAB | Refills: 5 | Status: SHIPPED | OUTPATIENT
Start: 2018-11-01 | End: 2019-02-07

## 2018-11-01 NOTE — PATIENT INSTRUCTIONS
Start rosuvastatin 10 mg once a day for cholesterol  Fasting lab on about 6 weeks  Office visit in 6 months       Vaccine Information Statement    Influenza (Flu) Vaccine (Inactivated or Recombinant): What you need to know    Many Vaccine Information Statements are available in [place] and other languages. See www.immunize.org/vis  Hojas de Información Sobre Vacunas están disponibles en Español y en muchos otros idiomas. Visite www.immunize.org/vis    1. Why get vaccinated? Influenza (flu) is a contagious disease that spreads around the [facility] every year, usually between October and May. Flu is caused by influenza viruses, and is spread mainly by coughing, sneezing, and close contact. Anyone can get flu. Flu strikes suddenly and can last several days. Symptoms vary by age, but can include:   fever/chills   sore throat   muscle aches   fatigue   cough   headache    runny or stuffy nose    Flu can also lead to pneumonia and blood infections, and cause diarrhea and seizures in children. If you have a medical condition, such as heart or lung disease, flu can make it worse. Flu is more dangerous for some people. Infants and young children, people 72years of age and older, pregnant women, and people with certain health conditions or a weakened immune system are at greatest risk. Each year thousands of people in the [facility] die from flu, and many more are hospitalized. Flu vaccine can:   keep you from getting flu,   make flu less severe if you do get it, and   keep you from spreading flu to your family and other people. 2. Inactivated and recombinant flu vaccines    A dose of flu vaccine is recommended every flu season. Children 6 months through 6years of age may need two doses during the same flu season. Everyone else needs only one dose each flu season.        Some inactivated flu vaccines contain a very small amount of a mercury-based preservative called thimerosal. Studies have not shown thimerosal in vaccines to be harmful, but flu vaccines that do not contain thimerosal are available. There is no live flu virus in flu shots. They cannot cause the flu. There are many flu viruses, and they are always changing. Each year a new flu vaccine is made to protect against three or four viruses that are likely to cause disease in the upcoming flu season. But even when the vaccine doesnt exactly match these viruses, it may still provide some protection    Flu vaccine cannot prevent:   flu that is caused by a virus not covered by the vaccine, or   illnesses that look like flu but are not. It takes about 2 weeks for protection to develop after vaccination, and protection lasts through the flu season. 3. Some people should not get this vaccine    Tell the person who is giving you the vaccine:     If you have any severe, life-threatening allergies. If you ever had a life-threatening allergic reaction after a dose of flu vaccine, or have a severe allergy to any part of this vaccine, you may be advised not to get vaccinated. Most, but not all, types of flu vaccine contain a small amount of egg protein.  If you ever had Guillain-Barré Syndrome (also called GBS). Some people with a history of GBS should not get this vaccine. This should be discussed with your doctor.  If you are not feeling well. It is usually okay to get flu vaccine when you have a mild illness, but you might be asked to come back when you feel better. 4. Risks of a vaccine reaction    With any medicine, including vaccines, there is a chance of reactions. These are usually mild and go away on their own, but serious reactions are also possible. Most people who get a flu shot do not have any problems with it.      Minor problems following a flu shot include:    soreness, redness, or swelling where the shot was given     hoarseness   sore, red or itchy eyes   cough   fever   aches   headache   itching   fatigue  If these problems occur, they usually begin soon after the shot and last 1 or 2 days. More serious problems following a flu shot can include the following:     There may be a small increased risk of Guillain-Barré Syndrome (GBS) after inactivated flu vaccine. This risk has been estimated at 1 or 2 additional cases per million people vaccinated. This is much lower than the risk of severe complications from flu, which can be prevented by flu vaccine.  Young children who get the flu shot along with pneumococcal vaccine (PCV13) and/or DTaP vaccine at the same time might be slightly more likely to have a seizure caused by fever. Ask your doctor for more information. Tell your doctor if a child who is getting flu vaccine has ever had a seizure. Problems that could happen after any injected vaccine:      People sometimes faint after a medical procedure, including vaccination. Sitting or lying down for about 15 minutes can help prevent fainting, and injuries caused by a fall. Tell your doctor if you feel dizzy, or have vision changes or ringing in the ears.  Some people get severe pain in the shoulder and have difficulty moving the arm where a shot was given. This happens very rarely.  Any medication can cause a severe allergic reaction. Such reactions from a vaccine are very rare, estimated at about 1 in a million doses, and would happen within a few minutes to a few hours after the vaccination. As with any medicine, there is a very remote chance of a vaccine causing a serious injury or death. The safety of vaccines is always being monitored. For more information, visit: www.cdc.gov/vaccinesafety/    5. What if there is a serious reaction? What should I look for?  Look for anything that concerns you, such as signs of a severe allergic reaction, very high fever, or unusual behavior.     Signs of a severe allergic reaction can include hives, swelling of the face and throat, difficulty breathing, a fast heartbeat, dizziness, and weakness - usually within a few minutes to a few hours after the vaccination. What should I do?  If you think it is a severe allergic reaction or other emergency that cant wait, call 9-1-1 and get the person to the nearest hospital. Otherwise, call your doctor.  Reactions should be reported to the Vaccine Adverse Event Reporting System (VAERS). Your doctor should file this report, or you can do it yourself through  the VAERS web site at www.vaers. [facility].gov, or by calling 3-203.318.8099. VAERS does not give medical advice. 6. The National Vaccine Injury Compensation Program    The [facility] Vaccine Injury Compensation Program (VICP) is a federal program that was created to compensate people who may have been injured by certain vaccines. Persons who believe they may have been injured by a vaccine can learn about the program and about filing a claim by calling 7-208.303.9111 or visiting the [facility] website at www.[facility].gov/vaccinecompensation. There is a time limit to file a claim for compensation. 7. How can I learn more?  Ask your healthcare provider. He or she can give you the vaccine package insert or suggest other sources of information.  Call your local or state health department.  Contact the Centers for Disease Control and Prevention (CDC):  - Call 2-429.645.4220 (1-800-CDC-INFO) or  - Visit CDCs website at www.cdc.gov/flu    Vaccine Information Statement   Inactivated Influenza Vaccine   8/7/2015  [address] [id number]-[id number]    Department of Health and Human Services  Centers for Disease Control and Prevention    Office Use Only

## 2018-11-01 NOTE — PROGRESS NOTES
Nadia Martinez  Identified pt with two pt identifiers(name and ). Chief Complaint   Patient presents with    Blood Pressure Check    Blood sugar problem    Immunization/Injection       Reviewed record In preparation for visit and have obtained necessary documentation. Has info on advanced directive but has not filled them out. 1. Have you been to the ER, urgent care clinic or hospitalized since your last visit? No     2. Have you seen or consulted any other health care providers outside of the [address] [place] [address] since your last visit? Include any pap smears or colon screening. No    Vitals reviewed with provider. Health Maintenance reviewed: After verbal order read back of , patient received Flu Shot in left arm,  [address]  52674-806-08 Lot [id number] Exp 19. Patient tolerated procedure without complaints and received VIS.     Health Maintenance Due   Topic    Shingrix Vaccine Age 50> (1 of 2)          Wt Readings from Last 3 Encounters:   18 182 lb (82.6 kg)   18 179 lb (81.2 kg)   17 192 lb (87.1 kg)        Temp Readings from Last 3 Encounters:   18 97.8 °F (36.6 °C) (Oral)   18 98.4 °F (36.9 °C) (Oral)   17 96.4 °F (35.8 °C) (Oral)        BP Readings from Last 3 Encounters:   18 150/81   18 142/80   17 134/74        Pulse Readings from Last 3 Encounters:   18 83   18 82   17 77        Vitals:    18 0818   BP: 150/81   Pulse: 83   Resp: 14   Temp: 97.8 °F (36.6 °C)   TempSrc: Oral   SpO2: 95%   Weight: 182 lb (82.6 kg)   Height: 5' 8\" (1.727 m)   PainSc:   8   PainLoc: Leg          Learning Assessment:   :       Learning Assessment 2014   PRIMARY LEARNER Patient   HIGHEST LEVEL OF EDUCATION - PRIMARY LEARNER  DID NOT GRADUATE HIGH SCHOOL   BARRIERS PRIMARY LEARNER READING   CO-LEARNER CAREGIVER No   PRIMARY LANGUAGE ENGLISH   LEARNER PREFERENCE PRIMARY DEMONSTRATION   ANSWERED BY patient   RELATIONSHIP SELF        Depression Screening:   :       PHQ over the last two weeks 5/29/2018   Little interest or pleasure in doing things Not at all   Feeling down, depressed, irritable, or hopeless Not at all   Total Score PHQ 2 0   Trouble falling or staying asleep, or sleeping too much More than half the days   Feeling tired or having little energy More than half the days   Poor appetite, weight loss, or overeating Not at all   Feeling bad about yourself - or that you are a failure or have let yourself or your family down Not at all   Trouble concentrating on things such as school, work, reading, or watching TV Not at all   Moving or speaking so slowly that other people could have noticed; or the opposite being so fidgety that others notice Not at all   Thoughts of being better off dead, or hurting yourself in some way Not at all   PHQ 9 Score 4   How difficult have these problems made it for you to do your work, take care of your home and get along with others Not difficult at all        Fall Risk Assessment:   :     No flowsheet data found. Abuse Screening:   :     No flowsheet data found.      ADL Screening:   :       ADL Assessment 10/31/2014   Feeding yourself No Help Needed   Getting from bed to chair No Help Needed   Getting dressed No Help Needed   Bathing or showering No Help Needed   Walk across the room (includes cane/walker) No Help Needed   Using the telphone No Help Needed   Taking your medications No Help Needed   Preparing meals No Help Needed   Managing money (expenses/bills) No Help Needed   Moderately strenuous housework (laundry) No Help Needed   Shopping for personal items (toiletries/medicines) No Help Needed   Shopping for groceries No Help Needed   Driving Help Needed   Climbing a flight of stairs No Help Needed   Getting to places beyond walking distances No Help Needed

## 2018-11-14 ENCOUNTER — HOSPITAL ENCOUNTER (OUTPATIENT)
Dept: PULMONOLOGY | Age: 58
Discharge: HOME | End: 2018-11-14
Attending: INTERNAL MEDICINE
Payer: MEDICAID

## 2018-11-14 ENCOUNTER — HOSPITAL ENCOUNTER (EMERGENCY)
Age: 58
Discharge: STILL ADMITTED OR ADMINISTRATIVE | End: 2018-11-14
Attending: EMERGENCY MEDICINE
Payer: MEDICAID

## 2018-11-14 VITALS — OXYGEN SATURATION: 98 %

## 2018-11-14 DIAGNOSIS — J44.9 CHRONIC OBSTRUCTIVE PULMONARY DISEASE, UNSPECIFIED COPD TYPE (HCC): ICD-10-CM

## 2018-11-14 PROCEDURE — 94729 DIFFUSING CAPACITY: CPT

## 2018-11-14 PROCEDURE — 74011000250 HC RX REV CODE- 250

## 2018-11-14 PROCEDURE — 94726 PLETHYSMOGRAPHY LUNG VOLUMES: CPT

## 2018-11-14 PROCEDURE — 94060 EVALUATION OF WHEEZING: CPT

## 2018-11-14 PROCEDURE — 75810000275 HC EMERGENCY DEPT VISIT NO LEVEL OF CARE

## 2018-11-14 RX ORDER — ALBUTEROL SULFATE 0.83 MG/ML
SOLUTION RESPIRATORY (INHALATION)
Status: DISPENSED
Start: 2018-11-14 | End: 2018-11-14

## 2018-11-14 RX ORDER — ALBUTEROL SULFATE 0.83 MG/ML
2.5 SOLUTION RESPIRATORY (INHALATION)
Status: COMPLETED | OUTPATIENT
Start: 2018-11-14 | End: 2018-11-14

## 2018-11-14 RX ADMIN — ALBUTEROL SULFATE 2.5 MG: 0.83 SOLUTION RESPIRATORY (INHALATION) at 08:55

## 2018-11-15 ENCOUNTER — OFFICE VISIT (OUTPATIENT)
Dept: CARDIOLOGY CLINIC | Age: 58
End: 2018-11-15

## 2018-11-15 VITALS
BODY MASS INDEX: 27.57 KG/M2 | WEIGHT: 181.9 LBS | DIASTOLIC BLOOD PRESSURE: 82 MMHG | RESPIRATION RATE: 18 BRPM | OXYGEN SATURATION: 95 % | HEIGHT: 68 IN | HEART RATE: 79 BPM | SYSTOLIC BLOOD PRESSURE: 148 MMHG

## 2018-11-15 DIAGNOSIS — E78.2 HYPERLIPEMIA, MIXED: Primary | ICD-10-CM

## 2018-11-15 DIAGNOSIS — Z91.14 NONCOMPLIANCE WITH MEDICATION REGIMEN: ICD-10-CM

## 2018-11-15 DIAGNOSIS — J44.9 CHRONIC OBSTRUCTIVE PULMONARY DISEASE, UNSPECIFIED COPD TYPE (HCC): ICD-10-CM

## 2018-11-15 DIAGNOSIS — F17.210 MODERATE CIGARETTE SMOKER (10-19 PER DAY): ICD-10-CM

## 2018-11-15 DIAGNOSIS — I10 HYPERTENSION, ESSENTIAL: ICD-10-CM

## 2018-11-15 DIAGNOSIS — Z72.0 TOBACCO USE: ICD-10-CM

## 2018-11-15 RX ORDER — DULOXETIN HYDROCHLORIDE 60 MG/1
60 CAPSULE, DELAYED RELEASE ORAL DAILY
COMMUNITY
Start: 2017-11-27 | End: 2019-02-07 | Stop reason: SDUPTHER

## 2018-11-15 RX ORDER — METOPROLOL SUCCINATE 25 MG/1
25 TABLET, EXTENDED RELEASE ORAL
Qty: 30 TAB | Refills: 1 | Status: SHIPPED | OUTPATIENT
Start: 2018-11-15 | End: 2019-02-07 | Stop reason: SDUPTHER

## 2018-11-15 RX ORDER — NITROGLYCERIN 0.4 MG/1
0.4 TABLET SUBLINGUAL
Qty: 1 BOTTLE | Refills: 1 | Status: SHIPPED | OUTPATIENT
Start: 2018-11-15 | End: 2019-11-26 | Stop reason: SDUPTHER

## 2018-11-15 RX ORDER — DULOXETIN HYDROCHLORIDE 30 MG/1
30 CAPSULE, DELAYED RELEASE ORAL DAILY
Status: ON HOLD | COMMUNITY
Start: 2017-11-27 | End: 2019-01-23

## 2018-11-15 RX ORDER — ASPIRIN 81 MG/1
81 TABLET ORAL DAILY
Qty: 100 TAB | Refills: 1 | Status: SHIPPED | OUTPATIENT
Start: 2018-11-15

## 2018-11-15 RX ORDER — ISOSORBIDE MONONITRATE 30 MG/1
15 TABLET, EXTENDED RELEASE ORAL DAILY
Qty: 30 TAB | Refills: 1 | Status: SHIPPED | OUTPATIENT
Start: 2018-11-15 | End: 2019-01-07 | Stop reason: SDUPTHER

## 2018-11-15 RX ORDER — ROSUVASTATIN CALCIUM 40 MG/1
40 TABLET, COATED ORAL
Qty: 90 TAB | Refills: 3 | Status: SHIPPED | OUTPATIENT
Start: 2018-11-15 | End: 2019-02-07 | Stop reason: SDUPTHER

## 2018-11-15 NOTE — PROGRESS NOTES
1. Have you been to the ER, urgent care clinic since your last visit? Hospitalized since your last visit? No    2. Have you seen or consulted any other health care providers outside of the [address] [place] [address] since your last visit? Include any pap smears or colon screening.  No    Chief Complaint   Patient presents with   [facility]     new patient; pt referred by pcp for GARCIA    Shortness of Breath    Chest Pain     pt c/o intermittent achy pain to chest; onset 2-3 months ago; pt states inhalers help    Tingling     pt c/o intermittent tingling sensation to fingers both hands; denies any numbness

## 2018-11-15 NOTE — PROGRESS NOTES
Alis Weller DNP, ANP-BC  Subjective/HPI:     Qasim Oneal is a 61 y.o. male is here for new patient consultation regarding episodes of anterior chest pressure progressive over the last 2-3 months with significant amounts of dyspnea on exertion and fatigue. Cardiac risk factors include long-term smoker, marked hyperlipidemia most likely representing familial hyperlipidemia, strong family history of heart disease, hypertension. In chart review previous CT of abdomen was noted to have significant amount of atherosclerosis. 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[facility]) 2014    Seen on abd CT    CAD (coronary artery disease)     calcific atherosclerosis on CT    Depression     Hypercholesterolemia     Hypertension     Multiple lung nodules       Past Surgical History:   Procedure Laterality Date    HX ORTHOPAEDIC      hand    HX ORTHOPAEDIC      foot     Allergies   Allergen Reactions    Advil [Ibuprofen] Hives    Duloxetine Diarrhea    Fluoxetine Diarrhea    Lovastatin Diarrhea    Tylenol [Acetaminophen] Hives      Family History   Problem Relation Age of Onset    Heart Disease Mother     Asthma Mother     Elevated Lipids Mother     Hypertension Mother       Current Outpatient Medications   Medication Sig    DULoxetine (CYMBALTA) 30 mg capsule Take 30 mg by mouth.  metoprolol succinate (TOPROL-XL) 25 mg XL tablet Take 1 Tab by mouth nightly.  rosuvastatin (CRESTOR) 40 mg tablet Take 1 Tab by mouth nightly.  isosorbide mononitrate ER (IMDUR) 30 mg tablet Take 0.5 Tabs by mouth daily.  aspirin delayed-release 81 mg tablet Take 1 Tab by mouth daily.  nitroglycerin (NITROSTAT) 0.4 mg SL tablet 1 Tab by SubLINGual route every five (5) minutes as needed for Chest Pain. Up to 3 doses.  omeprazole (PRILOSEC) 40 mg capsule Take 1 Cap by mouth two (2) times a day.     albuterol (PROVENTIL HFA, VENTOLIN HFA, PROAIR HFA) 90 mcg/actuation inhaler Take 1 Puff by inhalation every four (4) hours as needed for Wheezing.  busPIRone (BUSPAR) 5 mg tablet Take 1 Tab by mouth three (3) times daily (with meals).  acetaminophen (TYLENOL EXTRA STRENGTH) 500 mg tablet Take  by mouth every six (6) hours as needed for Pain.  lisinopril-hydroCHLOROthiazide (PRINZIDE, ZESTORETIC) 20-12.5 mg per tablet Take 2 Tabs by mouth daily.  calcium carbonate (TUMS) 200 mg calcium (500 mg) chew Take 1 Tab by mouth daily.  DULoxetine (CYMBALTA) 60 mg capsule Take 60 mg by mouth. No current facility-administered medications for this visit. Vitals:    11/15/18 1428 11/15/18 1442   BP: 142/86 148/82   Pulse: 79    Resp: 18    SpO2: 95%    Weight: 181 lb 14.4 oz (82.5 kg)    Height: 5' 8\" (1.727 m)      Social History     Socioeconomic History    Marital status: SINGLE     Spouse name: Not on file    Number of children: Not on file    Years of education: Not on file    Highest education level: Not on file   Social Needs    Financial resource strain: Not on file    Food insecurity - worry: Not on file    Food insecurity - inability: Not on file   [employer] needs - medical: Not on file   [employer] needs - non-medical: Not on file   Occupational History    Not on file   Tobacco Use    Smoking status: Current Every Day Smoker     Packs/day: 1.00     Types: Cigarettes    Smokeless tobacco: Never Used   Substance and Sexual Activity    Alcohol use: No     Alcohol/week: 0.0 oz     Comment: stopped jan 2014    Drug use: No    Sexual activity: Not on file   Other Topics Concern    Not on file   Social History Narrative    Not on file       I have reviewed the nurses notes, vitals, problem list, allergy list, medical history, family, social history and medications. Review of Symptoms:    General: Pt denies excessive weight gain or loss.  Pt is able to conduct ADL's  HEENT: Denies blurred vision, headaches, epistaxis and difficulty swallowing. Respiratory: Denies shortness of breath, + GARCIA, wheezing or stridor. Cardiovascular: + Chest pain, denies palpitations, edema or PND  Gastrointestinal: Denies poor appetite, indigestion, abdominal pain or blood in stool  Musculoskeletal: Patient has recurrent lower extremity pain and left wrist pain from orthopedic surgery for traumatic fracture care  Neurologic: Denies tremor, paresthesias, or sensory motor disturbance  Skin: Denies rash, itching or texture change. Physical Exam:      General: Well developed, in no acute distress, cooperative and alert  HEENT: No carotid bruits, no JVD, trach is midline. Neck Supple, PEERL, EOM intact. Heart:  Normal S1/S2 negative S3 or S4. Regular, no murmur, gallop or rub.   Respiratory: Diminished bilaterally at the bases, scant expiratory wheezing no rhonchi or rales. Abdomen:   Soft, non-tender, no masses, bowel sounds are active.   Extremities:  No edema, normal cap refill, no cyanosis, atraumatic. Neuro: A&Ox3, speech clear, gait ambulates with limp  Skin: Skin color is normal. No rashes or lesions.  Non diaphoretic  Vascular: Absent pulse left posterior tibialis, +1 right posterior tibialis, +1 left popliteal  +1 right popliteal    Cardiographics    ECG: Sinus rhythm with inferior wall Q wave and T wave inversions in the reciprocating leads  Results for orders placed or performed during the hospital encounter of 05/30/11   EKG, 12 LEAD, INITIAL   Result Value Ref Range    Ventricular Rate 68 BPM    Atrial Rate 68 BPM    P-R Interval 192 ms    QRS Duration 88 ms    Q-T Interval 400 ms    QTC Calculation (Bezet) 425 ms    Calculated P Axis 63 degrees    Calculated R Axis 67 degrees    Calculated T Axis 76 degrees    Diagnosis       Normal sinus rhythm  No previous ECGs available  Confirmed by Ernesto Chang MD, Dl Cook ([id number]) on 5/31/2011 10:17:31 AM         Cardiology Labs:  Lab Results   Component Value Date/Time    Cholesterol, total 302 (H) 04/18/2017 10:04 AM    HDL Cholesterol 46 04/18/2017 10:04 AM    LDL,Direct 181 (H) 10/25/2018 01:40 PM    LDL, calculated 227 (H) 04/18/2017 10:04 AM    Triglyceride 144 04/18/2017 10:04 AM       Lab Results   Component Value Date/Time    Sodium 141 10/25/2018 01:40 PM    Potassium 4.7 10/25/2018 01:40 PM    Chloride 101 10/25/2018 01:40 PM    CO2 23 10/25/2018 01:40 PM    Anion gap 9 01/14/2014 11:35 AM    Glucose 89 10/25/2018 01:40 PM    BUN 10 10/25/2018 01:40 PM    Creatinine 0.75 (L) 10/25/2018 01:40 PM    BUN/Creatinine ratio 13 10/25/2018 01:40 PM    GFR est  10/25/2018 01:40 PM    GFR est non- 10/25/2018 01:40 PM    Calcium 10.1 10/25/2018 01:40 PM    Bilirubin, total 0.3 10/25/2018 01:40 PM    AST (SGOT) 19 10/25/2018 01:40 PM    Alk. phosphatase 102 10/25/2018 01:40 PM    Protein, total 7.2 10/25/2018 01:40 PM    Albumin 5.0 10/25/2018 01:40 PM    Globulin 3.6 01/14/2014 11:35 AM    A-G Ratio 2.3 (H) 10/25/2018 01:40 PM    ALT (SGPT) 28 10/25/2018 01:40 PM           Assessment:     Assessment:     Diagnoses and all orders for this visit:    1. Hyperlipemia, mixed  -     AMB POC EKG ROUTINE W/ 12 LEADS, INTER & REP  -     rosuvastatin (CRESTOR) 40 mg tablet; Take 1 Tab by mouth nightly. -     2D ECHO COMPLETE ADULT (TTE) W OR WO CONTR; Future  -     STRESS TEST LEXISCAN/CARDIOLITE; Future    2. Hypertension, essential  -     2D ECHO COMPLETE ADULT (TTE) W OR WO CONTR; Future  -     STRESS TEST LEXISCAN/CARDIOLITE; Future    3. Chronic obstructive pulmonary disease, unspecified COPD type ([address]  -     2D ECHO COMPLETE ADULT (TTE) W OR WO CONTR; Future  -     STRESS TEST LEXISCAN/CARDIOLITE; Future    4. Moderate cigarette smoker (10-19 per day)  -     2D ECHO COMPLETE ADULT (TTE) W OR WO CONTR; Future  -     STRESS TEST LEXISCAN/CARDIOLITE; Future    5. Noncompliance with medication regimen    Other orders  -     metoprolol succinate (TOPROL-XL) 25 mg XL tablet; Take 1 Tab by mouth nightly.   - isosorbide mononitrate ER (IMDUR) 30 mg tablet; Take 0.5 Tabs by mouth daily. -     aspirin delayed-release 81 mg tablet; Take 1 Tab by mouth daily. -     nitroglycerin (NITROSTAT) 0.4 mg SL tablet; 1 Tab by SubLINGual route every five (5) minutes as needed for Chest Pain. Up to 3 doses. ICD-10-CM ICD-9-CM    1. Hyperlipemia, mixed E78.2 272.2 AMB POC EKG ROUTINE W/ 12 LEADS, INTER & REP      rosuvastatin (CRESTOR) 40 mg tablet      2D ECHO COMPLETE ADULT (TTE) W OR WO CONTR      STRESS TEST LEXISCAN/CARDIOLITE   2. Hypertension, essential I10 401.9 2D ECHO COMPLETE ADULT (TTE) W OR WO CONTR      STRESS TEST LEXISCAN/CARDIOLITE   3. Chronic obstructive pulmonary disease, unspecified COPD type ([facility] J44.9 496 2D ECHO COMPLETE ADULT (TTE) W OR WO CONTR      STRESS TEST LEXISCAN/CARDIOLITE   4. Moderate cigarette smoker (10-19 per day) F17.210 305.1 2D ECHO COMPLETE ADULT (TTE) W OR WO CONTR      STRESS TEST LEXISCAN/CARDIOLITE   5. Noncompliance with medication regimen Z91.14 V15.81      Orders Placed This Encounter    AMB POC EKG ROUTINE W/ 12 LEADS, INTER & REP     Order Specific Question:   Reason for Exam:     Answer:   Routine    LEXISCAN/CARDIOLITE, Clinic Performed     Standing Status:   Future     Standing Expiration Date:   5/15/2019     Order Specific Question:   Reason for Exam:     Answer:   chest pain GARCIA    2D ECHO COMPLETE ADULT (TTE) W OR WO CONTR     Standing Status:   Future     Standing Expiration Date:   11/15/2019     Order Specific Question:   Reason for Exam:     Answer:   cp sob     Order Specific Question:   Contrast Enhancement (Bubble Study, Definity, Optison) may be used if criteria listed in established evidence-based protocol has been identified. Answer: Yes    DULoxetine (CYMBALTA) 30 mg capsule     Sig: Take 30 mg by mouth.  DULoxetine (CYMBALTA) 60 mg capsule     Sig: Take 60 mg by mouth.     metoprolol succinate (TOPROL-XL) 25 mg XL tablet     Sig: Take 1 Tab by mouth nightly. Dispense:  30 Tab     Refill:  1    rosuvastatin (CRESTOR) 40 mg tablet     Sig: Take 1 Tab by mouth nightly. Dispense:  90 Tab     Refill:  3    isosorbide mononitrate ER (IMDUR) 30 mg tablet     Sig: Take 0.5 Tabs by mouth daily. Dispense:  30 Tab     Refill:  1    aspirin delayed-release 81 mg tablet     Sig: Take 1 Tab by mouth daily. Dispense:  100 Tab     Refill:  1    nitroglycerin (NITROSTAT) 0.4 mg SL tablet     Si Tab by SubLINGual route every five (5) minutes as needed for Chest Pain. Up to 3 doses. Dispense:  1 Bottle     Refill:  1        Plan:     Patient is a 79-year-old male with a history of hypertension, marked hyperlipidemia, smoker, family history of heart disease and known atherosclerosis of the abdominal aorta presenting with progressive anterior chest discomfort with physical activity and limiting dyspnea on exertion. EKG compared to 2011 right now shows inferior wall Q waves with T wave inversions in  reciprocating leads consistent with old myocardial infarction. 1.  Chest pain: Angina functional class III, will start metoprolol XL 25 mg, Imdur 15 mg. Enteric-coated aspirin 81 mg. Ischemic workup with Lexiscan nuclear stress test and echocardiogram.  2.  Hyperlipidemia: LDL as high as 227 with total cholesterol 302, consistent with familial hyperlipidemia we will maximize statin therapy to Crestor 40 mg nightly. Anticipate patient will need PSK 9 therapy. 3.  Hypertension: 148/82, will improve with beta-blocker and nitrate therapy  Follow-up after testing complete    Dionna Stearns NP    This note was created using voice recognition software. Despite editing, there may be syntax errors. Patient seen and examined by me with nurse practitioner. Ashish Moreno personally performed all components of the history, physical, and medical decision making and agree with the assessment and plan with minor modifications as noted. D/w pt. Smoking cessation. Cutting back.      Ritu Shaw MD

## 2018-11-21 NOTE — PROCEDURES
[address] 
2D ECHOCARDIOGRAM 
 
Name:AZUL RIOS 
MR#: [id number] : 1960 ACCOUNT #: [de-identified] DATE OF SERVICE: 2018 REASON FOR TEST:  Cough. Spirometry and lung volumes were performed and they reveal: 1. Mild airflow obstruction. 2.  No restrictive lung disease. 3.  Air trapping is present. 4.  Normal DLCO. 5.  No significant improvement in FEV1 after bronchodilators. 6.  Flow volume loop is consistent with airflow obstruction. Graciela Felix MD 
  
 
ERG / SN 
D: 2018 11:30    
T: 2018 13:51 
JOB #: [id number] CC: Katty Patton MD

## 2018-12-01 ENCOUNTER — APPOINTMENT (OUTPATIENT)
Dept: GENERAL RADIOLOGY | Age: 58
End: 2018-12-01
Attending: PHYSICIAN ASSISTANT
Payer: MEDICAID

## 2018-12-01 ENCOUNTER — HOSPITAL ENCOUNTER (EMERGENCY)
Age: 58
Discharge: HOME | End: 2018-12-01
Attending: EMERGENCY MEDICINE
Payer: MEDICAID

## 2018-12-01 VITALS
SYSTOLIC BLOOD PRESSURE: 139 MMHG | RESPIRATION RATE: 12 BRPM | HEART RATE: 69 BPM | OXYGEN SATURATION: 100 % | DIASTOLIC BLOOD PRESSURE: 77 MMHG

## 2018-12-01 DIAGNOSIS — R55 SYNCOPE AND COLLAPSE: Primary | ICD-10-CM

## 2018-12-01 DIAGNOSIS — S80.12XA CONTUSION OF LEFT LOWER EXTREMITY, INITIAL ENCOUNTER: ICD-10-CM

## 2018-12-01 LAB
ALBUMIN SERPL-MCNC: 4.4 G/DL (ref 3.5–5)
ALBUMIN/GLOB SERPL: 1.3 {RATIO} (ref 1.1–2.2)
ALP SERPL-CCNC: 109 U/L (ref 45–117)
ALT SERPL-CCNC: 48 U/L (ref 12–78)
ANION GAP SERPL CALC-SCNC: 8 MMOL/L (ref 5–15)
AST SERPL-CCNC: 24 U/L (ref 15–37)
BASOPHILS # BLD: 0.1 K/UL (ref 0–0.1)
BASOPHILS NFR BLD: 0 % (ref 0–1)
BILIRUB SERPL-MCNC: 0.6 MG/DL (ref 0.2–1)
BUN SERPL-MCNC: 15 MG/DL (ref 6–20)
BUN/CREAT SERPL: 15 (ref 12–20)
CALCIUM SERPL-MCNC: 9 MG/DL (ref 8.5–10.1)
CHLORIDE SERPL-SCNC: 105 MMOL/L (ref 97–108)
CK SERPL-CCNC: 377 U/L (ref 39–308)
CO2 SERPL-SCNC: 23 MMOL/L (ref 21–32)
CREAT SERPL-MCNC: 0.98 MG/DL (ref 0.7–1.3)
DIFFERENTIAL METHOD BLD: ABNORMAL
EOSINOPHIL # BLD: 0 K/UL (ref 0–0.4)
EOSINOPHIL NFR BLD: 0 % (ref 0–7)
ERYTHROCYTE [DISTWIDTH] IN BLOOD BY AUTOMATED COUNT: 14.1 % (ref 11.5–14.5)
GLOBULIN SER CALC-MCNC: 3.3 G/DL (ref 2–4)
GLUCOSE SERPL-MCNC: 75 MG/DL (ref 65–100)
HCT VFR BLD AUTO: 40.9 % (ref 36.6–50.3)
HGB BLD-MCNC: 14.2 G/DL (ref 12.1–17)
IMM GRANULOCYTES # BLD: 0.1 K/UL (ref 0–0.04)
IMM GRANULOCYTES NFR BLD AUTO: 1 % (ref 0–0.5)
LYMPHOCYTES # BLD: 1.9 K/UL (ref 0.8–3.5)
LYMPHOCYTES NFR BLD: 11 % (ref 12–49)
MAGNESIUM SERPL-MCNC: 1.9 MG/DL (ref 1.6–2.4)
MCH RBC QN AUTO: 34.1 PG (ref 26–34)
MCHC RBC AUTO-ENTMCNC: 34.7 G/DL (ref 30–36.5)
MCV RBC AUTO: 98.1 FL (ref 80–99)
MONOCYTES # BLD: 1.2 K/UL (ref 0–1)
MONOCYTES NFR BLD: 7 % (ref 5–13)
NEUTS SEG # BLD: 13.9 K/UL (ref 1.8–8)
NEUTS SEG NFR BLD: 81 % (ref 32–75)
NRBC # BLD: 0 K/UL (ref 0–0.01)
NRBC BLD-RTO: 0 PER 100 WBC
PLATELET # BLD AUTO: 215 K/UL (ref 150–400)
PMV BLD AUTO: 10.3 FL (ref 8.9–12.9)
POTASSIUM SERPL-SCNC: 3.7 MMOL/L (ref 3.5–5.1)
PROT SERPL-MCNC: 7.7 G/DL (ref 6.4–8.2)
RBC # BLD AUTO: 4.17 M/UL (ref 4.1–5.7)
SODIUM SERPL-SCNC: 136 MMOL/L (ref 136–145)
TROPONIN I SERPL-MCNC: <0.05 NG/ML
WBC # BLD AUTO: 17.1 K/UL (ref 4.1–11.1)

## 2018-12-01 PROCEDURE — 74011250636 HC RX REV CODE- 250/636: Performed by: PHYSICIAN ASSISTANT

## 2018-12-01 PROCEDURE — 96375 TX/PRO/DX INJ NEW DRUG ADDON: CPT

## 2018-12-01 PROCEDURE — 74011250637 HC RX REV CODE- 250/637: Performed by: EMERGENCY MEDICINE

## 2018-12-01 PROCEDURE — 99283 EMERGENCY DEPT VISIT LOW MDM: CPT

## 2018-12-01 PROCEDURE — 82550 ASSAY OF CK (CPK): CPT

## 2018-12-01 PROCEDURE — 36415 COLL VENOUS BLD VENIPUNCTURE: CPT

## 2018-12-01 PROCEDURE — 83735 ASSAY OF MAGNESIUM: CPT

## 2018-12-01 PROCEDURE — 84484 ASSAY OF TROPONIN QUANT: CPT

## 2018-12-01 PROCEDURE — 73560 X-RAY EXAM OF KNEE 1 OR 2: CPT

## 2018-12-01 PROCEDURE — 74011250636 HC RX REV CODE- 250/636: Performed by: EMERGENCY MEDICINE

## 2018-12-01 PROCEDURE — 85025 COMPLETE CBC W/AUTO DIFF WBC: CPT

## 2018-12-01 PROCEDURE — 96374 THER/PROPH/DIAG INJ IV PUSH: CPT

## 2018-12-01 PROCEDURE — 80053 COMPREHEN METABOLIC PANEL: CPT

## 2018-12-01 PROCEDURE — 73552 X-RAY EXAM OF FEMUR 2/>: CPT

## 2018-12-01 PROCEDURE — 72170 X-RAY EXAM OF PELVIS: CPT

## 2018-12-01 RX ORDER — MORPHINE SULFATE 4 MG/ML
4 INJECTION INTRAVENOUS ONCE
Status: COMPLETED | OUTPATIENT
Start: 2018-12-01 | End: 2018-12-01

## 2018-12-01 RX ORDER — OXYCODONE HYDROCHLORIDE 5 MG/1
5 TABLET ORAL
Qty: 20 TAB | Refills: 0 | Status: SHIPPED | OUTPATIENT
Start: 2018-12-01 | End: 2019-02-07

## 2018-12-01 RX ORDER — METHOCARBAMOL 750 MG/1
750 TABLET, FILM COATED ORAL
Qty: 20 TAB | Refills: 0 | Status: ON HOLD | OUTPATIENT
Start: 2018-12-01 | End: 2019-01-23

## 2018-12-01 RX ORDER — ONDANSETRON 2 MG/ML
4 INJECTION INTRAMUSCULAR; INTRAVENOUS
Status: COMPLETED | OUTPATIENT
Start: 2018-12-01 | End: 2018-12-01

## 2018-12-01 RX ORDER — FENTANYL CITRATE 50 UG/ML
50 INJECTION, SOLUTION INTRAMUSCULAR; INTRAVENOUS
Status: COMPLETED | OUTPATIENT
Start: 2018-12-01 | End: 2018-12-01

## 2018-12-01 RX ORDER — DIAZEPAM 5 MG/1
5 TABLET ORAL
Status: COMPLETED | OUTPATIENT
Start: 2018-12-01 | End: 2018-12-01

## 2018-12-01 RX ADMIN — MORPHINE SULFATE 4 MG: 4 INJECTION INTRAVENOUS at 12:42

## 2018-12-01 RX ADMIN — ONDANSETRON 4 MG: 2 INJECTION INTRAMUSCULAR; INTRAVENOUS at 13:26

## 2018-12-01 RX ADMIN — FENTANYL CITRATE 50 MCG: 50 INJECTION, SOLUTION INTRAMUSCULAR; INTRAVENOUS at 13:26

## 2018-12-01 RX ADMIN — DIAZEPAM 5 MG: 5 TABLET ORAL at 14:27

## 2018-12-01 NOTE — ED PROVIDER NOTES
Patient Name: Leisa Jackson History of Presenting Illness Chief Complaint Patient presents with  Leg Pain LEFT leg pain (femur) froma  GLF last night, pt has hx of femur fracture 20 years ago with \"rods\"  Wrist Pain LEFT wrist pain post-fall. History Provided By: Patient HPI: Leisa Jackson, 60 y.o. male with PMHx significant for CAD, HTN, and hypercholesterolemia, presents via EMS to the ED with cc of new onset, worsening left leg and knee pain which began s/p mechanical fall yesterday night with accompanying episode of syncope. Pt states he missed a step on his porch and fell on top of a 4x4. Pt adds porch is 2 feet high. Pt notes pain was so bad on impact that pt passed out. Pt denies hitting his head. When pt woke up, he crawled back into his house. Pt tried to sleep off pain, but pain worsened through the night prompting pt to call EMS Pt rates current leg pain as 10/10 in intensity which worsens with movement. Pt also describes leg and knee pain as sharp in nature. Pt denies drinking any alcohol recently. Pt specifically denies n/v, fever, SOB, chest pain, abdominal pain, and chills. Social Hx: + Tobacco, - EtOH, - Illicit Drugs There are no other complaints, changes, or physical findings at this time. PCP: Viridiana Orona MD 
 
Current Outpatient Medications Medication Sig Dispense Refill  methocarbamol (ROBAXIN-750) 750 mg tablet Take 1 Tab by mouth four (4) times daily as needed. 20 Tab 0  
 oxyCODONE IR (ROXICODONE) 5 mg immediate release tablet Take 1 Tab by mouth every four (4) hours as needed for Pain. Max Daily Amount: 30 mg. 20 Tab 0  
 DULoxetine (CYMBALTA) 30 mg capsule Take 30 mg by mouth.  DULoxetine (CYMBALTA) 60 mg capsule Take 60 mg by mouth.  metoprolol succinate (TOPROL-XL) 25 mg XL tablet Take 1 Tab by mouth nightly. 30 Tab 1  
 rosuvastatin (CRESTOR) 40 mg tablet Take 1 Tab by mouth nightly.  90 Tab 3  
  isosorbide mononitrate ER (IMDUR) 30 mg tablet Take 0.5 Tabs by mouth daily. 30 Tab 1  
 aspirin delayed-release 81 mg tablet Take 1 Tab by mouth daily. 100 Tab 1  
 nitroglycerin (NITROSTAT) 0.4 mg SL tablet 1 Tab by SubLINGual route every five (5) minutes as needed for Chest Pain. Up to 3 doses. 1 Bottle 1  
 omeprazole (PRILOSEC) 40 mg capsule Take 1 Cap by mouth two (2) times a day. 60 Cap 11  
 albuterol (PROVENTIL HFA, VENTOLIN HFA, PROAIR HFA) 90 mcg/actuation inhaler Take 1 Puff by inhalation every four (4) hours as needed for Wheezing. 1 Inhaler 2  
 busPIRone (BUSPAR) 5 mg tablet Take 1 Tab by mouth three (3) times daily (with meals). 90 Tab 5  
 acetaminophen (TYLENOL EXTRA STRENGTH) 500 mg tablet Take  by mouth every six (6) hours as needed for Pain.  lisinopril-hydroCHLOROthiazide (PRINZIDE, ZESTORETIC) 20-12.5 mg per tablet Take 2 Tabs by mouth daily. 180 Tab 1  calcium carbonate (TUMS) 200 mg calcium (500 mg) chew Take 1 Tab by mouth daily. Past History Past Medical History: 
Past Medical History:  
Diagnosis Date  Adrenal mass, right ([address] 2014 Seen on abd CT  CAD (coronary artery disease)   
 calcific atherosclerosis on CT  Depression  Hypercholesterolemia  Hypertension  Multiple lung nodules Past Surgical History: 
Past Surgical History:  
Procedure Laterality Date  HX ORTHOPAEDIC    
 hand  HX ORTHOPAEDIC    
 foot Family History: 
Family History Problem Relation Age of Onset  Heart Disease Mother  Asthma Mother  Elevated Lipids Mother  Hypertension Mother Social History: 
Social History Tobacco Use  Smoking status: Current Every Day Smoker Packs/day: 1.00 Types: Cigarettes  Smokeless tobacco: Never Used Substance Use Topics  Alcohol use: No  
  Alcohol/week: 0.0 oz  
  Comment: stopped jan 2014  Drug use: No  
 
 
Allergies: Allergies Allergen Reactions  Advil [Ibuprofen] Hives  Duloxetine Diarrhea  Fluoxetine Diarrhea  Lovastatin Diarrhea  Tylenol [Acetaminophen] Hives Review of Systems Review of Systems Constitutional: Negative for chills and fever. HENT: Negative. Eyes: Negative. Respiratory: Negative for shortness of breath. Cardiovascular: Negative for chest pain. Gastrointestinal: Negative for abdominal pain. Endocrine: Negative for heat intolerance. Genitourinary: Negative. Musculoskeletal: Positive for arthralgias (left knee) and myalgias (left leg). Skin: Negative. Allergic/Immunologic: Negative for immunocompromised state. Neurological: Positive for syncope. Hematological: Does not bruise/bleed easily. Psychiatric/Behavioral: Negative. All other systems reviewed and are negative. Physical Exam  
Physical Exam  
Constitutional: He is oriented to person, place, and time. He appears well-developed and well-nourished. He appears distressed (mild). HENT:  
Head: Normocephalic and atraumatic. Eyes: EOM are normal. Pupils are equal, round, and reactive to light. Neck: Normal range of motion. Neck supple. Cardiovascular: Normal rate, regular rhythm and normal heart sounds. Pulses: 
     Dorsalis pedis pulses are 2+ on the right side. Posterior tibial pulses are 2+ on the right side. Pulmonary/Chest: Effort normal and breath sounds normal. He has no wheezes. Abdominal: Soft. Bowel sounds are normal. There is no tenderness. Musculoskeletal: Normal range of motion. He exhibits no edema. Left wrist: He exhibits normal range of motion and no tenderness. Left knee: Tenderness found. Left upper leg: He exhibits tenderness (left thigh). Left lower leg: He exhibits no tenderness. Neurological: He is alert and oriented to person, place, and time. No cranial nerve deficit or sensory deficit. Skin: Skin is warm and dry. Psychiatric: He has a normal mood and affect. Nursing note and vitals reviewed. Diagnostic Study Results Labs - Recent Results (from the past 12 hour(s)) CBC WITH AUTOMATED DIFF Collection Time: 12/01/18  1:16 PM  
Result Value Ref Range WBC 17.1 (H) 4.1 - 11.1 K/uL  
 RBC 4.17 4.10 - 5.70 M/uL  
 HGB 14.2 12.1 - 17.0 g/dL HCT 40.9 36.6 - 50.3 % MCV 98.1 80.0 - 99.0 FL  
 MCH 34.1 (H) 26.0 - 34.0 PG  
 MCHC 34.7 30.0 - 36.5 g/dL  
 RDW 14.1 11.5 - 14.5 % PLATELET 368 894 - 531 K/uL MPV 10.3 8.9 - 12.9 FL  
 NRBC 0.0 0  WBC ABSOLUTE NRBC 0.00 0.00 - 0.01 K/uL NEUTROPHILS 81 (H) 32 - 75 % LYMPHOCYTES 11 (L) 12 - 49 % MONOCYTES 7 5 - 13 % EOSINOPHILS 0 0 - 7 % BASOPHILS 0 0 - 1 % IMMATURE GRANULOCYTES 1 (H) 0.0 - 0.5 % ABS. NEUTROPHILS 13.9 (H) 1.8 - 8.0 K/UL  
 ABS. LYMPHOCYTES 1.9 0.8 - 3.5 K/UL  
 ABS. MONOCYTES 1.2 (H) 0.0 - 1.0 K/UL  
 ABS. EOSINOPHILS 0.0 0.0 - 0.4 K/UL  
 ABS. BASOPHILS 0.1 0.0 - 0.1 K/UL  
 ABS. IMM. GRANS. 0.1 (H) 0.00 - 0.04 K/UL  
 DF AUTOMATED METABOLIC PANEL, COMPREHENSIVE Collection Time: 12/01/18  1:16 PM  
Result Value Ref Range Sodium 136 136 - 145 mmol/L Potassium 3.7 3.5 - 5.1 mmol/L Chloride 105 97 - 108 mmol/L  
 CO2 23 21 - 32 mmol/L Anion gap 8 5 - 15 mmol/L Glucose 75 65 - 100 mg/dL BUN 15 6 - 20 MG/DL Creatinine 0.98 0.70 - 1.30 MG/DL  
 BUN/Creatinine ratio 15 12 - 20 GFR est AA >60 >60 ml/min/1.73m2 GFR est non-AA >60 >60 ml/min/1.73m2 Calcium 9.0 8.5 - 10.1 MG/DL Bilirubin, total 0.6 0.2 - 1.0 MG/DL  
 ALT (SGPT) 48 12 - 78 U/L  
 AST (SGOT) 24 15 - 37 U/L Alk. phosphatase 109 45 - 117 U/L Protein, total 7.7 6.4 - 8.2 g/dL Albumin 4.4 3.5 - 5.0 g/dL Globulin 3.3 2.0 - 4.0 g/dL A-G Ratio 1.3 1.1 - 2.2 MAGNESIUM Collection Time: 12/01/18  1:16 PM  
Result Value Ref Range Magnesium 1.9 1.6 - 2.4 mg/dL CK  Collection Time: 12/01/18  1:16 PM  
 Result Value Ref Range  (H) 39 - 308 U/L  
TROPONIN I Collection Time: 12/01/18  1:16 PM  
Result Value Ref Range Troponin-I, Qt. <0.05 <0.05 ng/mL Radiologic Studies -  
XR FEMUR LT 2 V Final Result XR KNEE LT MAX 2 VWS Final Result XR PELV AP ONLY Final Result CT Results  (Last 48 hours) None CXR Results  (Last 48 hours) None Medical Decision Making I am the first provider for this patient. I reviewed the vital signs, available nursing notes, past medical history, past surgical history, family history and social history. Vital Signs-Reviewed the patient's vital signs. Patient Vitals for the past 12 hrs: 
 Pulse Resp BP SpO2  
12/01/18 1234 69 12 139/77 100 % Pulse Oximetry Analysis - 100% on RA Cardiac Monitor:  
Rate: 69 bpm 
Rhythm: Normal Sinus Rhythm Records Reviewed: Nursing Notes, Old Medical Records and Previous Radiology Studies Provider Notes (Medical Decision Making): DDx: sprain, strain, fx, contusion, musculoskeletal pain, syncope, vasovagal 
 
ED Course:  
Initial assessment performed. The patients presenting problems have been discussed, and they are in agreement with the care plan formulated and outlined with them. I have encouraged them to ask questions as they arise throughout their visit. ED Course as of Dec 01 1432 Sat Dec 01, 2018  
1353 Pt is feeling better. Written by Amanda Daniel ED Scribe as dictated by Erika Marsh MD 
  [KW] ED Course User Index 
[KW] Sharyle Broaden Critical Care Time: 0 minutes Disposition: 
Discharge Note: 
2:43 PM 
The pt is ready for discharge. The pt's signs, symptoms, diagnosis, and discharge instructions have been discussed and pt has conveyed their understanding. The pt is to follow up as recommended or return to ER should their symptoms worsen. Plan has been discussed and pt is in agreement. PLAN: 
1. Discharge Home Current Discharge Medication List  
  
START taking these medications Details  
methocarbamol (ROBAXIN-750) 750 mg tablet Take 1 Tab by mouth four (4) times daily as needed. Qty: 20 Tab, Refills: 0  
  
oxyCODONE IR (ROXICODONE) 5 mg immediate release tablet Take 1 Tab by mouth every four (4) hours as needed for Pain. Max Daily Amount: 30 mg. 
Qty: 20 Tab, Refills: 0 Associated Diagnoses: Contusion of left lower extremity, initial encounter 2. Follow-up Information Follow up With Specialties Details Why Contact Info Symone Samuel, DO Orthopedic Surgery In 2 days As needed [address] [facility] [address] [facility] 
[phone] [facility] EMERGENCY DEPT Emergency Medicine  If symptoms worsen [address] [facility] [address] [facility] 
[phone] Return to ED if worse Diagnosis Clinical Impression: 1. Syncope and collapse 2. Contusion of left lower extremity, initial encounter Attestations: This note is prepared by Emani Gibbs, acting as Scribe for Luis Hudson MD. 
 
The scribe's documentation has been prepared under my direction and personally reviewed by me in its entirety. I confirm that the note above accurately reflects all work, treatment, procedures, and medical decision making performed by me.  
Luis Hudson MD

## 2018-12-01 NOTE — DISCHARGE INSTRUCTIONS
Contusion: Care Instructions  Your Care Instructions  Contusion is the medical term for a bruise. It is the result of a direct blow or an impact, such as a fall. Contusions are common sports injuries. Most people think of a bruise as a black-and-blue spot. This happens when small blood vessels get torn and leak blood under the skin. But bones, muscles, and organs can also get bruised. This may damage deep tissues but not cause a bruise you can see. The doctor will do a physical exam to find the location of your contusion. You may also have tests to make sure you do not have a more serious injury, such as a broken bone or nerve damage. These may include X-rays or other imaging tests like a CT scan or MRI. Deep-tissue contusions may cause pain and swelling. But if there is no serious damage, they will often get better in a few weeks with home treatment. The doctor has checked you carefully, but problems can develop later. If you notice any problems or new symptoms, get medical treatment right away. Follow-up care is a key part of your treatment and safety. Be sure to make and go to all appointments, and call your doctor if you are having problems. It's also a good idea to know your test results and keep a list of the medicines you take. How can you care for yourself at home? · Put ice or a cold pack on the sore area for 10 to 20 minutes at a time to stop swelling. Put a thin cloth between the ice pack and your skin. · Be safe with medicines. Read and follow all instructions on the label. ¨ If the doctor gave you a prescription medicine for pain, take it as prescribed. ¨ If you are not taking a prescription pain medicine, ask your doctor if you can take an over-the-counter medicine. · If you can, prop up the sore area on pillows as much as possible for the next few days. Try to keep the sore area above the level of your heart. When should you call for help?   Call your doctor now or seek immediate medical care if:  · Your pain gets worse. · You have new or worse swelling. · You have tingling, weakness, or numbness in the area near the contusion. · The area near the contusion is cold or pale. Watch closely for changes in your health, and be sure to contact your doctor if:  · You do not get better as expected. Where can you learn more? Go to gamesGRABR.be  Enter [id number] in the search box to learn more about \"Contusion: Care Instructions. \"   © 5291-1716 Healthwise, [employer]. Care instructions adapted under license by [facility] [employer] (which disclaims liability or warranty for this information). This care instruction is for use with your licensed healthcare professional. If you have questions about a medical condition or this instruction, always ask your healthcare professional. [address] any warranty or liability for your use of this information. Content Version: 76.0.958580; Current as of: May 22, 2015             Fainting: Care Instructions  Your Care Instructions    When you faint, or pass out, you lose consciousness for a short time. A brief drop in blood flow to the brain often causes it. When you fall or lie down, more blood flows to your brain and you regain consciousness. Emotional stress, pain, or overheating--especially if you have been standing--can make you faint. In these cases, fainting is usually not serious. But fainting can be a sign of a more serious problem. Your doctor may want you to have more tests to rule out other causes. The treatment you need depends on the reason why you fainted. The doctor has checked you carefully, but problems can develop later. If you notice any problems or new symptoms, get medical treatment right away. Follow-up care is a key part of your treatment and safety. Be sure to make and go to all appointments, and call your doctor if you are having problems.  It's also a good idea to know your test results and keep a list of the medicines you take. How can you care for yourself at home? · Drink plenty of fluids to prevent dehydration. If you have kidney, heart, or liver disease and have to limit fluids, talk with your doctor before you increase your fluid intake. When should you call for help? Call 911 anytime you think you may need emergency care. For example, call if:    · You have symptoms of a heart problem. These may include:  ? Chest pain or pressure. ? Severe trouble breathing. ? A fast or irregular heartbeat. ? Lightheadedness or sudden weakness. ? Coughing up pink, foamy mucus. ? Passing out. After you call 911, the  may tell you to chew 1 adult-strength or 2 to 4 low-dose aspirin. Wait for an ambulance. Do not try to drive yourself.     · You have symptoms of a stroke. These may include:  ? Sudden numbness, tingling, weakness, or loss of movement in your face, arm, or leg, especially on only one side of your body. ? Sudden vision changes. ? Sudden trouble speaking. ? Sudden confusion or trouble understanding simple statements. ? Sudden problems with walking or balance. ? A sudden, severe headache that is different from past headaches.     · You passed out (lost consciousness) again.    Watch closely for changes in your health, and be sure to contact your doctor if:    · You do not get better as expected. Where can you learn more? Go to http://nehemiah-nadia.info/. Enter E816 in the search box to learn more about \"Fainting: Care Instructions. \"  Current as of: November 20, 2017  Content Version: 11.8  © 3153-4483 [employer]. Care instructions adapted under license by [employer] (which disclaims liability or warranty for this information). If you have questions about a medical condition or this instruction, always ask your healthcare professional. [address] any warranty or liability for your use of this information.          Leg Pain: Care Instructions  Your Care Instructions  Many things can cause leg pain. Too much exercise or overuse can cause a muscle cramp (or charley horse). You can get leg cramps from not eating a balanced diet that has enough potassium, calcium, and other minerals. If you do not drink enough fluids or are taking certain medicines, you may develop leg cramps. Other causes of leg pain include injuries, blood flow problems, nerve damage, and twisted and enlarged veins (varicose veins). You can usually ease pain with self-care. Your doctor may recommend that you rest your leg and keep it elevated. Follow-up care is a key part of your treatment and safety. Be sure to make and go to all appointments, and call your doctor if you are having problems. It's also a good idea to know your test results and keep a list of the medicines you take. How can you care for yourself at home? · Take pain medicines exactly as directed. ? If the doctor gave you a prescription medicine for pain, take it as prescribed. ? If you are not taking a prescription pain medicine, ask your doctor if you can take an over-the-counter medicine. · Take any other medicines exactly as prescribed. Call your doctor if you think you are having a problem with your medicine. · Rest your leg while you have pain, and avoid standing for long periods of time. · Prop up your leg at or above the level of your heart when possible. · Make sure you are eating a balanced diet that is rich in calcium, potassium, and magnesium, especially if you are pregnant. · If directed by your doctor, put ice or a cold pack on the area for 10 to 20 minutes at a time. Put a thin cloth between the ice and your skin. · Your leg may be in a splint, a brace, or an elastic bandage, and you may have crutches to help you walk. Follow your doctor's directions about how long to wear supports and how to use the crutches. When should you call for help?   Call 911 anytime you think you may need emergency care. For example, call if:    · You have sudden chest pain and shortness of breath, or you cough up blood.     · Your leg is cool or pale or changes color.    Call your doctor now or seek immediate medical care if:    · You have increasing or severe pain.     · Your leg suddenly feels weak and you cannot move it.     · You have signs of a blood clot, such as:  ? Pain in your calf, back of the knee, thigh, or groin. ? Redness and swelling in your leg or groin.     · You have signs of infection, such as:  ? Increased pain, swelling, warmth, or redness. ? Red streaks leading from the sore area. ? Pus draining from a place on your leg. ? A fever.     · You cannot bear weight on your leg.    Watch closely for changes in your health, and be sure to contact your doctor if:    · You do not get better as expected. Where can you learn more? Go to http://nehemiah-nadia.info/. Enter J788 in the search box to learn more about \"Leg Pain: Care Instructions. \"  Current as of: November 20, 2017  Content Version: 11.8  © 1013-7541 [employer]. Care instructions adapted under license by [employer] (which disclaims liability or warranty for this information). If you have questions about a medical condition or this instruction, always ask your healthcare professional. [address] any warranty or liability for your use of this information.

## 2018-12-01 NOTE — PROCEDURES
[address]  PULMONARY FUNCTION    Name:AZUL RIOS  MR#: [id number]  : 1960  ACCOUNT #: [de-identified]   DATE OF SERVICE: 2018    REASON FOR TEST:  Cough. Spirometry and lung volumes were performed and they reveal:  1. Mild airflow obstruction. 2.  No restrictive lung disease. 3.  Air trapping is present. 4.  Normal DLCO. 5.  No significant improvement in FEV1 after bronchodilators. 6.  Flow volume loop is consistent with airflow obstruction.       Rere Barber MD       ERG / SN  D: 2018 11:30     T: 2018 13:51  JOB #: [id number]  CC: Lara Saeed MD

## 2018-12-12 DIAGNOSIS — S80.12XA CONTUSION OF LEFT LOWER EXTREMITY, INITIAL ENCOUNTER: ICD-10-CM

## 2018-12-12 RX ORDER — OXYCODONE HYDROCHLORIDE 5 MG/1
5 TABLET ORAL
Qty: 20 TAB | Refills: 0 | Status: CANCELLED | OUTPATIENT
Start: 2018-12-12

## 2018-12-12 RX ORDER — METHOCARBAMOL 750 MG/1
750 TABLET, FILM COATED ORAL
Qty: 20 TAB | Refills: 0 | Status: CANCELLED | OUTPATIENT
Start: 2018-12-12

## 2018-12-12 NOTE — TELEPHONE ENCOUNTER
----- Message from Rocio Tai sent at 12/12/2018  1:23 PM EST -----  Regarding: Dr. Harris Herd: [phone]  Pt would like to know if prescription for Oxycodone 5mg , Methocarbamol 750 mg is ready for pickup.  Best contact [phone]

## 2019-01-07 ENCOUNTER — OFFICE VISIT (OUTPATIENT)
Dept: CARDIOLOGY CLINIC | Age: 59
End: 2019-01-07

## 2019-01-07 VITALS
WEIGHT: 184.6 LBS | BODY MASS INDEX: 27.98 KG/M2 | RESPIRATION RATE: 18 BRPM | HEART RATE: 77 BPM | DIASTOLIC BLOOD PRESSURE: 98 MMHG | SYSTOLIC BLOOD PRESSURE: 160 MMHG | HEIGHT: 68 IN | OXYGEN SATURATION: 97 %

## 2019-01-07 DIAGNOSIS — J44.9 CHRONIC OBSTRUCTIVE PULMONARY DISEASE, UNSPECIFIED COPD TYPE (HCC): ICD-10-CM

## 2019-01-07 DIAGNOSIS — E78.2 HYPERLIPEMIA, MIXED: ICD-10-CM

## 2019-01-07 DIAGNOSIS — R06.09 DOE (DYSPNEA ON EXERTION): Primary | ICD-10-CM

## 2019-01-07 DIAGNOSIS — I10 HYPERTENSION, ESSENTIAL: ICD-10-CM

## 2019-01-07 RX ORDER — ISOSORBIDE MONONITRATE 30 MG/1
30 TABLET, EXTENDED RELEASE ORAL DAILY
Qty: 30 TAB | Refills: 1 | Status: SHIPPED | OUTPATIENT
Start: 2019-01-07 | End: 2019-02-07 | Stop reason: SDUPTHER

## 2019-01-07 NOTE — PROGRESS NOTES
Lexi Delaney DNP, ANP-BC  Subjective/HPI:     Mirna Caballero is a 60 y.o. male is here for follow-up. Initially saw the patient new patient consult November 15 exhibiting increased dyspnea on exertion and chest discomfort. He was placed on dual antianginal therapy and states his chest pain has improved however remains dyspneic with walking or exertional activities. That he was supposed to have nuclear stress test and echocardiogram performed states he fell injuring his left knee and had to cancel the appointments. He reports compliance in taking the medications on his MAR. PCP Provider  Elvia Benavidez MD  Past Medical History:   Diagnosis Date    Adrenal mass, right [facility]) 2014    Seen on abd CT    CAD (coronary artery disease)     calcific atherosclerosis on CT    Depression     Hypercholesterolemia     Hypertension     Multiple lung nodules       Past Surgical History:   Procedure Laterality Date    HX ORTHOPAEDIC      hand    HX ORTHOPAEDIC      foot     Allergies   Allergen Reactions    Advil [Ibuprofen] Hives    Duloxetine Diarrhea    Fluoxetine Diarrhea    Lovastatin Diarrhea    Tylenol [Acetaminophen] Hives      Family History   Problem Relation Age of Onset    Heart Disease Mother     Asthma Mother     Elevated Lipids Mother     Hypertension Mother       Current Outpatient Medications   Medication Sig    isosorbide mononitrate ER (IMDUR) 30 mg tablet Take 1 Tab by mouth daily.  methocarbamol (ROBAXIN-750) 750 mg tablet Take 1 Tab by mouth four (4) times daily as needed.  oxyCODONE IR (ROXICODONE) 5 mg immediate release tablet Take 1 Tab by mouth every four (4) hours as needed for Pain. Max Daily Amount: 30 mg.    DULoxetine (CYMBALTA) 30 mg capsule Take 30 mg by mouth daily.  DULoxetine (CYMBALTA) 60 mg capsule Take 60 mg by mouth daily.  metoprolol succinate (TOPROL-XL) 25 mg XL tablet Take 1 Tab by mouth nightly.     rosuvastatin (CRESTOR) 40 mg tablet Take 1 Tab by mouth nightly.  aspirin delayed-release 81 mg tablet Take 1 Tab by mouth daily.  nitroglycerin (NITROSTAT) 0.4 mg SL tablet 1 Tab by SubLINGual route every five (5) minutes as needed for Chest Pain. Up to 3 doses.  omeprazole (PRILOSEC) 40 mg capsule Take 1 Cap by mouth two (2) times a day.  albuterol (PROVENTIL HFA, VENTOLIN HFA, PROAIR HFA) 90 mcg/actuation inhaler Take 1 Puff by inhalation every four (4) hours as needed for Wheezing.  busPIRone (BUSPAR) 5 mg tablet Take 1 Tab by mouth three (3) times daily (with meals).  lisinopril-hydroCHLOROthiazide (PRINZIDE, ZESTORETIC) 20-12.5 mg per tablet Take 2 Tabs by mouth daily.  calcium carbonate (TUMS) 200 mg calcium (500 mg) chew Take 1 Tab by mouth daily.  acetaminophen (TYLENOL EXTRA STRENGTH) 500 mg tablet Take  by mouth every six (6) hours as needed for Pain. No current facility-administered medications for this visit.        Vitals:    01/07/19 0917 01/07/19 0930   BP: (!) 152/92 (!) 160/98   Pulse: 77    Resp: 18    SpO2: 97%    Weight: 184 lb 9.6 oz (83.7 kg)    Height: 5' 8\" (1.727 m)      Social History     Socioeconomic History    Marital status: SINGLE     Spouse name: Not on file    Number of children: Not on file    Years of education: Not on file    Highest education level: Not on file   Social Needs    Financial resource strain: Not on file    Food insecurity - worry: Not on file    Food insecurity - inability: Not on file   [email or web address] needs - medical: Not on file   [employer] needs - non-medical: Not on file   Occupational History    Not on file   Tobacco Use    Smoking status: Current Every Day Smoker     Packs/day: 1.00     Types: Cigarettes    Smokeless tobacco: Never Used   Substance and Sexual Activity    Alcohol use: No     Alcohol/week: 0.0 oz     Comment: stopped jan 2014    Drug use: No    Sexual activity: Not on file   Other Topics Concern    Not on file   Social History Narrative  Not on file       I have reviewed the nurses notes, vitals, problem list, allergy list, medical history, family, social history and medications. Review of Symptoms:    General: Pt denies excessive weight gain or loss. Pt is able to conduct ADL's  HEENT: Denies blurred vision, headaches, epistaxis and difficulty swallowing. Respiratory: Denies shortness of breath,+ GARCIA, wheezing or stridor. Cardiovascular: Denies precordial pain, palpitations, edema or PND  Gastrointestinal: Denies poor appetite, indigestion, abdominal pain or blood in stool  Musculoskeletal: Denies pain or swelling from muscles or joints. Neurologic: Denies tremor, paresthesias, or sensory motor disturbance  Skin: Denies rash, itching or texture change. Physical Exam:      General: Well developed, in no acute distress, cooperative and alert  HEENT: No carotid bruits, no JVD, trach is midline. Neck Supple, PEERL, EOM intact. Heart:  Normal S1/S2 negative S3 or S4. Regular, no murmur, gallop or rub.   Respiratory: Mildly diminished bilateral bases to mid lung fields, no wheezing rales or rhonchi  Abdomen:   Soft, non-tender, no masses, bowel sounds are active.   Extremities:  No edema, normal cap refill, no cyanosis, atraumatic. Neuro: A&Ox3, speech clear, gait stable. Skin: Skin color is normal. No rashes or lesions. Non diaphoretic  Vascular: +1 bilateral posterior tibialis, +2 bilateral radials. For second cap refill bilateral thumbs.     Cardiographics    ECG: Sinus rhythm  Results for orders placed or performed during the hospital encounter of 05/30/11   EKG, 12 LEAD, INITIAL   Result Value Ref Range    Ventricular Rate 68 BPM    Atrial Rate 68 BPM    P-R Interval 192 ms    QRS Duration 88 ms    Q-T Interval 400 ms    QTC Calculation (Bezet) 425 ms    Calculated P Axis 63 degrees    Calculated R Axis 67 degrees    Calculated T Axis 76 degrees    Diagnosis       Normal sinus rhythm  No previous ECGs available  Confirmed by Makayla Trevino MD, Argenisbrisa Milanjohn ([id number]) on 5/31/2011 10:17:31 AM         Cardiology Labs:  Lab Results   Component Value Date/Time    Cholesterol, total 302 (H) 04/18/2017 10:04 AM    HDL Cholesterol 46 04/18/2017 10:04 AM    LDL,Direct 181 (H) 10/25/2018 01:40 PM    LDL, calculated 227 (H) 04/18/2017 10:04 AM    Triglyceride 144 04/18/2017 10:04 AM       Lab Results   Component Value Date/Time    Sodium 136 12/01/2018 01:16 PM    Potassium 3.7 12/01/2018 01:16 PM    Chloride 105 12/01/2018 01:16 PM    CO2 23 12/01/2018 01:16 PM    Anion gap 8 12/01/2018 01:16 PM    Glucose 75 12/01/2018 01:16 PM    BUN 15 12/01/2018 01:16 PM    Creatinine 0.98 12/01/2018 01:16 PM    BUN/Creatinine ratio 15 12/01/2018 01:16 PM    GFR est AA >60 12/01/2018 01:16 PM    GFR est non-AA >60 12/01/2018 01:16 PM    Calcium 9.0 12/01/2018 01:16 PM    Bilirubin, total 0.6 12/01/2018 01:16 PM    AST (SGOT) 24 12/01/2018 01:16 PM    Alk. phosphatase 109 12/01/2018 01:16 PM    Protein, total 7.7 12/01/2018 01:16 PM    Albumin 4.4 12/01/2018 01:16 PM    Globulin 3.3 12/01/2018 01:16 PM    A-G Ratio 1.3 12/01/2018 01:16 PM    ALT (SGPT) 48 12/01/2018 01:16 PM           Assessment:     Assessment:     Diagnoses and all orders for this visit:    1. GARCIA (dyspnea on exertion)  -     2D ECHO COMPLETE ADULT (TTE) W OR WO CONTR; Future  -     STRESS TEST LEXISCAN/CARDIOLITE; Future    2. Hypertension, essential  -     AMB POC EKG ROUTINE W/ 12 LEADS, INTER & REP    3. Hyperlipemia, mixed  -     2D ECHO COMPLETE ADULT (TTE) W OR WO CONTR; Future  -     STRESS TEST LEXISCAN/CARDIOLITE; Future    4. Chronic obstructive pulmonary disease, unspecified COPD type ([address]    Other orders  -     isosorbide mononitrate ER (IMDUR) 30 mg tablet; Take 1 Tab by mouth daily. ICD-10-CM ICD-9-CM    1. GARCIA (dyspnea on exertion) R06.09 786.09 2D ECHO COMPLETE ADULT (TTE) W OR WO CONTR      STRESS TEST LEXISCAN/CARDIOLITE   2.  Hypertension, essential I10 401.9 AMB POC EKG ROUTINE W/ 12 LEADS, INTER & REP   3. Hyperlipemia, mixed E78.2 272.2 2D ECHO COMPLETE ADULT (TTE) W OR WO CONTR      STRESS TEST LEXISCAN/CARDIOLITE   4. Chronic obstructive pulmonary disease, unspecified COPD type ([address] J44.9 496      Orders Placed This Encounter    AMB POC EKG ROUTINE W/ 12 LEADS, INTER & REP     Order Specific Question:   Reason for Exam:     Answer:   routine    LEXISCAN/CARDIOLITE, Clinic Performed     Standing Status:   Future     Standing Expiration Date:   7/7/2019     Order Specific Question:   Reason for Exam:     Answer:   Garcia    2D ECHO COMPLETE ADULT (TTE) W OR WO CONTR     Standing Status:   Future     Standing Expiration Date:   1/7/2020     Order Specific Question:   Reason for Exam:     Answer:   Garcia     Order Specific Question:   Contrast Enhancement (Bubble Study, Definity, Optison) may be used if criteria listed in established evidence-based protocol has been identified. Answer: Yes    isosorbide mononitrate ER (IMDUR) 30 mg tablet     Sig: Take 1 Tab by mouth daily. Dispense:  30 Tab     Refill:  1        Plan:     Patient is a 48-year-old male with COPD presenting with continued dyspnea on exertion although slight improvement in chest discomfort with dual antianginal therapy. Will further increase isosorbide mononitrate 30 mg daily for hypertension management, proceed as planned with nuclear stress test and echocardiogram.  Follow-up when testing complete. GARCIA remains symptoms consistent with angina functional class III, on dual antianginal therapy statin and aspirin. 1.  GARCIA: Anginal equivalent proceed with ischemic evaluation and echocardiogram  2. Hypertension: 160/98, increasing nitrates echo to evaluate for structural defects  3. Hyperlipidemia: On statin therapy  4. Tobacco abuse: Counseled on smoking cessation. Romain Elizondo NP    This note was created using voice recognition software. Despite editing, there may be syntax errors.        Patient seen and examined by me with nurse practitioner. Ani Simon personally performed all components of the history, physical, and medical decision making and agree with the assessment and plan with minor modifications as noted.     Rocio Alva MD

## 2019-01-07 NOTE — PROGRESS NOTES
1. Have you been to the ER, urgent care clinic since your last visit? Hospitalized since your last visit? Seen on 12/1/19 for leg pain. 2. Have you seen or consulted any other health care providers outside of the [address] [place] [address] since your last visit? Include any pap smears or colon screening.    No.      Chief Complaint   Patient presents with    Other     6 week f/u- soboe

## 2019-01-18 ENCOUNTER — CLINICAL SUPPORT (OUTPATIENT)
Dept: CARDIOLOGY CLINIC | Age: 59
End: 2019-01-18

## 2019-01-18 DIAGNOSIS — R93.1 ABNORMAL NUCLEAR CARDIAC IMAGING TEST: Primary | ICD-10-CM

## 2019-01-18 DIAGNOSIS — R06.09 DOE (DYSPNEA ON EXERTION): ICD-10-CM

## 2019-01-18 DIAGNOSIS — E78.2 HYPERLIPEMIA, MIXED: ICD-10-CM

## 2019-01-22 ENCOUNTER — OFFICE VISIT (OUTPATIENT)
Dept: CARDIOLOGY CLINIC | Age: 59
End: 2019-01-22

## 2019-01-22 ENCOUNTER — TELEPHONE (OUTPATIENT)
Dept: CARDIOLOGY CLINIC | Age: 59
End: 2019-01-22

## 2019-01-22 ENCOUNTER — CLINICAL SUPPORT (OUTPATIENT)
Dept: CARDIOLOGY CLINIC | Age: 59
End: 2019-01-22

## 2019-01-22 VITALS
BODY MASS INDEX: 28.84 KG/M2 | DIASTOLIC BLOOD PRESSURE: 80 MMHG | HEIGHT: 68 IN | RESPIRATION RATE: 16 BRPM | HEART RATE: 76 BPM | OXYGEN SATURATION: 98 % | SYSTOLIC BLOOD PRESSURE: 150 MMHG | WEIGHT: 190.3 LBS

## 2019-01-22 DIAGNOSIS — J44.9 CHRONIC OBSTRUCTIVE PULMONARY DISEASE, UNSPECIFIED COPD TYPE (HCC): ICD-10-CM

## 2019-01-22 DIAGNOSIS — I73.9 CLAUDICATION OF BOTH LOWER EXTREMITIES (HCC): ICD-10-CM

## 2019-01-22 DIAGNOSIS — F17.210 MODERATE CIGARETTE SMOKER (10-19 PER DAY): ICD-10-CM

## 2019-01-22 DIAGNOSIS — I10 HYPERTENSION, ESSENTIAL: ICD-10-CM

## 2019-01-22 DIAGNOSIS — Z72.0 TOBACCO USE: ICD-10-CM

## 2019-01-22 DIAGNOSIS — I10 HYPERTENSION, ESSENTIAL: Primary | ICD-10-CM

## 2019-01-22 DIAGNOSIS — M79.605 PAIN IN BOTH LOWER EXTREMITIES: Primary | ICD-10-CM

## 2019-01-22 DIAGNOSIS — I42.9 CARDIOMYOPATHY, UNSPECIFIED TYPE (HCC): ICD-10-CM

## 2019-01-22 DIAGNOSIS — R94.39 ABNORMAL STRESS TEST: ICD-10-CM

## 2019-01-22 DIAGNOSIS — E78.2 HYPERLIPEMIA, MIXED: ICD-10-CM

## 2019-01-22 DIAGNOSIS — M79.604 PAIN IN BOTH LOWER EXTREMITIES: Primary | ICD-10-CM

## 2019-01-22 DIAGNOSIS — R06.09 DOE (DYSPNEA ON EXERTION): Primary | ICD-10-CM

## 2019-01-22 NOTE — PROGRESS NOTES
1/22/2019 11:17 AM      Subjective: Valdo Hammond is here for f/u visit. Continues to c/o worsening GARCIA. High risk scan with cardiomyopathy noted. Also c/o limiting b/l LE claudication. He denies palpitations, irregular heart beats, near-syncope, syncope, orthopnea, paroxysmal nocturnal dyspnea, lower extremity edema. Visit Vitals  /80 (BP 1 Location: Right arm, BP Patient Position: Sitting)   Pulse 76   Resp 16   Ht 5' 8\" (1.727 m)   Wt 190 lb 4.8 oz (86.3 kg)   SpO2 98%   BMI 28.94 kg/m²     Current Outpatient Medications   Medication Sig    isosorbide mononitrate ER (IMDUR) 30 mg tablet Take 1 Tab by mouth daily.  DULoxetine (CYMBALTA) 60 mg capsule Take 60 mg by mouth daily.  metoprolol succinate (TOPROL-XL) 25 mg XL tablet Take 1 Tab by mouth nightly.  rosuvastatin (CRESTOR) 40 mg tablet Take 1 Tab by mouth nightly.  aspirin delayed-release 81 mg tablet Take 1 Tab by mouth daily.  nitroglycerin (NITROSTAT) 0.4 mg SL tablet 1 Tab by SubLINGual route every five (5) minutes as needed for Chest Pain. Up to 3 doses.  omeprazole (PRILOSEC) 40 mg capsule Take 1 Cap by mouth two (2) times a day.  busPIRone (BUSPAR) 5 mg tablet Take 1 Tab by mouth three (3) times daily (with meals).  lisinopril-hydroCHLOROthiazide (PRINZIDE, ZESTORETIC) 20-12.5 mg per tablet Take 2 Tabs by mouth daily.  calcium carbonate (TUMS) 200 mg calcium (500 mg) chew Take 1 Tab by mouth daily.  methocarbamol (ROBAXIN-750) 750 mg tablet Take 1 Tab by mouth four (4) times daily as needed. (Patient not taking: Reported on 1/22/2019)    oxyCODONE IR (ROXICODONE) 5 mg immediate release tablet Take 1 Tab by mouth every four (4) hours as needed for Pain. Max Daily Amount: 30 mg. (Patient not taking: Reported on 1/22/2019)    DULoxetine (CYMBALTA) 30 mg capsule Take 30 mg by mouth daily.     albuterol (PROVENTIL HFA, VENTOLIN HFA, PROAIR HFA) 90 mcg/actuation inhaler Take 1 Puff by inhalation every four (4) hours as needed for Wheezing. (Patient not taking: Reported on 1/22/2019)    acetaminophen (TYLENOL EXTRA STRENGTH) 500 mg tablet Take  by mouth every six (6) hours as needed for Pain. No current facility-administered medications for this visit. Objective:      Visit Vitals  /80 (BP 1 Location: Right arm, BP Patient Position: Sitting)   Pulse 76   Resp 16   Ht 5' 8\" (1.727 m)   Wt 190 lb 4.8 oz (86.3 kg)   SpO2 98%   BMI 28.94 kg/m²       Data Review:     Reviewed and/or ordered active problem list, medication list tests    Past Medical History:   Diagnosis Date    Adrenal mass, right ([address] 2014    Seen on abd CT    CAD (coronary artery disease)     calcific atherosclerosis on CT    Depression     Hypercholesterolemia     Hypertension     Multiple lung nodules       Past Surgical History:   Procedure Laterality Date    HX ORTHOPAEDIC      hand    HX ORTHOPAEDIC      foot     Allergies   Allergen Reactions    Advil [Ibuprofen] Hives    Duloxetine Diarrhea    Fluoxetine Diarrhea    Lovastatin Diarrhea    Tylenol [Acetaminophen] Hives      Family History   Problem Relation Age of Onset    Heart Disease Mother     Asthma Mother     Elevated Lipids Mother     Hypertension Mother       Social History     Socioeconomic History    Marital status: SINGLE     Spouse name: Not on file    Number of children: Not on file    Years of education: Not on file    Highest education level: Not on file   Social Needs    Financial resource strain: Not on file    Food insecurity - worry: Not on file    Food insecurity - inability: Not on file   [employer] needs - medical: Not on file   [employer] needs - non-medical: Not on file   Occupational History    Not on file   Tobacco Use    Smoking status: Current Every Day Smoker     Packs/day: 1.00     Types: Cigarettes    Smokeless tobacco: Never Used   Substance and Sexual Activity    Alcohol use:  No Alcohol/week: 0.0 oz     Comment: stopped jan 2014    Drug use: No    Sexual activity: Not on file   Other Topics Concern    Not on file   Social History Narrative    Not on file         Review of Systems     General: Not Present- Anorexia, Chills, Dietary Changes, Fatigue, Fever, Medication Changes, Night Sweats, Weight Gain > 10lbs. and Weight Loss > 10lbs. .  Skin: Not Present- Bruising and Excessive Sweating. HEENT: Not Present- Headache, Visual Loss and Vertigo. Respiratory: Not Present- Cough, Snoring and Wheezing. Cardiovascular: Not Present- Abnormal Blood Pressure, Chest Pain, Edema, Fainting / Blacking Out, Irregular Heart Beat, Orthopnea, Palpitations, Paroxysmal Nocturnal Dyspnea, Rapid Heart Rate, and Swelling of Extremities. Gastrointestinal: Not Present- Black, Tarry Stool, Bloody Stool, Diarrhea, Hematemesis, Rectal Bleeding and Vomiting. Musculoskeletal: Not Present- Muscle Pain and Muscle Weakness. Neurological: Not Present- Dizziness. Psychiatric: Not Present- Depression. Endocrine: Not Present- Cold Intolerance, Heat Intolerance and Thyroid Problems. Hematology: Not Present- Abnormal Bleeding, Anemia, Blood Clots and Easy Bruising.       Physical Exam   The physical exam findings are as follows:       General   Mental Status - Alert. General Appearance - Not in acute distress. Chest and Lung Exam   Inspection: Accessory muscles - No use of accessory muscles in breathing. Auscultation:   Breath sounds: - Normal.      Cardiovascular   Inspection: Jugular vein - Bilateral - Inspection Normal.  Palpation/Percussion:   Apical Impulse: - Normal.  Auscultation: Rhythm - Regular. Heart Sounds - S1 WNL and S2 WNL. No S3 or S4. Murmurs & Other Heart Sounds: Auscultation of the heart reveals - No Murmurs. Carotid arteries - No Carotid bruit. Peripheral Vascular   Upper Extremity: Inspection - Bilateral - No Cyanotic nailbeds or Digital clubbing.   Lower Extremity:   Palpation: Dorsalis pedis pulse - Bilateral - NP. Posterior tibia pulse - Bilateral - NP. Edema - Bilateral - No edema. Assessment:       ICD-10-CM ICD-9-CM    1. Hypertension, essential I10 401.9 CBC W/O DIFF      METABOLIC PANEL, COMPREHENSIVE      PROTHROMBIN TIME + INR      ANKLE BRACHIAL INDEX      CARDIAC CATHETERIZATION      IR ANGIO EXT LOWER BI   2. Chronic obstructive pulmonary disease, unspecified COPD type ([facility]) J44.9 496 CBC W/O DIFF      METABOLIC PANEL, COMPREHENSIVE      PROTHROMBIN TIME + INR      ANKLE BRACHIAL INDEX      CARDIAC CATHETERIZATION      IR ANGIO EXT LOWER BI   3. Tobacco use Z72.0 305.1 CBC W/O DIFF      METABOLIC PANEL, COMPREHENSIVE      PROTHROMBIN TIME + INR      ANKLE BRACHIAL INDEX      CARDIAC CATHETERIZATION      IR ANGIO EXT LOWER BI   4. Hyperlipemia, mixed E78.2 272.2 CBC W/O DIFF      METABOLIC PANEL, COMPREHENSIVE      PROTHROMBIN TIME + INR      ANKLE BRACHIAL INDEX      CARDIAC CATHETERIZATION      IR ANGIO EXT LOWER BI   5. Abnormal stress test R94.39 794.39 CBC W/O DIFF      METABOLIC PANEL, COMPREHENSIVE      PROTHROMBIN TIME + INR      ANKLE BRACHIAL INDEX      CARDIAC CATHETERIZATION      IR ANGIO EXT LOWER BI   6. Cardiomyopathy, unspecified type ([facility] [address] I42.9 425.4 CBC W/O DIFF      METABOLIC PANEL, COMPREHENSIVE      PROTHROMBIN TIME + INR      ANKLE BRACHIAL INDEX      CARDIAC CATHETERIZATION      IR ANGIO EXT LOWER BI   7. Claudication of both lower extremities (formerly [facility]) I73.9 443.9 CBC W/O DIFF      METABOLIC PANEL, COMPREHENSIVE      PROTHROMBIN TIME + INR      ANKLE BRACHIAL INDEX      CARDIAC CATHETERIZATION      IR ANGIO EXT LOWER BI       Plan:     1. GARCIA: Anginal equivalent. Stress test noted. On dual anti-ischemic. Recommend cardiac cath. I discussed the risks/benefits/alternatives of the procedure with the patient. Risks include (but are not limited to) bleeding, infection, cva/mi/tamponade/death.  The patient understands and agrees to proceed. 2. Hypertension: continue current meds  3. Hyperlipidemia: On statin therapy  4. Tobacco abuse: Counseled on smoking cessation. 5. LE claudication: check EDWINA. Will proceed with angio at time of cardiac cath.

## 2019-01-22 NOTE — PROGRESS NOTES
1. Have you been to the ER, urgent care clinic since your last visit? Hospitalized since your last visit? No    2. Have you seen or consulted any other health care providers outside of the [address] [place] [address] since your last visit? Include any pap smears or colon screening. No     Chief Complaint   Patient presents with    Results     SOB    Patient here for review of recent testing very winded with activity and the cold air.

## 2019-01-23 ENCOUNTER — HOSPITAL ENCOUNTER (INPATIENT)
Dept: CARDIAC CATH/INVASIVE PROCEDURES | Age: 59
LOS: 3 days | Discharge: HOME | DRG: 175 | End: 2019-01-26
Attending: INTERNAL MEDICINE | Admitting: INTERNAL MEDICINE
Payer: MEDICAID

## 2019-01-23 PROBLEM — I25.10 CAD (CORONARY ARTERY DISEASE): Status: ACTIVE | Noted: 2019-01-23

## 2019-01-23 LAB
ACT BLD: 153 SECS (ref 79–138)
ALBUMIN SERPL-MCNC: 4.7 G/DL (ref 3.5–5.5)
ALBUMIN/GLOB SERPL: 2 {RATIO} (ref 1.2–2.2)
ALP SERPL-CCNC: 103 IU/L (ref 39–117)
ALT SERPL-CCNC: 19 IU/L (ref 0–44)
AST SERPL-CCNC: 17 IU/L (ref 0–40)
BILIRUB SERPL-MCNC: 0.2 MG/DL (ref 0–1.2)
BUN SERPL-MCNC: 13 MG/DL (ref 6–24)
BUN/CREAT SERPL: 17 (ref 9–20)
CALCIUM SERPL-MCNC: 9.8 MG/DL (ref 8.7–10.2)
CHLORIDE SERPL-SCNC: 102 MMOL/L (ref 96–106)
CO2 SERPL-SCNC: 24 MMOL/L (ref 20–29)
CREAT SERPL-MCNC: 0.75 MG/DL (ref 0.76–1.27)
ERYTHROCYTE [DISTWIDTH] IN BLOOD BY AUTOMATED COUNT: 14.7 % (ref 12.3–15.4)
GLOBULIN SER CALC-MCNC: 2.4 G/DL (ref 1.5–4.5)
GLUCOSE SERPL-MCNC: 114 MG/DL (ref 65–99)
HCT VFR BLD AUTO: 40 % (ref 37.5–51)
HGB BLD-MCNC: 13.2 G/DL (ref 13–17.7)
INR PPP: 1.1 (ref 0.8–1.2)
MCH RBC QN AUTO: 33.2 PG (ref 26.6–33)
MCHC RBC AUTO-ENTMCNC: 33 G/DL (ref 31.5–35.7)
MCV RBC AUTO: 101 FL (ref 79–97)
PLATELET # BLD AUTO: 266 X10E3/UL (ref 150–379)
POTASSIUM SERPL-SCNC: 4.4 MMOL/L (ref 3.5–5.2)
PROT SERPL-MCNC: 7.1 G/DL (ref 6–8.5)
PROTHROMBIN TIME: 11.2 SEC (ref 9.1–12)
RBC # BLD AUTO: 3.97 X10E6/UL (ref 4.14–5.8)
SODIUM SERPL-SCNC: 141 MMOL/L (ref 134–144)
WBC # BLD AUTO: 6.5 X10E3/UL (ref 3.4–10.8)

## 2019-01-23 PROCEDURE — C1769 GUIDE WIRE: HCPCS

## 2019-01-23 PROCEDURE — 74011250636 HC RX REV CODE- 250/636

## 2019-01-23 PROCEDURE — C1887 CATHETER, GUIDING: HCPCS

## 2019-01-23 PROCEDURE — 77030010852 HC CATH NB ADVNTG COOK -B

## 2019-01-23 PROCEDURE — C1874 STENT, COATED/COV W/DEL SYS: HCPCS

## 2019-01-23 PROCEDURE — B2111ZZ FLUOROSCOPY OF MULTIPLE CORONARY ARTERIES USING LOW OSMOLAR CONTRAST: ICD-10-PCS | Performed by: INTERNAL MEDICINE

## 2019-01-23 PROCEDURE — C1894 INTRO/SHEATH, NON-LASER: HCPCS

## 2019-01-23 PROCEDURE — 4A023N7 MEASUREMENT OF CARDIAC SAMPLING AND PRESSURE, LEFT HEART, PERCUTANEOUS APPROACH: ICD-10-PCS | Performed by: INTERNAL MEDICINE

## 2019-01-23 PROCEDURE — 77030002996 HC SUT SLK J&J -A

## 2019-01-23 PROCEDURE — 77030019697 HC SYR ANGI INFL MRTM -B

## 2019-01-23 PROCEDURE — 74011250637 HC RX REV CODE- 250/637: Performed by: INTERNAL MEDICINE

## 2019-01-23 PROCEDURE — 74011636320 HC RX REV CODE- 636/320

## 2019-01-23 PROCEDURE — B2151ZZ FLUOROSCOPY OF LEFT HEART USING LOW OSMOLAR CONTRAST: ICD-10-PCS | Performed by: INTERNAL MEDICINE

## 2019-01-23 PROCEDURE — 74011250636 HC RX REV CODE- 250/636: Performed by: INTERNAL MEDICINE

## 2019-01-23 PROCEDURE — 74011250637 HC RX REV CODE- 250/637

## 2019-01-23 PROCEDURE — 77030028837 HC SYR ANGI PWR INJ COEU -A

## 2019-01-23 PROCEDURE — 36245 INS CATH ABD/L-EXT ART 1ST: CPT

## 2019-01-23 PROCEDURE — 77030013797 HC KT TRNSDUC PRSSR EDWD -A

## 2019-01-23 PROCEDURE — 027135Z DILATION OF CORONARY ARTERY, TWO ARTERIES WITH TWO DRUG-ELUTING INTRALUMINAL DEVICES, PERCUTANEOUS APPROACH: ICD-10-PCS | Performed by: INTERNAL MEDICINE

## 2019-01-23 PROCEDURE — 65660000000 HC RM CCU STEPDOWN

## 2019-01-23 PROCEDURE — 74011636320 HC RX REV CODE- 636/320: Performed by: INTERNAL MEDICINE

## 2019-01-23 PROCEDURE — C1725 CATH, TRANSLUMIN NON-LASER: HCPCS

## 2019-01-23 PROCEDURE — 85347 COAGULATION TIME ACTIVATED: CPT

## 2019-01-23 PROCEDURE — B41G1ZZ FLUOROSCOPY OF LEFT LOWER EXTREMITY ARTERIES USING LOW OSMOLAR CONTRAST: ICD-10-PCS | Performed by: INTERNAL MEDICINE

## 2019-01-23 RX ORDER — ISOSORBIDE MONONITRATE 30 MG/1
30 TABLET, EXTENDED RELEASE ORAL DAILY
Status: DISCONTINUED | OUTPATIENT
Start: 2019-01-24 | End: 2019-01-26 | Stop reason: HOSPADM

## 2019-01-23 RX ORDER — HEPARIN SODIUM 200 [USP'U]/100ML
500 INJECTION, SOLUTION INTRAVENOUS ONCE
Status: COMPLETED | OUTPATIENT
Start: 2019-01-23 | End: 2019-01-23

## 2019-01-23 RX ORDER — HEPARIN SODIUM 1000 [USP'U]/ML
8000 INJECTION, SOLUTION INTRAVENOUS; SUBCUTANEOUS ONCE
Status: COMPLETED | OUTPATIENT
Start: 2019-01-23 | End: 2019-01-23

## 2019-01-23 RX ORDER — METOPROLOL SUCCINATE 25 MG/1
25 TABLET, EXTENDED RELEASE ORAL
Status: DISCONTINUED | OUTPATIENT
Start: 2019-01-23 | End: 2019-01-25

## 2019-01-23 RX ORDER — IODIXANOL 320 MG/ML
0-100 INJECTION, SOLUTION INTRAVASCULAR
Status: DISCONTINUED | OUTPATIENT
Start: 2019-01-23 | End: 2019-01-23

## 2019-01-23 RX ORDER — LIDOCAINE HYDROCHLORIDE 10 MG/ML
1-30 INJECTION, SOLUTION EPIDURAL; INFILTRATION; INTRACAUDAL; PERINEURAL
Status: DISCONTINUED | OUTPATIENT
Start: 2019-01-23 | End: 2019-01-23

## 2019-01-23 RX ORDER — IODIXANOL 320 MG/ML
INJECTION, SOLUTION INTRAVASCULAR
Status: COMPLETED
Start: 2019-01-23 | End: 2019-01-23

## 2019-01-23 RX ORDER — MIDAZOLAM HYDROCHLORIDE 1 MG/ML
INJECTION, SOLUTION INTRAMUSCULAR; INTRAVENOUS
Status: COMPLETED
Start: 2019-01-23 | End: 2019-01-23

## 2019-01-23 RX ORDER — ALBUTEROL SULFATE 90 UG/1
1 AEROSOL, METERED RESPIRATORY (INHALATION)
Status: DISCONTINUED | OUTPATIENT
Start: 2019-01-23 | End: 2019-01-26 | Stop reason: HOSPADM

## 2019-01-23 RX ORDER — PANTOPRAZOLE SODIUM 40 MG/1
40 TABLET, DELAYED RELEASE ORAL 2 TIMES DAILY
Status: DISCONTINUED | OUTPATIENT
Start: 2019-01-23 | End: 2019-01-26 | Stop reason: HOSPADM

## 2019-01-23 RX ORDER — GUAIFENESIN 100 MG/5ML
81 LIQUID (ML) ORAL ONCE
Status: COMPLETED | OUTPATIENT
Start: 2019-01-23 | End: 2019-01-23

## 2019-01-23 RX ORDER — LIDOCAINE HYDROCHLORIDE 10 MG/ML
INJECTION, SOLUTION EPIDURAL; INFILTRATION; INTRACAUDAL; PERINEURAL
Status: COMPLETED
Start: 2019-01-23 | End: 2019-01-23

## 2019-01-23 RX ORDER — DULOXETIN HYDROCHLORIDE 30 MG/1
60 CAPSULE, DELAYED RELEASE ORAL DAILY
Status: DISCONTINUED | OUTPATIENT
Start: 2019-01-24 | End: 2019-01-26 | Stop reason: HOSPADM

## 2019-01-23 RX ORDER — HEPARIN SODIUM 200 [USP'U]/100ML
INJECTION, SOLUTION INTRAVENOUS
Status: COMPLETED
Start: 2019-01-23 | End: 2019-01-23

## 2019-01-23 RX ORDER — FENTANYL CITRATE 50 UG/ML
25-50 INJECTION, SOLUTION INTRAMUSCULAR; INTRAVENOUS
Status: DISCONTINUED | OUTPATIENT
Start: 2019-01-23 | End: 2019-01-23

## 2019-01-23 RX ORDER — ASPIRIN 81 MG/1
81 TABLET ORAL DAILY
Status: DISCONTINUED | OUTPATIENT
Start: 2019-01-23 | End: 2019-01-26 | Stop reason: HOSPADM

## 2019-01-23 RX ORDER — MIDAZOLAM HYDROCHLORIDE 1 MG/ML
.5-2 INJECTION, SOLUTION INTRAMUSCULAR; INTRAVENOUS
Status: DISCONTINUED | OUTPATIENT
Start: 2019-01-23 | End: 2019-01-23

## 2019-01-23 RX ORDER — ACETAMINOPHEN 325 MG/1
325 TABLET ORAL
Status: DISCONTINUED | OUTPATIENT
Start: 2019-01-23 | End: 2019-01-23

## 2019-01-23 RX ORDER — ATORVASTATIN CALCIUM 40 MG/1
80 TABLET, FILM COATED ORAL
Status: DISCONTINUED | OUTPATIENT
Start: 2019-01-23 | End: 2019-01-26 | Stop reason: HOSPADM

## 2019-01-23 RX ORDER — GUAIFENESIN 100 MG/5ML
LIQUID (ML) ORAL
Status: COMPLETED
Start: 2019-01-23 | End: 2019-01-23

## 2019-01-23 RX ORDER — FENTANYL CITRATE 50 UG/ML
INJECTION, SOLUTION INTRAMUSCULAR; INTRAVENOUS
Status: COMPLETED
Start: 2019-01-23 | End: 2019-01-23

## 2019-01-23 RX ORDER — OXYCODONE HYDROCHLORIDE 5 MG/1
5 TABLET ORAL
Status: DISCONTINUED | OUTPATIENT
Start: 2019-01-23 | End: 2019-01-26 | Stop reason: HOSPADM

## 2019-01-23 RX ORDER — HEPARIN SODIUM 1000 [USP'U]/ML
INJECTION, SOLUTION INTRAVENOUS; SUBCUTANEOUS
Status: COMPLETED
Start: 2019-01-23 | End: 2019-01-23

## 2019-01-23 RX ORDER — SODIUM CHLORIDE 9 MG/ML
100 INJECTION, SOLUTION INTRAVENOUS CONTINUOUS
Status: DISPENSED | OUTPATIENT
Start: 2019-01-23 | End: 2019-01-24

## 2019-01-23 RX ADMIN — MIDAZOLAM HYDROCHLORIDE 2 MG: 1 INJECTION, SOLUTION INTRAMUSCULAR; INTRAVENOUS at 14:50

## 2019-01-23 RX ADMIN — ATORVASTATIN CALCIUM 80 MG: 40 TABLET, FILM COATED ORAL at 21:47

## 2019-01-23 RX ADMIN — FENTANYL CITRATE 25 MCG: 50 INJECTION, SOLUTION INTRAMUSCULAR; INTRAVENOUS at 14:50

## 2019-01-23 RX ADMIN — FENTANYL CITRATE 25 MCG: 50 INJECTION, SOLUTION INTRAMUSCULAR; INTRAVENOUS at 15:28

## 2019-01-23 RX ADMIN — HEPARIN SODIUM 1000 UNITS: 200 INJECTION, SOLUTION INTRAVENOUS at 14:36

## 2019-01-23 RX ADMIN — FENTANYL CITRATE 25 MCG: 50 INJECTION, SOLUTION INTRAMUSCULAR; INTRAVENOUS at 15:07

## 2019-01-23 RX ADMIN — MIDAZOLAM HYDROCHLORIDE 1 MG: 1 INJECTION, SOLUTION INTRAMUSCULAR; INTRAVENOUS at 15:13

## 2019-01-23 RX ADMIN — Medication 81 MG: at 14:22

## 2019-01-23 RX ADMIN — MIDAZOLAM HYDROCHLORIDE 1 MG: 1 INJECTION, SOLUTION INTRAMUSCULAR; INTRAVENOUS at 15:07

## 2019-01-23 RX ADMIN — IODIXANOL 80 ML: 320 INJECTION, SOLUTION INTRAVASCULAR at 15:06

## 2019-01-23 RX ADMIN — IODIXANOL 100 ML: 320 INJECTION, SOLUTION INTRAVASCULAR at 15:27

## 2019-01-23 RX ADMIN — PANTOPRAZOLE SODIUM 40 MG: 40 TABLET, DELAYED RELEASE ORAL at 21:48

## 2019-01-23 RX ADMIN — TICAGRELOR 180 MG: 90 TABLET ORAL at 15:45

## 2019-01-23 RX ADMIN — MIDAZOLAM HYDROCHLORIDE 1 MG: 1 INJECTION, SOLUTION INTRAMUSCULAR; INTRAVENOUS at 15:28

## 2019-01-23 RX ADMIN — IODIXANOL 100 ML: 320 INJECTION, SOLUTION INTRAVASCULAR at 15:44

## 2019-01-23 RX ADMIN — LIDOCAINE HYDROCHLORIDE 10 ML: 10 INJECTION, SOLUTION EPIDURAL; INFILTRATION; INTRACAUDAL; PERINEURAL at 14:56

## 2019-01-23 RX ADMIN — HEPARIN SODIUM 8000 UNITS: 1000 INJECTION, SOLUTION INTRAVENOUS; SUBCUTANEOUS at 15:14

## 2019-01-23 RX ADMIN — OXYCODONE HYDROCHLORIDE 5 MG: 5 TABLET ORAL at 19:29

## 2019-01-23 RX ADMIN — ASPIRIN 81 MG 81 MG: 81 TABLET ORAL at 14:22

## 2019-01-23 RX ADMIN — HEPARIN SODIUM 1000 UNITS: 200 INJECTION, SOLUTION INTRAVENOUS at 14:35

## 2019-01-23 RX ADMIN — FENTANYL CITRATE 25 MCG: 50 INJECTION, SOLUTION INTRAMUSCULAR; INTRAVENOUS at 15:13

## 2019-01-24 LAB — ACT BLD: 257 SECS (ref 79–138)

## 2019-01-24 PROCEDURE — 65660000000 HC RM CCU STEPDOWN

## 2019-01-24 PROCEDURE — 74011250637 HC RX REV CODE- 250/637: Performed by: INTERNAL MEDICINE

## 2019-01-24 PROCEDURE — 74011250637 HC RX REV CODE- 250/637

## 2019-01-24 RX ORDER — BISMUTH SUBSALICYLATE 262 MG/15ML
30 LIQUID ORAL
Status: DISCONTINUED | OUTPATIENT
Start: 2019-01-24 | End: 2019-01-26 | Stop reason: HOSPADM

## 2019-01-24 RX ADMIN — LISINOPRIL: 20 TABLET ORAL at 11:19

## 2019-01-24 RX ADMIN — OXYCODONE HYDROCHLORIDE 5 MG: 5 TABLET ORAL at 07:18

## 2019-01-24 RX ADMIN — DULOXETINE 60 MG: 30 CAPSULE, DELAYED RELEASE ORAL at 08:58

## 2019-01-24 RX ADMIN — ASPIRIN 81 MG: 81 TABLET, COATED ORAL at 08:58

## 2019-01-24 RX ADMIN — TICAGRELOR 90 MG: 90 TABLET ORAL at 03:47

## 2019-01-24 RX ADMIN — OXYCODONE HYDROCHLORIDE 5 MG: 5 TABLET ORAL at 17:46

## 2019-01-24 RX ADMIN — TICAGRELOR 90 MG: 90 TABLET ORAL at 16:10

## 2019-01-24 RX ADMIN — PANTOPRAZOLE SODIUM 40 MG: 40 TABLET, DELAYED RELEASE ORAL at 20:39

## 2019-01-24 RX ADMIN — ISOSORBIDE MONONITRATE 30 MG: 30 TABLET, EXTENDED RELEASE ORAL at 08:58

## 2019-01-24 RX ADMIN — ATORVASTATIN CALCIUM 80 MG: 40 TABLET, FILM COATED ORAL at 20:39

## 2019-01-24 RX ADMIN — PANTOPRAZOLE SODIUM 40 MG: 40 TABLET, DELAYED RELEASE ORAL at 08:58

## 2019-01-24 NOTE — PROGRESS NOTES
Bedside and Verbal shift change report given to Demond Loomis RN (oncoming nurse) by Chad Mak RN (offgoing nurse). Report included the following information SBAR, Kardex, Procedure Summary, Intake/Output, MAR, Accordion, Recent Results and Cardiac Rhythm NSR/SB.

## 2019-01-24 NOTE — PROGRESS NOTES
Reason for Admission:   CAD 
                
RRAT Score:       11 Plan for utilizing home health:      tbd Likelihood of Readmission: Mod to high Transition of Care Plan:      [facility] if appropriate. Yet to be determined. Follow up appointments CM met with patient to discuss discharge planning. Pt confirmed name and  as patient identifiers. Demographics confirmed. Patient is single and lives alone in a single story home, 3 steps entry. Patient reports being able to ambulate steps independently prior to admission. ADL's/IADL's - independent - does not drive. Sister provides transportation to/from appointments DME - cane and walker HH/SNF - no previous experience Patient provided alternate contact number for Betty Ka - [phone] Patient has a ride at time of discharge. Patient inquired about HH. Yet to be determined if there is a need. Care Management Interventions PCP Verified by CM: Yes Mode of Transport at Discharge: Other (see comment)(family) Transition of Care Consult (CM Consult): Discharge Planning Discharge Durable Medical Equipment: No 
Physical Therapy Consult: No 
Occupational Therapy Consult: No 
Speech Therapy Consult: No 
Current Support Network: Lives Alone Confirm Follow Up Transport: Other (see comment)(Medicaid) Plan discussed with Pt/Family/Caregiver: Yes Discharge Location Discharge Placement: Home Patient is alert and oriented; on room air. Roxanna Ribera RN CM Ext [phone]

## 2019-01-24 NOTE — PROGRESS NOTES
6fr sheath removed from the right femoral artery, manual pressure and quickclot held for 20 min upon release no oozing or hematoma.  Groin inspected and care taken over by FORD Junior

## 2019-01-24 NOTE — CARDIO/PULMONARY
C/p Rehab note- chart reviewed. S/P PCI/stenting today 1/24/2019. Pt states he is to have second procedure tomorrow morning. HX includes COPD,Depression,HLD,HTN, Tobacco abuse, Cardiomyopathy, claudication of both extremities, multiple lung nodules. Current everyday smoker. Smoking cessation info added to discharge instructions. CAD education folder given to Aspen Nowak. Educated using teach back method. Reviewed CAD diagnosis definition and purpose of intervention. Discussed risk factors for CAD to include the following: family history,  hyperlipidemia, hypertension, smoking. Smoking Cessation Program link added to AVS. Discussed Heart Healthy/Low Sodium (2000 mg) diet. Reviewed the importance of medication compliance, the purpose of Brilinta, and potential side effects. Discussed follow up appointments with cardiologist, signs and symptoms of angina, and what to report to physician after discharge. Emphasized the value of cardiac rehab. Discussed Cardiac Rehab Program format, benefits, and encouraged enrollment to assist with risk modification and management. Initial Cardiac Rehab Program intake appointment scheduled for March 11.2019 at 0900 and appointment information is on the AVS. 
 
Aspen Nowak verbalized understanding with questions answered.  Ryann Gallo RN

## 2019-01-24 NOTE — PROGRESS NOTES
1/24/2019 12:31 PM 
 
Admit Date: 1/23/2019 Admit Diagnosis: cath;CAD (coronary artery disease) Subjective: Yarelis Chairez denies chest pain, chest pressure/discomfort, palpitations, irregular heart beats, near-syncope, syncope, fatigue, exertional chest pressure/discomfort, lower extremity edema. Breathing better after PCI. B/l LE claudication. Visit Vitals /63 Pulse 62 Temp 97.4 °F (36.3 °C) Resp 16 Ht 5' 10\" (1.778 m) Wt 185 lb (83.9 kg) SpO2 97% BMI 26.54 kg/m² Current Facility-Administered Medications Medication Dose Route Frequency  aspirin delayed-release tablet 81 mg  81 mg Oral DAILY  ticagrelor (BRILINTA) tablet 90 mg  90 mg Oral Q12H  
 albuterol (PROVENTIL HFA, VENTOLIN HFA, PROAIR HFA) inhaler 1 Puff  1 Puff Inhalation Q4H PRN  
 DULoxetine (CYMBALTA) capsule 60 mg  60 mg Oral DAILY  isosorbide mononitrate ER (IMDUR) tablet 30 mg  30 mg Oral DAILY  lisinopril (PRINIVIL, ZESTRIL) 20 mg, hydroCHLOROthiazide (MICROZIDE) 12.5 mg   Oral DAILY  metoprolol succinate (TOPROL-XL) XL tablet 25 mg  25 mg Oral QHS  pantoprazole (PROTONIX) tablet 40 mg  40 mg Oral BID  atorvastatin (LIPITOR) tablet 80 mg  80 mg Oral QHS  oxyCODONE IR (ROXICODONE) tablet 5 mg  5 mg Oral Q4H PRN Objective:  
  
Visit Vitals /63 Pulse 62 Temp 97.4 °F (36.3 °C) Resp 16 Ht 5' 10\" (1.778 m) Wt 185 lb (83.9 kg) SpO2 97% BMI 26.54 kg/m² Physical Exam: Abdomen: soft, non-tender. Bowel sounds normal.  
Extremities: no cyanosis or edema Heart: regular rate and rhythm, S1, S2 normal, no murmur, click, rub or gallop Lungs: clear to auscultation bilaterally Neurologic: Grossly normal 
Pulses: NP DP and PT Data Review:  
Labs:   
Recent Results (from the past 24 hour(s)) POC ACTIVATED CLOTTING TIME Collection Time: 01/23/19  5:51 PM  
Result Value Ref Range  Activated Clotting Time (POC) 153 (H) 79 - 138 SECS  
 
 
 Telemetry: normal sinus rhythm Assessment:  
 
Active Problems: Hypertension, essential (8/8/2016) Tobacco use (11/15/2018) Cardiomyopathy ([facility] [address] (1/22/2019) Claudication of both lower extremities ([facility] [address] (1/22/2019) CAD (coronary artery disease) (1/23/2019) Plan: 1. Multivessel CAD: s/p LAD and Cx stent. For RCA PCI tomorrow. Continue current meds. 2. PVD: will address in few weeks after recovering from above. 3. HTN: BP controlled. 4. Smoking cessation.

## 2019-01-24 NOTE — PROGRESS NOTES
Received shift change report at bedside from [facility]. Pt. A&O x 4, asymptomatic, stable vital sings. Pt. Reported having loose stool 6-7 times since the morning. Pt. With chronic diarrhea for the past 5 years and usually take pepto bismol and Tums for his stomach discomfort. RN called Dr. Melodi Landau, but unable to reach him. Then, RN proceeded to called Rita Luque NP and notified her of pt. Condition.

## 2019-01-25 PROBLEM — Z98.890 S/P CARDIAC CATH: Status: ACTIVE | Noted: 2019-01-25

## 2019-01-25 PROCEDURE — 74011250636 HC RX REV CODE- 250/636

## 2019-01-25 PROCEDURE — 74011000250 HC RX REV CODE- 250

## 2019-01-25 PROCEDURE — C1769 GUIDE WIRE: HCPCS

## 2019-01-25 PROCEDURE — 92928 PRQ TCAT PLMT NTRAC ST 1 LES: CPT

## 2019-01-25 PROCEDURE — C1894 INTRO/SHEATH, NON-LASER: HCPCS

## 2019-01-25 PROCEDURE — 74011250636 HC RX REV CODE- 250/636: Performed by: INTERNAL MEDICINE

## 2019-01-25 PROCEDURE — 74011250637 HC RX REV CODE- 250/637: Performed by: INTERNAL MEDICINE

## 2019-01-25 PROCEDURE — 02703ZZ DILATION OF CORONARY ARTERY, ONE ARTERY, PERCUTANEOUS APPROACH: ICD-10-PCS | Performed by: INTERNAL MEDICINE

## 2019-01-25 PROCEDURE — 74011636320 HC RX REV CODE- 636/320

## 2019-01-25 PROCEDURE — 77030008543 HC TBNG MON PRSS MRTM -A

## 2019-01-25 PROCEDURE — 65660000000 HC RM CCU STEPDOWN

## 2019-01-25 PROCEDURE — 77030019698 HC SYR ANGI MDLON MRTM -A

## 2019-01-25 PROCEDURE — C1887 CATHETER, GUIDING: HCPCS

## 2019-01-25 PROCEDURE — C1725 CATH, TRANSLUMIN NON-LASER: HCPCS

## 2019-01-25 PROCEDURE — 027034Z DILATION OF CORONARY ARTERY, ONE ARTERY WITH DRUG-ELUTING INTRALUMINAL DEVICE, PERCUTANEOUS APPROACH: ICD-10-PCS | Performed by: INTERNAL MEDICINE

## 2019-01-25 PROCEDURE — 77030019569 HC BND COMPR RAD TERU -B

## 2019-01-25 PROCEDURE — 77030010221 HC SPLNT WR POS TELE -B

## 2019-01-25 PROCEDURE — 74011250637 HC RX REV CODE- 250/637: Performed by: NURSE PRACTITIONER

## 2019-01-25 PROCEDURE — C1874 STENT, COATED/COV W/DEL SYS: HCPCS

## 2019-01-25 PROCEDURE — B2101ZZ FLUOROSCOPY OF SINGLE CORONARY ARTERY USING LOW OSMOLAR CONTRAST: ICD-10-PCS | Performed by: INTERNAL MEDICINE

## 2019-01-25 PROCEDURE — 77030019697 HC SYR ANGI INFL MRTM -B

## 2019-01-25 RX ORDER — LIDOCAINE HYDROCHLORIDE 10 MG/ML
INJECTION, SOLUTION EPIDURAL; INFILTRATION; INTRACAUDAL; PERINEURAL
Status: COMPLETED
Start: 2019-01-25 | End: 2019-01-25

## 2019-01-25 RX ORDER — FENTANYL CITRATE 50 UG/ML
25-50 INJECTION, SOLUTION INTRAMUSCULAR; INTRAVENOUS
Status: DISCONTINUED | OUTPATIENT
Start: 2019-01-25 | End: 2019-01-25

## 2019-01-25 RX ORDER — METOPROLOL SUCCINATE 25 MG/1
12.5 TABLET, EXTENDED RELEASE ORAL
Qty: 45 TAB | Refills: 4 | Status: SHIPPED | OUTPATIENT
Start: 2019-01-25 | End: 2019-02-07

## 2019-01-25 RX ORDER — SODIUM CHLORIDE 9 MG/ML
100 INJECTION, SOLUTION INTRAVENOUS CONTINUOUS
Status: DISCONTINUED | OUTPATIENT
Start: 2019-01-25 | End: 2019-01-26 | Stop reason: HOSPADM

## 2019-01-25 RX ORDER — BIVALIRUDIN 250 MG/5ML
INJECTION, POWDER, LYOPHILIZED, FOR SOLUTION INTRAVENOUS
Status: DISCONTINUED
Start: 2019-01-25 | End: 2019-01-25

## 2019-01-25 RX ORDER — LIDOCAINE HYDROCHLORIDE 10 MG/ML
1-30 INJECTION, SOLUTION EPIDURAL; INFILTRATION; INTRACAUDAL; PERINEURAL
Status: DISCONTINUED | OUTPATIENT
Start: 2019-01-25 | End: 2019-01-25

## 2019-01-25 RX ORDER — SODIUM CHLORIDE 900 MG/100ML
INJECTION INTRAVENOUS
Status: DISCONTINUED
Start: 2019-01-25 | End: 2019-01-25

## 2019-01-25 RX ORDER — HEPARIN SODIUM 200 [USP'U]/100ML
500 INJECTION, SOLUTION INTRAVENOUS ONCE
Status: COMPLETED | OUTPATIENT
Start: 2019-01-25 | End: 2019-01-25

## 2019-01-25 RX ORDER — MIDAZOLAM HYDROCHLORIDE 1 MG/ML
.5-2 INJECTION, SOLUTION INTRAMUSCULAR; INTRAVENOUS
Status: DISCONTINUED | OUTPATIENT
Start: 2019-01-25 | End: 2019-01-25

## 2019-01-25 RX ORDER — DOPAMINE HYDROCHLORIDE 320 MG/100ML
INJECTION, SOLUTION INTRAVENOUS
Status: COMPLETED
Start: 2019-01-25 | End: 2019-01-25

## 2019-01-25 RX ORDER — HEPARIN SODIUM 1000 [USP'U]/ML
INJECTION, SOLUTION INTRAVENOUS; SUBCUTANEOUS
Status: COMPLETED
Start: 2019-01-25 | End: 2019-01-25

## 2019-01-25 RX ORDER — VERAPAMIL HYDROCHLORIDE 2.5 MG/ML
2.5 INJECTION, SOLUTION INTRAVENOUS ONCE
Status: COMPLETED | OUTPATIENT
Start: 2019-01-25 | End: 2019-01-25

## 2019-01-25 RX ORDER — DOPAMINE HYDROCHLORIDE 320 MG/100ML
0-20 INJECTION, SOLUTION INTRAVENOUS
Status: DISCONTINUED | OUTPATIENT
Start: 2019-01-25 | End: 2019-01-25

## 2019-01-25 RX ORDER — FENTANYL CITRATE 50 UG/ML
INJECTION, SOLUTION INTRAMUSCULAR; INTRAVENOUS
Status: COMPLETED
Start: 2019-01-25 | End: 2019-01-25

## 2019-01-25 RX ORDER — HEPARIN SODIUM 1000 [USP'U]/ML
2500 INJECTION, SOLUTION INTRAVENOUS; SUBCUTANEOUS ONCE
Status: COMPLETED | OUTPATIENT
Start: 2019-01-25 | End: 2019-01-25

## 2019-01-25 RX ORDER — HEPARIN SODIUM 1000 [USP'U]/ML
1000-10000 INJECTION, SOLUTION INTRAVENOUS; SUBCUTANEOUS
Status: DISCONTINUED | OUTPATIENT
Start: 2019-01-25 | End: 2019-01-25

## 2019-01-25 RX ORDER — METOPROLOL SUCCINATE 25 MG/1
12.5 TABLET, EXTENDED RELEASE ORAL
Status: DISCONTINUED | OUTPATIENT
Start: 2019-01-25 | End: 2019-01-26 | Stop reason: HOSPADM

## 2019-01-25 RX ORDER — HEPARIN SODIUM 200 [USP'U]/100ML
INJECTION, SOLUTION INTRAVENOUS
Status: COMPLETED
Start: 2019-01-25 | End: 2019-01-25

## 2019-01-25 RX ORDER — MIDAZOLAM HYDROCHLORIDE 1 MG/ML
INJECTION, SOLUTION INTRAMUSCULAR; INTRAVENOUS
Status: COMPLETED
Start: 2019-01-25 | End: 2019-01-25

## 2019-01-25 RX ORDER — VERAPAMIL HYDROCHLORIDE 2.5 MG/ML
INJECTION, SOLUTION INTRAVENOUS
Status: COMPLETED
Start: 2019-01-25 | End: 2019-01-25

## 2019-01-25 RX ADMIN — SODIUM CHLORIDE 100 ML/HR: 900 INJECTION, SOLUTION INTRAVENOUS at 15:03

## 2019-01-25 RX ADMIN — LISINOPRIL: 20 TABLET ORAL at 08:26

## 2019-01-25 RX ADMIN — BISMUTH SUBSALICYLATE 30 ML: 262 LIQUID ORAL at 16:23

## 2019-01-25 RX ADMIN — SODIUM CHLORIDE 500 ML: 900 INJECTION, SOLUTION INTRAVENOUS at 14:00

## 2019-01-25 RX ADMIN — HEPARIN SODIUM 1000 UNITS: 200 INJECTION, SOLUTION INTRAVENOUS at 13:59

## 2019-01-25 RX ADMIN — VERAPAMIL HYDROCHLORIDE 2.5 MG: 2.5 INJECTION, SOLUTION INTRAVENOUS at 13:55

## 2019-01-25 RX ADMIN — PANTOPRAZOLE SODIUM 40 MG: 40 TABLET, DELAYED RELEASE ORAL at 20:49

## 2019-01-25 RX ADMIN — VERAPAMIL HYDROCHLORIDE 2.5 MG: 2.5 INJECTION INTRAVENOUS at 13:55

## 2019-01-25 RX ADMIN — IOPAMIDOL 20 ML: 755 INJECTION, SOLUTION INTRAVENOUS at 14:45

## 2019-01-25 RX ADMIN — LIDOCAINE HYDROCHLORIDE 1 ML: 10 INJECTION, SOLUTION EPIDURAL; INFILTRATION; INTRACAUDAL; PERINEURAL at 13:52

## 2019-01-25 RX ADMIN — OXYCODONE HYDROCHLORIDE 5 MG: 5 TABLET ORAL at 20:49

## 2019-01-25 RX ADMIN — ATORVASTATIN CALCIUM 80 MG: 40 TABLET, FILM COATED ORAL at 20:49

## 2019-01-25 RX ADMIN — ASPIRIN 81 MG: 81 TABLET, COATED ORAL at 08:25

## 2019-01-25 RX ADMIN — TICAGRELOR 90 MG: 90 TABLET ORAL at 03:34

## 2019-01-25 RX ADMIN — OXYCODONE HYDROCHLORIDE 5 MG: 5 TABLET ORAL at 15:59

## 2019-01-25 RX ADMIN — MIDAZOLAM HYDROCHLORIDE 2 MG: 1 INJECTION, SOLUTION INTRAMUSCULAR; INTRAVENOUS at 13:47

## 2019-01-25 RX ADMIN — FENTANYL CITRATE 50 MCG: 50 INJECTION, SOLUTION INTRAMUSCULAR; INTRAVENOUS at 13:47

## 2019-01-25 RX ADMIN — IOPAMIDOL 120 ML: 755 INJECTION, SOLUTION INTRAVENOUS at 14:37

## 2019-01-25 RX ADMIN — HEPARIN SODIUM 2500 UNITS: 1000 INJECTION INTRAVENOUS; SUBCUTANEOUS at 13:53

## 2019-01-25 RX ADMIN — NITROGLYCERIN 200 MCG: 5 INJECTION, SOLUTION INTRAVENOUS at 13:53

## 2019-01-25 RX ADMIN — ISOSORBIDE MONONITRATE 30 MG: 30 TABLET, EXTENDED RELEASE ORAL at 08:25

## 2019-01-25 RX ADMIN — DOPAMINE HYDROCHLORIDE IN DEXTROSE 2.5 MCG/KG/MIN: 3.2 INJECTION, SOLUTION INTRAVENOUS at 14:12

## 2019-01-25 RX ADMIN — HEPARIN SODIUM 6000 UNITS: 1000 INJECTION INTRAVENOUS; SUBCUTANEOUS at 14:01

## 2019-01-25 RX ADMIN — SODIUM CHLORIDE 500 ML: 900 INJECTION, SOLUTION INTRAVENOUS at 14:29

## 2019-01-25 RX ADMIN — TICAGRELOR 90 MG: 90 TABLET ORAL at 15:59

## 2019-01-25 RX ADMIN — DOPAMINE HYDROCHLORIDE 2.5 MCG/KG/MIN: 320 INJECTION, SOLUTION INTRAVENOUS at 14:12

## 2019-01-25 RX ADMIN — HEPARIN SODIUM 2500 UNITS: 1000 INJECTION, SOLUTION INTRAVENOUS; SUBCUTANEOUS at 13:53

## 2019-01-25 RX ADMIN — METOPROLOL SUCCINATE 12.5 MG: 25 TABLET, EXTENDED RELEASE ORAL at 22:46

## 2019-01-25 NOTE — PROGRESS NOTES
Problem: Pressure Injury - Risk of 
Goal: *Prevention of pressure injury Document Quique Scale and appropriate interventions in the flowsheet. Outcome: Progressing Towards Goal 
Pressure Injury Interventions:

## 2019-01-25 NOTE — PROGRESS NOTES
Problem: Falls - Risk of 
Goal: *Absence of Falls Document Luis Felipe Dust Fall Risk and appropriate interventions in the flowsheet. Outcome: Progressing Towards Goal 
Fall Risk Interventions: 
  
 
  
 
Medication Interventions: Assess postural VS orthostatic hypotension, Evaluate medications/consider consulting pharmacy, Patient to call before getting OOB, Teach patient to arise slowly History of Falls Interventions: Door open when patient unattended

## 2019-01-25 NOTE — ROUTINE PROCESS
1559: Pt c/o generalized pain, PRN Oxycodone given. 1623: Pt c/o diarrhea. He states that this happens a lot at home, only has been happening when he eats (did not occur at all during NPO status), PRN Pepto given. 1740: Pt has ambulated with RN, tolerated well, site CDI, no complaints, tele unremarkable.

## 2019-01-25 NOTE — PROGRESS NOTES
1/25/2019 8:21 AM 
 
Admit Date: 1/23/2019 Admit Diagnosis: cath;CAD (coronary artery disease) Subjective: Amy Glover   denies chest pain, chest pressure/discomfort, dyspnea, palpitations, irregular heart beats, near-syncope, syncope, fatigue, orthopnea, paroxysmal nocturnal dyspnea, exertional chest pressure/discomfort. Visit Vitals /64 Pulse 60 Temp 98 °F (36.7 °C) Resp 16 Ht 5' 10\" (1.778 m) Wt 185 lb (83.9 kg) SpO2 96% BMI 26.54 kg/m² Current Facility-Administered Medications Medication Dose Route Frequency  bismuth subsalicylate (PEPTO-BISMOL) oral suspension 30 mL  30 mL Oral Q6H PRN  
 aspirin delayed-release tablet 81 mg  81 mg Oral DAILY  ticagrelor (BRILINTA) tablet 90 mg  90 mg Oral Q12H  
 albuterol (PROVENTIL HFA, VENTOLIN HFA, PROAIR HFA) inhaler 1 Puff  1 Puff Inhalation Q4H PRN  
 DULoxetine (CYMBALTA) capsule 60 mg  60 mg Oral DAILY  isosorbide mononitrate ER (IMDUR) tablet 30 mg  30 mg Oral DAILY  lisinopril (PRINIVIL, ZESTRIL) 20 mg, hydroCHLOROthiazide (MICROZIDE) 12.5 mg   Oral DAILY  metoprolol succinate (TOPROL-XL) XL tablet 25 mg  25 mg Oral QHS  pantoprazole (PROTONIX) tablet 40 mg  40 mg Oral BID  atorvastatin (LIPITOR) tablet 80 mg  80 mg Oral QHS  oxyCODONE IR (ROXICODONE) tablet 5 mg  5 mg Oral Q4H PRN Objective:  
  
Visit Vitals /64 Pulse 60 Temp 98 °F (36.7 °C) Resp 16 Ht 5' 10\" (1.778 m) Wt 185 lb (83.9 kg) SpO2 96% BMI 26.54 kg/m² Physical Exam: Abdomen: soft, non-tender. Bowel sounds normal.  
Extremities: no cyanosis or edema Heart: regular rate and rhythm, S1, S2 normal, no murmur, click, rub or gallop Lungs: clear to auscultation bilaterally Neurologic: Grossly normal 
 
Data Review:  
 
Telemetry: normal sinus rhythm Assessment:  
 
Active Problems: Hypertension, essential (8/8/2016) Tobacco use (11/15/2018) Cardiomyopathy ([facility] [address] (1/22/2019) Claudication of both lower extremities ([facility] [address] (1/22/2019) CAD (coronary artery disease) (1/23/2019) Plan: 1. Multivessel CAD: s/p LAD and Cx stent. For RCA PCI today. Continue current meds. 2. PVD: will address in few weeks after recovering from above. 3. HTN: BP controlled. 4. Smoking cessation.

## 2019-01-25 NOTE — PROGRESS NOTES
Bedside and Verbal shift change report given to Jen Wheatley RN (oncoming nurse) by Tawanna Carlisle RN (offgoing nurse). Report included the following information SBAR, Kardex, Procedure Summary, Intake/Output, MAR, Accordion, Recent Results and Cardiac Rhythm NSR/SB.

## 2019-01-25 NOTE — PROGRESS NOTES
Homer Rodríguez is recovering post-procedure. L radial site dressing is CDI without swelling or bleeding. VSS. Rhythm sinus. Homer Rodríguez denies complaints at this time. If recovery continues to progress without complication, discharge is planned for tomorrow.   
 
 
 
 
Roscoe Mccoy NP 
DNP, RN, AGACNP-BC

## 2019-01-25 NOTE — PROGRESS NOTES
Bedside shift change report given to FREDI Junior (oncoming nurse) by Koffi Kelly RN (offgoing nurse). Report included the following information SBAR, Kardex, Procedure Summary, Intake/Output, MAR, Accordion, Recent Results, Med Rec Status, Cardiac Rhythm NSR, Sinus Sebastian Deal, Alarm Parameters , Pre Procedure Checklist, Procedure Verification and Quality Measures.

## 2019-01-25 NOTE — DISCHARGE INSTRUCTIONS
[address]  [phone]        Patient ID:  Tyler Krishnamurthy  [id number]  12 y.o.  1960    Admit Date: 1/23/2019    Discharge Date: 1/26/2019     Admitting Physician: Krysta Scott MD     Discharge Physician: Dr. Manuelito Pablo     Admission Diagnoses:   cath  CAD (coronary artery disease)    Discharge Diagnoses: Active Problems:    Hypertension, essential (8/8/2016)      Tobacco use (11/15/2018)      Cardiomyopathy ([facility] [address] (1/22/2019)      Claudication of both lower extremities ([facility] [address] (1/22/2019)      CAD (coronary artery disease) (1/23/2019)      S/P cardiac cath (1/25/2019)      Overview: 1/22/19:  LAD and Cx stent      1/25/19:  RCA PCI                                      Discharge Condition: Good    Cardiology Procedures this Admission:  Left heart catheterization with PCI  EchoCardiogram    Disposition: home    Reference discharge instructions provided by nursing for diet and activity. Signed:  Little Spangler NP  1/25/2019  5:23 PM      Radial Cardiac Catheterization/Angiography Discharge Instructions    It is normal to feel tired the first couple days. Take it easy and follow the physicians instructions. CHECK THE CATHETER INSERTION SITE DAILY:    Remove the wrist dressing 24 hours after the procedure. You may shower 24 hours after the procedure. Wash with soap and water and pat dry. Gentle cleaning of the site with soap and water is sufficient, cover with a dry clean dressing or bandage. Do not apply creams or powders to the area. No soaking the wrist for 3 days. Leave the puncture site open to air after 24 hours post-procedure. CALL THE PHYSICIANS:     If the site becomes red, swollen or feels warm to the touch  If there is bleeding or drainage or if there is unusual pain at the radial site. If there is any minor oozing, you may apply a band-aid and remove after 12 hours.    If the bleeding continues, hold pressure with the middle finger against the puncture site and the thumb against the back of the wrist,call 911 to be transported to the hospital.  DO NOT DRIVE YOURSELF, [place] [address]. ACTIVITY:   For the first 24 hours do not manipulate the wrist.  No lifting, pushing or pulling over 3-5 pounds with the affected wrist for 7 daysand no straining the insertion site. Do not life grocery bags or the garbage can, do not run the vacuum  or  for 7 days. Start with short walks as in the hospital and gradually increase as tolerated each day. It is recommended to walk 30 minutes 5-7 days per week. Follow your physicians instructions on activity. Avoid walking outside in extremes of heat or cold. Walk inside when it is cold and windy or hot and humid. Things to keep in mind:  No driving for at least 24 hours, or as designated by your physician. Limit the number of times you go up and down the stairs  Take rests and pace yourself with activity. Be careful and do not strain with bowel movements. MEDICATIONS:    Take all medications as prescribed  Call your physician if you have any questions  Keep an updated list of your medications with you at all times and give a list to your physician and pharmacist    SIGNS AND SYMPTOMS:   Be cautious of symptoms of angina or recurrent symptoms such as chest discomfort, unusual shortness of breath or fatigue. These could be symptoms of restenosis, a new blockage or a heart attack. If your symptoms are relieved with rest it is still recommended that you notify your physician of recurrent chest pain or discomfort. For CHEST PAIN or symptoms of angina not relieved with rest:  If the discomfort is not relieved with rest, and you have been prescribed Nitroglycerin, take as directed (taken under the tongue, one at a time 5 minutes apart for a total of 3 doses). If the discomfort is not relieved after the 3rd nitroglycerin, call 911.   If you have not been prescribed Nitroglycerin  and your chest discomfort is not relieved with rest, call 911. AFTER CARE:   Follow up with your physician as instructed. Follow a heart healthy diet with proper portion control, daily stress management, daily exercise, blood pressure and cholesterol control , and smoking cessation. Cardiology Discharge Instructions       CARDIAC CATHETERIZATION/PCI DISCHARGE INSTRUCTIONS    It is normal to feel tired the first couple days. Take it easy and follow the physicians instructions. CHECK THE CATHETER INSERTION SITE DAILY:    You may shower 24 hours after the procedure, remove the bandage during showering. Wash with soap and water and pat dry. Gentle cleaning of the site with soap and water is sufficient, cover with a dry clean dressing or bandage. Do not apply creams or powders to the area. Do not sit in a bathtub or pool of water for 7 days or until wound has completely healed. Temporary bruising and discomfort is normal and may last a few weeks. You may have a  formation of a small lump at the site which may last up to 6 weeks. CALL THE PHYSICIANS:    If the site becomes red, swollen or feels warm to the touch  If there is bleeding or drainage or if there is unusual pain at the groin or down the leg. If there is any bleeding, lie down, apply pressure or have someone apply pressure with a clean cloth until the bleeding stops. If the bleeding continues, call 911 to be transported to the hospital.  DO NOT DRIVE YOURSELF, [place] [address]. ACTIVITY:    For the first 24-48 hours or as instructed by the physician:  No lifting, pushing or pulling over 10 pounds and no straining the insertion site. Do not life grocery bags or the garbage can, do not run the vacuum  or  for 7 days. Start with short walks as in the hospital and gradually increase as tolerated each day.   It is recommended to walk 30 minutes 5-7 days per week. Follow your physicians instructions on activity. Avoid walking outside in extremes of heat or cold. Walk inside when it is cold and windy or hot and humid. Things to keep in mind:  No driving for at least 24 hours, or as designated by your physician. Limit the number of times you go up and down the stairs  Take rests and pace yourself with activity. Be careful and do not strain with bowel movements. MEDICATIONS:    Take all medications as prescribed  Call your physician if you have any questions  Keep an updated list of your medications with you at all times and give a list to your physician and pharmacist        SIGNS AND SYMPTOMS:    Be cautious of symptoms of angina or recurrent symptoms such as chest discomfort, unusual shortness of breath or fatigue. These could be symptoms of restenosis, a new blockage or a heart attack. If your symptoms are relieved with rest it is still recommended that you notify your physician of recurrent chest pain or discomfort. FOR CHEST PAIN or symptoms of angina not relieved with rest:  If the discomfort is not relieved with rest, and you have been prescribed Nitroglycerin, take as directed (taken under the tongue, one at a time 5 minutes apart for a total of 3 doses). If the discomfort is not relieved after the 3rd nitroglycerin, call 911. If you have not been prescribed Nitroglycerin  and your chest discomfort is not relieved with rest, call 911. AFTER CARE:    Follow up with your physician as instructed. Follow a heart healthy diet with proper portion control, daily stress management, daily exercise, blood pressure and cholesterol control , and smoking cessation. Smoking Cessation Program:   This is a free,  phone/text/email based, smoking cessation program. The program is individualized to meet each patient's needs. To enroll use this link [email or web address]/ra/survey/5390

## 2019-01-26 VITALS
BODY MASS INDEX: 26.48 KG/M2 | OXYGEN SATURATION: 97 % | HEART RATE: 61 BPM | HEIGHT: 70 IN | SYSTOLIC BLOOD PRESSURE: 113 MMHG | WEIGHT: 185 LBS | TEMPERATURE: 97.7 F | RESPIRATION RATE: 18 BRPM | DIASTOLIC BLOOD PRESSURE: 62 MMHG

## 2019-01-26 PROCEDURE — 74011250637 HC RX REV CODE- 250/637: Performed by: INTERNAL MEDICINE

## 2019-01-26 RX ADMIN — OXYCODONE HYDROCHLORIDE 5 MG: 5 TABLET ORAL at 06:03

## 2019-01-26 RX ADMIN — ISOSORBIDE MONONITRATE 30 MG: 30 TABLET, EXTENDED RELEASE ORAL at 07:51

## 2019-01-26 RX ADMIN — PANTOPRAZOLE SODIUM 40 MG: 40 TABLET, DELAYED RELEASE ORAL at 07:55

## 2019-01-26 RX ADMIN — LISINOPRIL: 20 TABLET ORAL at 07:52

## 2019-01-26 RX ADMIN — TICAGRELOR 90 MG: 90 TABLET ORAL at 03:28

## 2019-01-26 RX ADMIN — DULOXETINE 60 MG: 30 CAPSULE, DELAYED RELEASE ORAL at 07:51

## 2019-01-26 RX ADMIN — ASPIRIN 81 MG: 81 TABLET, COATED ORAL at 07:51

## 2019-01-26 NOTE — PROGRESS NOTES
PCP YAMILET appt scheduled with Dr. Alexander Braun on 2/4/2019 at 1:45pm. Appt added to AVS. Eddye Sandhoff, CM Specialist

## 2019-01-26 NOTE — DISCHARGE SUMMARY
1/26/2019 3:48 PM    Admit Date: 1/23/2019    Admit Diagnosis:   cath;CAD (coronary artery disease)    Subjective: Julio Heaton denies chest pain or shortness of breath. No current facility-administered medications for this encounter. Current Outpatient Medications   Medication Sig    ticagrelor (BRILINTA) 90 mg tablet Take 1 Tab by mouth every twelve (12) hours every twelve (12) hours.  metoprolol succinate (TOPROL-XL) 25 mg XL tablet Take 0.5 Tabs by mouth nightly.  nitroglycerin (NITROSTAT) 0.4 mg SL tablet 1 Tab by SubLINGual route every five (5) minutes as needed for Chest Pain. Up to 3 doses.  albuterol (PROVENTIL HFA, VENTOLIN HFA, PROAIR HFA) 90 mcg/actuation inhaler Take 1 Puff by inhalation every four (4) hours as needed for Wheezing.  isosorbide mononitrate ER (IMDUR) 30 mg tablet Take 1 Tab by mouth daily.  oxyCODONE IR (ROXICODONE) 5 mg immediate release tablet Take 1 Tab by mouth every four (4) hours as needed for Pain. Max Daily Amount: 30 mg. (Patient not taking: Reported on 1/22/2019)    DULoxetine (CYMBALTA) 60 mg capsule Take 60 mg by mouth daily.  metoprolol succinate (TOPROL-XL) 25 mg XL tablet Take 1 Tab by mouth nightly.  rosuvastatin (CRESTOR) 40 mg tablet Take 1 Tab by mouth nightly.  aspirin delayed-release 81 mg tablet Take 1 Tab by mouth daily.  omeprazole (PRILOSEC) 40 mg capsule Take 1 Cap by mouth two (2) times a day.  busPIRone (BUSPAR) 5 mg tablet Take 1 Tab by mouth three (3) times daily (with meals).  acetaminophen (TYLENOL EXTRA STRENGTH) 500 mg tablet Take  by mouth every six (6) hours as needed for Pain.  lisinopril-hydroCHLOROthiazide (PRINZIDE, ZESTORETIC) 20-12.5 mg per tablet Take 2 Tabs by mouth daily.  calcium carbonate (TUMS) 200 mg calcium (500 mg) chew Take 1 Tab by mouth daily.          Objective:      Physical Exam:    Visit Vitals  /62   Pulse 61   Temp 97.7 °F (36.5 °C)   Resp 18   Ht 5' 10\" (1.778 m) Wt 185 lb (83.9 kg)   SpO2 97%   BMI 26.54 kg/m²     Gen:  NAD  Mental Status - Alert. General Appearance - Not in acute distress. Chest and Lung Exam   Inspection: Accessory muscles - No use of accessory muscles in breathing. Auscultation:   Breath sounds: - Normal.   Cardiovascular   Inspection: Jugular vein - Bilateral - Inspection Normal.   Palpation/Percussion:   Apical Impulse: - Normal.   Auscultation: Rhythm - Regular. Heart Sounds - S1 WNL and S2 WNL. No S3 or S4. Murmurs & Other Heart Sounds: Auscultation of the heart reveals - No Murmurs. Peripheral Vascular   Upper Extremity: Inspection - Bilateral - No Cyanotic nailbeds or Digital clubbing. right femoral and left radial intact, uncomplicated   Lower Extremity:   Palpation: Edema - Bilateral - No edema. Abdomen:   Soft, non-tender, bowel sounds are active. Neuro: A&O times 3, CN and motor grossly WNL    Data Review:   No results for input(s): WBC, HGB, HCT, PLT, HGBEXT, HCTEXT, PLTEXT in the last 72 hours. No results for input(s): NA, K, CL, CO2, GLU, BUN, CREA, CA, MG, PHOS, ALB, TBIL, TBILI, SGOT, ALT, INR in the last 72 hours. No lab exists for component: INREXT    No results for input(s): TROIQ, CPK, CKMB in the last 72 hours. Intake/Output Summary (Last 24 hours) at 1/26/2019 1548  Last data filed at 1/26/2019 0703  Gross per 24 hour   Intake 1250 ml   Output 950 ml   Net 300 ml        Telemetry: normal sinus rhythm    Assessment:     Active Problems:    Hypertension, essential (8/8/2016)      Tobacco use (11/15/2018)      Cardiomyopathy ([address] (1/22/2019)      Claudication of both lower extremities ([address] (1/22/2019)      CAD (coronary artery disease) (1/23/2019)      S/P cardiac cath (1/25/2019)      Overview: 1/22/19:  LAD and Cx stent      1/25/19:  RCA PCI                                      Plan:     1. Multivessel CAD: s/p multivessel PCI. Continue current meds. Doing well s/p TIFFANIE's.   No CP/ SOB, access site or neuro complaints. Ambulating. D/C today- see discharge instructions. Encouraged to participate in cardiac rehab. 2. PVD: will address in few weeks after recovering from above. 3. HTN: BP controlled.    4. Smoking cessation.

## 2019-01-26 NOTE — PROGRESS NOTES
Discharge instructions reviewed with patient. Allowed adequate time to ask questions, all questions answered. Printed copy of AVS given to patient. All belongings gathered, IV and tele discontinued.

## 2019-01-27 PROBLEM — I25.118 CORONARY ARTERY DISEASE OF NATIVE ARTERY WITH STABLE ANGINA PECTORIS (HCC): Status: ACTIVE | Noted: 2019-01-23

## 2019-02-01 ENCOUNTER — DOCUMENTATION ONLY (OUTPATIENT)
Dept: CARDIOLOGY CLINIC | Age: 59
End: 2019-02-01

## 2019-02-06 PROBLEM — I73.9 PERIPHERAL VASCULAR DISEASE WITH CLAUDICATION (HCC): Status: ACTIVE | Noted: 2019-02-06

## 2019-02-07 ENCOUNTER — OFFICE VISIT (OUTPATIENT)
Dept: INTERNAL MEDICINE CLINIC | Facility: CLINIC | Age: 59
End: 2019-02-07

## 2019-02-07 VITALS
SYSTOLIC BLOOD PRESSURE: 168 MMHG | BODY MASS INDEX: 26.48 KG/M2 | DIASTOLIC BLOOD PRESSURE: 88 MMHG | OXYGEN SATURATION: 98 % | HEIGHT: 70 IN | TEMPERATURE: 98.2 F | WEIGHT: 185 LBS | RESPIRATION RATE: 14 BRPM | HEART RATE: 89 BPM

## 2019-02-07 DIAGNOSIS — I73.9 PERIPHERAL VASCULAR DISEASE WITH CLAUDICATION (HCC): ICD-10-CM

## 2019-02-07 DIAGNOSIS — Z13.31 SCREENING FOR DEPRESSION: ICD-10-CM

## 2019-02-07 DIAGNOSIS — I10 HYPERTENSION, ESSENTIAL: ICD-10-CM

## 2019-02-07 DIAGNOSIS — K21.9 GASTROESOPHAGEAL REFLUX DISEASE WITHOUT ESOPHAGITIS: ICD-10-CM

## 2019-02-07 DIAGNOSIS — I25.5 ISCHEMIC CARDIOMYOPATHY: ICD-10-CM

## 2019-02-07 DIAGNOSIS — I25.118 CORONARY ARTERY DISEASE OF NATIVE ARTERY OF NATIVE HEART WITH STABLE ANGINA PECTORIS (HCC): Primary | ICD-10-CM

## 2019-02-07 DIAGNOSIS — E78.2 HYPERLIPEMIA, MIXED: ICD-10-CM

## 2019-02-07 DIAGNOSIS — E66.9 OBESITY (BMI 30.0-34.9): ICD-10-CM

## 2019-02-07 DIAGNOSIS — F41.8 DEPRESSION WITH ANXIETY: ICD-10-CM

## 2019-02-07 DIAGNOSIS — F17.210 MODERATE CIGARETTE SMOKER (10-19 PER DAY): ICD-10-CM

## 2019-02-07 RX ORDER — CHOLECALCIFEROL (VITAMIN D3) 125 MCG
CAPSULE ORAL
COMMUNITY
End: 2019-05-24

## 2019-02-07 RX ORDER — METOPROLOL SUCCINATE 25 MG/1
25 TABLET, EXTENDED RELEASE ORAL
Qty: 90 TAB | Refills: 3 | Status: SHIPPED | OUTPATIENT
Start: 2019-02-07 | End: 2021-08-16 | Stop reason: SDUPTHER

## 2019-02-07 RX ORDER — OMEPRAZOLE 40 MG/1
40 CAPSULE, DELAYED RELEASE ORAL
Qty: 90 CAP | Refills: 3 | Status: SHIPPED | OUTPATIENT
Start: 2019-02-07 | End: 2021-01-25 | Stop reason: SDUPTHER

## 2019-02-07 RX ORDER — LISINOPRIL AND HYDROCHLOROTHIAZIDE 12.5; 2 MG/1; MG/1
2 TABLET ORAL DAILY
Qty: 180 TAB | Refills: 1 | Status: SHIPPED | OUTPATIENT
Start: 2019-02-07 | End: 2019-10-09 | Stop reason: SDUPTHER

## 2019-02-07 RX ORDER — ISOSORBIDE MONONITRATE 30 MG/1
30 TABLET, EXTENDED RELEASE ORAL DAILY
Qty: 90 TAB | Refills: 3 | Status: SHIPPED | OUTPATIENT
Start: 2019-02-07 | End: 2020-02-13

## 2019-02-07 RX ORDER — ROSUVASTATIN CALCIUM 40 MG/1
40 TABLET, COATED ORAL
Qty: 90 TAB | Refills: 3 | Status: SHIPPED | OUTPATIENT
Start: 2019-02-07 | End: 2020-01-13

## 2019-02-07 RX ORDER — DULOXETIN HYDROCHLORIDE 60 MG/1
60 CAPSULE, DELAYED RELEASE ORAL DAILY
Qty: 90 CAP | Refills: 3 | Status: SHIPPED | OUTPATIENT
Start: 2019-02-07 | End: 2020-01-13

## 2019-02-07 NOTE — PROGRESS NOTES
HISTORY OF PRESENT ILLNESS  Tati Segura is a 60 y.o. male. HPIj  Mr. Reed Suazo is a 60y.o. year old male, he is seen today for Transition of Care services following a hospital discharge for CAD with stent placement on Jan 26. Ramon Trevizo He wad admitted for cardiac cath after an abnormal nuclear stress test.performed for GARCIA and chest pressure. He had a 99% oculusion in the porx RCA, 80% lesion in the right posterior descending, 90% lesion in the CX and a 95% lesion in the mid LAD. All were treated with TIFFANIE's ,but at 2 settings. The first on 1/23 and the second on 1/25. He was noted to have atherosclersis of the lower extremity vessels: left internal iliac moderate, left common femoral minor irregularities, left superficial femoral 80% stenosis, left anterior tibial 100% stenosis, right common iliac mild, right internal iliac moderate, right external iliac mild, right common femoral minor, right superficial femoral 90% stenosis. The lower extremity will addressed soon. Previous echo showed LVEF 25 %    He was give metoprolol and isosorbide at an office visit. Added medication after that includes; rosuvastatin, aspirin, Ticagelor. Cardiac rehab has been ordered. He has a set of papers where rosuvastatin was not marked as taking by the hospital.      He also has hypertension, hyperlipidemia, peripheral vascular disease and glucose intolerance. Diet and Lifestyle: generally follows a low fat low cholesterol diet, generally follows a low sodium diet, sedentary, smoker 0.5 ppd  Medication compliance: compliant all of the time  Medication side effects: none  Home BP Monitoring: not done  Cardiovascular ROS:  He complains of lower extremity edema.     He denies palpitations, orthopnea, exertional chest pressure/discomfort, claudication, dyspnea on exertion, dizziness     Patient Active Problem List   Diagnosis Code    Adenoma of right adrenal gland D35.01    Pulmonary nodules/lesions, multiple R91.8    GERD (gastroesophageal reflux disease) K21.9    Post-traumatic osteoarthritis of left knee M17.32    COPD (chronic obstructive pulmonary disease) ([facility]) J44.9    Depression with anxiety F41.8    Hyperlipemia, mixed E78.2    Hypertension, essential I10    Noncompliance with medication regimen Z91.14    Glucose intolerance (impaired glucose tolerance) R73.02    Moderate cigarette smoker (10-19 per day) F17.210    Cardiomyopathy ([facility] [address] I42.9    Coronary artery disease of native artery with stable angina pectoris ([facility]) I25.118    Peripheral vascular disease with claudication ([facility]) I73.9     Past Medical History:   Diagnosis Date    Adrenal mass, right ([address] 2014    Seen on abd CT    CAD (coronary artery disease)     calcific atherosclerosis on CT    Depression     Hypercholesterolemia     Hypertension     Multiple lung nodules      Past Surgical History:   Procedure Laterality Date    HX ORTHOPAEDIC      hand    HX ORTHOPAEDIC      foot     Social History     Socioeconomic History    Marital status: SINGLE     Spouse name: Not on file    Number of children: Not on file    Years of education: Not on file    Highest education level: Not on file   Tobacco Use    Smoking status: Current Every Day Smoker     Packs/day: 0.50     Types: Cigarettes    Smokeless tobacco: Never Used   Substance and Sexual Activity    Alcohol use: No     Alcohol/week: 0.0 oz     Comment: stopped jan 2014    Drug use: No     Family History   Problem Relation Age of Onset    Heart Disease Mother     Asthma Mother     Elevated Lipids Mother     Hypertension Mother      Allergies   Allergen Reactions    Advil [Ibuprofen] Hives    Duloxetine Diarrhea    Fluoxetine Diarrhea    Lovastatin Diarrhea    Tylenol [Acetaminophen] Hives     Current Outpatient Medications   Medication Sig Dispense Refill    naproxen sodium (ALEVE) 220 mg cap Take  by mouth.  rosuvastatin (CRESTOR) 40 mg tablet Take 1 Tab by mouth nightly.  [address] Tab 3    lisinopril-hydroCHLOROthiazide (PRINZIDE, ZESTORETIC) 20-12.5 mg per tablet Take 2 Tabs by mouth daily. 180 Tab 1    DULoxetine (CYMBALTA) 60 mg capsule Take 1 Cap by mouth daily. 90 Cap 3    isosorbide mononitrate ER (IMDUR) 30 mg tablet Take 1 Tab by mouth daily. 90 Tab 3    metoprolol succinate (TOPROL-XL) 25 mg XL tablet Take 1 Tab by mouth nightly. 90 Tab 3    omeprazole (PRILOSEC) 40 mg capsule Take 1 Cap by mouth daily as needed. 90 Cap 3    ticagrelor (BRILINTA) 90 mg tablet Take 1 Tab by mouth every twelve (12) hours every twelve (12) hours. 60 Tab 11    aspirin delayed-release 81 mg tablet Take 1 Tab by mouth daily. 100 Tab 1    nitroglycerin (NITROSTAT) 0.4 mg SL tablet 1 Tab by SubLINGual route every five (5) minutes as needed for Chest Pain. Up to 3 doses. 1 Bottle 1    albuterol (PROVENTIL HFA, VENTOLIN HFA, PROAIR HFA) 90 mcg/actuation inhaler Take 1 Puff by inhalation every four (4) hours as needed for Wheezing. 1 Inhaler 2    acetaminophen (TYLENOL EXTRA STRENGTH) 500 mg tablet Take  by mouth every six (6) hours as needed for Pain.  calcium carbonate (TUMS) 200 mg calcium (500 mg) chew Take 1 Tab by mouth daily. Review of Systems   Constitutional: Negative for malaise/fatigue. Gastrointestinal: Positive for diarrhea (once a day off and on). Musculoskeletal: Positive for joint pain. Visit Vitals  /88 (BP 1 Location: Left arm, BP Patient Position: Sitting)   Pulse 89   Temp 98.2 °F (36.8 °C) (Oral)   Resp 14   Ht 5' 10\" (1.778 m)   Wt 185 lb (83.9 kg)   SpO2 98%   BMI 26.54 kg/m²     Physical Exam   Constitutional: He is oriented to person, place, and time. He appears well-developed and well-nourished. HENT:   Head: Normocephalic and atraumatic. Eyes: Conjunctivae are normal. Pupils are equal, round, and reactive to light. Neck: Neck supple. Carotid bruit is not present. No thyromegaly present.    Cardiovascular: Normal rate, regular rhythm and normal heart sounds. PMI is not displaced. Exam reveals no gallop. No murmur heard. Pulses:       Dorsalis pedis pulses are 0 on the right side, and 0 on the left side. Posterior tibial pulses are 0 on the right side, and 0 on the left side. Pulmonary/Chest: Effort normal. He has no wheezes. He has no rhonchi. He has no rales. Abdominal: Soft. Normal appearance. He exhibits no abdominal bruit and no mass. There is no hepatosplenomegaly. There is no tenderness. Musculoskeletal: He exhibits no edema. Lymphadenopathy:     He has no cervical adenopathy. Right: No supraclavicular adenopathy present. Left: No supraclavicular adenopathy present. Neurological: He is alert and oriented to person, place, and time. No sensory deficit. Skin: Skin is warm, dry and intact. No rash noted. Psychiatric: He has a normal mood and affect. His behavior is normal.   Nursing note and vitals reviewed. Lab Results   Component Value Date/Time    Cholesterol, total 302 (H) 04/18/2017 10:04 AM    HDL Cholesterol 46 04/18/2017 10:04 AM    LDL,Direct 181 (H) 10/25/2018 01:40 PM    LDL, calculated 227 (H) 04/18/2017 10:04 AM    VLDL, calculated 29 04/18/2017 10:04 AM    Triglyceride 144 04/18/2017 10:04 AM     Lab Results   Component Value Date/Time    Sodium 141 01/22/2019 12:56 PM    Potassium 4.4 01/22/2019 12:56 PM    Chloride 102 01/22/2019 12:56 PM    CO2 24 01/22/2019 12:56 PM    Anion gap 8 12/01/2018 01:16 PM    Glucose 114 (H) 01/22/2019 12:56 PM    BUN 13 01/22/2019 12:56 PM    Creatinine 0.75 (L) 01/22/2019 12:56 PM    BUN/Creatinine ratio 17 01/22/2019 12:56 PM    GFR est  01/22/2019 12:56 PM    GFR est non- 01/22/2019 12:56 PM    Calcium 9.8 01/22/2019 12:56 PM    Bilirubin, total 0.2 01/22/2019 12:56 PM    AST (SGOT) 17 01/22/2019 12:56 PM    Alk.  phosphatase 103 01/22/2019 12:56 PM    Protein, total 7.1 01/22/2019 12:56 PM    Albumin 4.7 01/22/2019 12:56 PM    Globulin 3.3 12/01/2018 01:16 PM    A-G Ratio 2.0 01/22/2019 12:56 PM    ALT (SGPT) 19 01/22/2019 12:56 PM     Lab Results   Component Value Date/Time    Hemoglobin A1c 5.7 (H) 04/18/2017 10:04 AM    Hemoglobin A1c (POC) 5.6 11/01/2018 08:28 AM       ASSESSMENT and PLAN    ICD-10-CM ICD-9-CM    1. Coronary artery disease of native artery of native heart with stable angina pectoris (HCC) I25.118 414.01 isosorbide mononitrate ER (IMDUR) 30 mg tablet     413.9 metoprolol succinate (TOPROL-XL) 25 mg XL tablet   2. Peripheral vascular disease with claudication (HCC) I73.9 443.9    3. Ischemic cardiomyopathy I25.5 414.8    4. Hyperlipemia, mixed E78.2 272.2 TSH 3RD GENERATION      LIPID PANEL      rosuvastatin (CRESTOR) 40 mg tablet   5. Moderate cigarette smoker (10-19 per day) F17.210 305.1    6. Obesity (BMI 30.0-34. 9) E66.9 278.00    7. Screening for depression Z13.31 V79.0 BEHAV ASSMT W/SCORE & DOCD/STAND INSTRUMENT   8. Hypertension, essential I10 401.9 lisinopril-hydroCHLOROthiazide (PRINZIDE, ZESTORETIC) 20-12.5 mg per tablet   9. Gastroesophageal reflux disease without esophagitis K21.9 530.81 omeprazole (PRILOSEC) 40 mg capsule   10. Depression with anxiety F41.8 300.4 DULoxetine (CYMBALTA) 60 mg capsule     Diagnoses and all orders for this visit:    1. Coronary artery disease of native artery of native heart with stable angina pectoris (HCC)  No chest pain or dyspnea since stents. -     isosorbide mononitrate ER (IMDUR) 30 mg tablet; Take 1 Tab by mouth daily. -     metoprolol succinate (TOPROL-XL) 25 mg XL tablet; Take 1 Tab by mouth nightly. 2. Peripheral vascular disease with claudication ([facility] [address]  Will be addressed once recovered from cardiac procedures. 3. Ischemic cardiomyopathy  No S/S of HF    4. Hyperlipemia, mixed  Control uncertain as rosuvastatin was started recently. Needs follow up lipid panel   -     TSH 3RD GENERATION; Future  -     LIPID PANEL; Future  -     rosuvastatin (CRESTOR) 40 mg tablet;  Take 1 Tab by mouth nightly. 5. Moderate cigarette smoker (10-19 per day)  Has cut down; encourage to quit. 6. Obesity (BMI 30.0-34. 9)  Discussed using smaller plate for portion control, keeping a food diary for awareness of food consumed, checking weight often, and increasing physical activity. Will re-evaluate next visit. 7. Screening for depression-negative  -     BEHAV ASSMT W/SCORE & DOCD/STAND INSTRUMENT    8. Hypertension, essential  Blood pressure high today, but stopped taking some medication as they were listed  not marked with administration time on discharge paperwork  -    Restart  lisinopril-hydroCHLOROthiazide (PRINZIDE, ZESTORETIC) 20-12.5 mg per tablet; Take 2 Tabs by mouth daily. 9. Gastroesophageal reflux disease without esophagitis  Symptoms controlled. -     omeprazole (PRILOSEC) 40 mg capsule; Take 1 Cap by mouth daily as needed. 10. Depression with anxiety  -     DULoxetine (CYMBALTA) 60 mg capsule; Take 1 Cap by mouth daily. Follow-up Disposition:  Return in about 3 months (around 5/7/2019) for HTN, CAD, chol, gluc   Fasting lab in ONE month .  lab results and schedule of future lab studies reviewed with patient  reviewed diet, exercise and weight control  I have discussed the diagnosis, evaluation and treatment options and the intended plan with the patient. Patient understands and is in agreement. The patient has received an after-visit summary and questions were answered concerning future plans. I have discussed side effects and warnings of any new medications with the patient as well.

## 2019-02-07 NOTE — PROGRESS NOTES
Preethi Marie  Identified pt with two pt identifiers(name and ). Chief Complaint   Patient presents with   [facility] Follow Up       Reviewed record In preparation for visit and have obtained necessary documentation. Has info on advanced directive but has not filled them out. 1. Have you been to the ER, urgent care clinic or hospitalized since your last visit? ED [facility] 19, ED [facility] ER 18, 18    2. Have you seen or consulted any other health care providers outside of the [address] [place] [address] since your last visit? Include any pap smears or colon screening. Dr Tyshawn Mishra reviewed with provider.     Health Maintenance reviewed:     Health Maintenance Due   Topic    Shingrix Vaccine Age 50> (1 of 2)          Wt Readings from Last 3 Encounters:   19 185 lb (83.9 kg)   19 185 lb (83.9 kg)   19 190 lb 4.8 oz (86.3 kg)        Temp Readings from Last 3 Encounters:   19 98.2 °F (36.8 °C) (Oral)   19 97.7 °F (36.5 °C)   18 97.8 °F (36.6 °C) (Oral)        BP Readings from Last 3 Encounters:   19 168/88   19 113/62   19 150/80        Pulse Readings from Last 3 Encounters:   19 89   19 61   19 76        Vitals:    19 1400 19 1403   BP: (!) 172/97 168/88   Pulse: 89    Resp: 14    Temp: 98.2 °F (36.8 °C)    TempSrc: Oral    SpO2: 98%    Weight: 185 lb (83.9 kg)    Height: 5' 10\" (1.778 m)    PainSc:   7    PainLoc: Generalized           Learning Assessment:   :       Learning Assessment 2014   PRIMARY LEARNER Patient   HIGHEST LEVEL OF EDUCATION - PRIMARY LEARNER  DID NOT GRADUATE HIGH SCHOOL   BARRIERS PRIMARY LEARNER READING   CO-LEARNER CAREGIVER No   PRIMARY LANGUAGE ENGLISH   LEARNER PREFERENCE PRIMARY DEMONSTRATION   ANSWERED BY patient   RELATIONSHIP SELF        Depression Screening:   :       PHQ over the last two weeks 2019   Little interest or pleasure in doing things Nearly every day   Feeling down, depressed, irritable, or hopeless Nearly every day   Total Score PHQ 2 6   Trouble falling or staying asleep, or sleeping too much -   Feeling tired or having little energy -   Poor appetite, weight loss, or overeating -   Feeling bad about yourself - or that you are a failure or have let yourself or your family down -   Trouble concentrating on things such as school, work, reading, or watching TV -   Moving or speaking so slowly that other people could have noticed; or the opposite being so fidgety that others notice -   Thoughts of being better off dead, or hurting yourself in some way -   PHQ 9 Score -   How difficult have these problems made it for you to do your work, take care of your home and get along with others -        Fall Risk Assessment:   :     No flowsheet data found. Abuse Screening:   :     No flowsheet data found.      ADL Screening:   :       ADL Assessment 10/31/2014   Feeding yourself No Help Needed   Getting from bed to chair No Help Needed   Getting dressed No Help Needed   Bathing or showering No Help Needed   Walk across the room (includes cane/walker) No Help Needed   Using the telphone No Help Needed   Taking your medications No Help Needed   Preparing meals No Help Needed   Managing money (expenses/bills) No Help Needed   Moderately strenuous housework (laundry) No Help Needed   Shopping for personal items (toiletries/medicines) No Help Needed   Shopping for groceries No Help Needed   Driving Help Needed   Climbing a flight of stairs No Help Needed   Getting to places beyond walking distances No Help Needed

## 2019-02-07 NOTE — PATIENT INSTRUCTIONS
Stop Aleve. You should not take it with Brillinta and aspirin. You may take Tylenol. Make appointment with Dr Ladona Holstein. You  Are due to see him the beginning of next week. He will check your blood pressure again and if still up, either he or I will adjust medication. I have sent 90 day refills on all of your prescription medications. Learning About Coronary Artery Disease (CAD)  What is coronary artery disease? Coronary artery disease (CAD) occurs when plaque builds up in the arteries that bring oxygen-rich blood to your heart. Plaque is a fatty substance made of cholesterol, calcium, and other substances in the blood. This process is called hardening of the arteries, or atherosclerosis. What happens when you have coronary artery disease? · Plaque may narrow the coronary arteries. Narrowed arteries cause poor blood flow. This can lead to angina symptoms such as chest pain or discomfort. If blood flow is completely blocked, you could have a heart attack. · You can slow CAD and reduce the risk of future problems by making changes in your lifestyle. These include quitting smoking and eating heart-healthy foods. · Treatments for CAD, along with changes in your lifestyle, can help you live a longer and healthier life. How can you prevent coronary artery disease? · Do not smoke. It may be the best thing you can do to prevent heart disease. If you need help quitting, talk to your doctor about stop-smoking programs and medicines. These can increase your chances of quitting for good. · Be active. Get at least 30 minutes of exercise on most days of the week. Walking is a good choice. You also may want to do other activities, such as running, swimming, cycling, or playing tennis or team sports. · Eat heart-healthy foods. Eat more fruits and vegetables and less foods that contain saturated and trans fats. Limit alcohol, sodium, and sweets. · Stay at a healthy weight.  Lose weight if you need to.  · Manage other health problems such as diabetes, high blood pressure, and high cholesterol. · Manage stress. Stress can hurt your heart. To keep stress low, talk about your problems and feelings. Don't keep your feelings hidden. · If you have talked about it with your doctor, take a low-dose aspirin every day. Aspirin can help certain people lower their risk of a heart attack or stroke. But taking aspirin isn't right for everyone, because it can cause serious bleeding. Do not start taking daily aspirin unless your doctor knows about it. How is coronary artery disease treated? · Your doctor will suggest that you make lifestyle changes. For example, your doctor may ask you to eat healthy foods, quit smoking, lose extra weight, and be more active. · You will have to take medicines. · Your doctor may suggest a procedure to open narrowed or blocked arteries. This is called angioplasty. Or your doctor may suggest using healthy blood vessels to create detours around narrowed or blocked arteries. This is called bypass surgery. Follow-up care is a key part of your treatment and safety. Be sure to make and go to all appointments, and call your doctor if you are having problems. It's also a good idea to know your test results and keep a list of the medicines you take. Where can you learn more? Go to http://nehemiah-nadia.info/. Enter ([phone] [phone] in the search box to learn more about \"Learning About Coronary Artery Disease (CAD). \"  Current as of: July 22, 2018  Content Version: 11.9  © 6351-8045 [employer], [employer]. Care instructions adapted under license by [employer] (which disclaims liability or warranty for this information). If you have questions about a medical condition or this instruction, always ask your healthcare professional. [address] any warranty or liability for your use of this information.

## 2019-02-08 ENCOUNTER — TELEPHONE (OUTPATIENT)
Dept: CARDIOLOGY CLINIC | Age: 59
End: 2019-02-08

## 2019-02-08 NOTE — TELEPHONE ENCOUNTER
Patient called said he was told to call Dr. Dora Queen , 2 weeks after his stents placed. Please call .  Thanks

## 2019-02-12 ENCOUNTER — TELEPHONE (OUTPATIENT)
Dept: CARDIOLOGY CLINIC | Age: 59
End: 2019-02-12

## 2019-02-12 NOTE — TELEPHONE ENCOUNTER
Returned pt's call,verified pt with two pt identifiers, pt advised he was told to call in after 2 weeks of his stent placement. I looked at his hospital f/u AVS and it stated for pt to f/u in office in 2 weeks. Advised pt of this. Advised him that I would send a note to  to call and schedule appt with him. Pt advised no cardiac symptoms at this time. Pt verbalized understanding. Message   Received: Today   Message Contents   Ami Mota, Tomasa Estrada, LPN   Caller: Unspecified (5 days ago,  1:17 PM)             Pt has apt adolfo ang on 2/20 @ 2:00pm      NOTED, THANKS.

## 2019-04-17 ENCOUNTER — HOSPITAL ENCOUNTER (OUTPATIENT)
Dept: CARDIAC REHAB | Age: 59
End: 2019-04-17
Payer: MEDICAID

## 2019-04-17 ENCOUNTER — HOSPITAL ENCOUNTER (OUTPATIENT)
Dept: CARDIAC REHAB | Age: 59
Discharge: HOME | End: 2019-04-17
Payer: MEDICAID

## 2019-04-17 VITALS — BODY MASS INDEX: 27.25 KG/M2 | WEIGHT: 184 LBS | HEIGHT: 69 IN

## 2019-04-17 PROCEDURE — 93798 PHYS/QHP OP CAR RHAB W/ECG: CPT

## 2019-04-17 NOTE — CARDIO/PULMONARY
Cardiopulmonary Rehab Intake Note: 
Met with Luis A Chacha Nash for initial cardiac visit. Mr. Marsha Gamez is a 60year old patient of Dr. Gerri Simmons who presents with a PCI dated 1/23/2019. History of HTN, Cardiomyopathy, CAD, PVD, GERD, adenoma or right adrenal gland, COPD and extensive orthopedic surgery due to accidents he would not elaborate on. LVEF 25% 
  
Limitations to exercise are primarily related to deconditioning  
   
He currently is not exercising at home.   
   
He competed the 6 minute walk test on the treadmill, walking 130 meters, mets 1.6, RPE 13 and RPD 0.  He denied any symptoms during his 6 min walk test  
   
Psychosocial: Pt scored 16 on PHQ9. This was faxed to his PCP. He is retired and not . He is close with his sister and she helps him when necessary. He has an extensive orthopedic history and just kept saying accidents when I asked about his surgeries. He uses a cane and has an unsteady gait. Fall risk sticker applies to his pouch. He smokes and does not have any interest in quitting. Smoking cessation materials were provided. Extensive time spent educating him on his blood thinners as he informed me he would probably be stopping them because \" I lose a pint every time I cut myself and I know I only have a few pints. \" Explained importance of medication to aid stents in working. Patient repeatedly stated he was going to bleed out. Explained he was being monitored by his cardiologist and how lab work would watch this. Again stressed importance of blood thinners and brilinta.  
   
Patient was given an overview of cardiac rehab program, placement of electrode/leads, and rationale for program. Patient viewed the cardiac rehab DVD and an education notebook was given.  
   
Heart rhythm on the monitor was SB/SR. Oxygen saturation was 97% on room air. BMI 27.23 Patient's identified goals are 1. To feel less anxious. 2. Start exercising regularly

## 2019-04-22 ENCOUNTER — APPOINTMENT (OUTPATIENT)
Dept: CARDIAC REHAB | Age: 59
End: 2019-04-22
Payer: MEDICAID

## 2019-05-24 ENCOUNTER — OFFICE VISIT (OUTPATIENT)
Dept: INTERNAL MEDICINE CLINIC | Facility: CLINIC | Age: 59
End: 2019-05-24

## 2019-05-24 VITALS
HEART RATE: 66 BPM | RESPIRATION RATE: 12 BRPM | SYSTOLIC BLOOD PRESSURE: 126 MMHG | WEIGHT: 183.5 LBS | HEIGHT: 69 IN | OXYGEN SATURATION: 97 % | TEMPERATURE: 97.6 F | DIASTOLIC BLOOD PRESSURE: 80 MMHG | BODY MASS INDEX: 27.18 KG/M2

## 2019-05-24 DIAGNOSIS — K21.9 GASTROESOPHAGEAL REFLUX DISEASE WITHOUT ESOPHAGITIS: ICD-10-CM

## 2019-05-24 DIAGNOSIS — R73.02 GLUCOSE INTOLERANCE (IMPAIRED GLUCOSE TOLERANCE): ICD-10-CM

## 2019-05-24 DIAGNOSIS — I25.118 CORONARY ARTERY DISEASE OF NATIVE ARTERY OF NATIVE HEART WITH STABLE ANGINA PECTORIS (HCC): Primary | ICD-10-CM

## 2019-05-24 DIAGNOSIS — I25.5 ISCHEMIC CARDIOMYOPATHY: ICD-10-CM

## 2019-05-24 DIAGNOSIS — E78.2 HYPERLIPEMIA, MIXED: ICD-10-CM

## 2019-05-24 DIAGNOSIS — I10 HYPERTENSION, ESSENTIAL: ICD-10-CM

## 2019-05-24 DIAGNOSIS — F17.210 MODERATE CIGARETTE SMOKER (10-19 PER DAY): ICD-10-CM

## 2019-05-24 DIAGNOSIS — E66.3 OVERWEIGHT (BMI 25.0-29.9): ICD-10-CM

## 2019-05-24 DIAGNOSIS — I73.9 PERIPHERAL VASCULAR DISEASE WITH CLAUDICATION (HCC): ICD-10-CM

## 2019-05-24 DIAGNOSIS — J44.9 CHRONIC OBSTRUCTIVE PULMONARY DISEASE, UNSPECIFIED COPD TYPE (HCC): ICD-10-CM

## 2019-05-24 LAB — HBA1C MFR BLD HPLC: 5.7 %

## 2019-05-24 RX ORDER — BUSPIRONE HYDROCHLORIDE 5 MG/1
5 TABLET ORAL
COMMUNITY
Start: 2019-05-16 | End: 2019-09-17 | Stop reason: SDUPTHER

## 2019-05-24 NOTE — PROGRESS NOTES
HISTORY OF PRESENT ILLNESS  Orevangelina Vaz is a 60 y.o. male.)  HPI  He presents for follow up of hypertension, hyperlipidemia, coronary artery disease, status post coronary artery stenting (Jan 2019 LAD and Cx and PCI RCA), ischemic cardiomyopathy, peripheral vascular disease and glucose intolerance. He had cardiac rehab intake visit on April 17, but did not return for any other sessions. He also did not show for follow up appointment with Dr Luther Danielle on April 23. There was to be further treatment of PVD. He has history of non-compliance. Diet and Lifestyle: generally follows a low fat low cholesterol diet, not attempting to follow a low sodium diet, does not rigorously follow a diabetic diet, sedentary, smoker 0.5 ppd  Medication compliance: compliant all of the time  Medication side effects: none  Home BP Monitoring: not done  Cardiovascular ROS:  He complains of claudication. At 75 feet. He denies palpitations, orthopnea, exertional chest pressure/discomfort, lower extremity edema, dyspnea on exertion, dizziness     He is being seen for follow up of COPD. He is taking medications as instructed, no medication side effects noted, no significant ongoing wheezing or shortness of breath, using bronchodilator MDI less than twice a week. Uses rescue inhaler:0  times /week      He is seen for followup of depression and anxiety. Current therapy includes: duloxetine 60 mg. With therapy symptoms are improved  He denies depressed mood, anhedonia, insomnia, feelings of worthlessness/guilt, difficulty concentrating, hopelessness and recurrent thoughts of death, fear of impending doom, feelings of apprehension/worry, racing thoughts and restlessness. He  experiences the following side effects from the treatment: none. He presents for follow up of gastroesophageal reflux. He denies dysphagia, has not lost weight, denies heartburn.       Patient Active Problem List   Diagnosis Code    Adenoma of right adrenal gland D35.01    Pulmonary nodules/lesions, multiple R91.8    GERD (gastroesophageal reflux disease) K21.9    Post-traumatic osteoarthritis of left knee M17.32    COPD (chronic obstructive pulmonary disease) ([facility]) J44.9    Depression with anxiety F41.8    Hyperlipemia, mixed E78.2    Hypertension, essential I10    Noncompliance with medication regimen Z91.14    Glucose intolerance (impaired glucose tolerance) R73.02    Moderate cigarette smoker (10-19 per day) F17.210    Cardiomyopathy ([facility] I42.9    Coronary artery disease of native artery with stable angina pectoris ([facility]) I25.118    Peripheral vascular disease with claudication ([facility]) I73.9     Past Medical History:   Diagnosis Date    Adrenal mass, right ([facility] 2014    Seen on abd CT    CAD (coronary artery disease)     calcific atherosclerosis on CT    Depression     Hypercholesterolemia     Hypertension     Multiple lung nodules      Past Surgical History:   Procedure Laterality Date    HX ORTHOPAEDIC      hand    HX ORTHOPAEDIC      foot     Social History     Socioeconomic History    Marital status: SINGLE     Spouse name: Not on file    Number of children: 0    Years of education: 15    Highest education level: 12th grade   Occupational History    Occupation: retired   Social Needs    Financial resource strain: Not hard at all   Carolyne-Bita insecurity:     Worry: Never true     Inability: Never true    Transportation needs:     Medical: Yes     Non-medical: Yes   Tobacco Use    Smoking status: Current Every Day Smoker     Packs/day: 0.50     Types: Cigarettes    Smokeless tobacco: Never Used   Substance and Sexual Activity    Alcohol use: No     Alcohol/week: 0.0 oz     Comment: stopped jan 2014    Drug use: No   Lifestyle    Physical activity:     Days per week: 0 days     Minutes per session: 0 min    Stress:  Only a little   Relationships    Social connections:     Talks on phone: Never     Gets together: Never     Attends Yarsanism service: Never     Active member of club or organization: Yes     Attends meetings of clubs or organizations: Never     Relationship status:    Other Topics Concern     Service No    Blood Transfusions No    Caffeine Concern No    Occupational Exposure No    Hobby Hazards No    Sleep Concern No    Stress Concern No    Weight Concern No    Special Diet No    Back Care No    Exercise No    Bike Helmet No    Seat Belt No    Self-Exams No     Family History   Problem Relation Age of Onset    Heart Disease Mother     Asthma Mother     Elevated Lipids Mother     Hypertension Mother      Allergies   Allergen Reactions    Advil [Ibuprofen] Hives    Duloxetine Diarrhea    Fluoxetine Diarrhea    Lovastatin Diarrhea    Tylenol [Acetaminophen] Hives     Current Outpatient Medications   Medication Sig Dispense Refill    busPIRone (BUSPAR) 5 mg tablet Take 5 mg by mouth three (3) times daily (with meals).  rosuvastatin (CRESTOR) 40 mg tablet Take 1 Tab by mouth nightly. 90 Tab 3    lisinopril-hydroCHLOROthiazide (PRINZIDE, ZESTORETIC) 20-12.5 mg per tablet Take 2 Tabs by mouth daily. 180 Tab 1    DULoxetine (CYMBALTA) 60 mg capsule Take 1 Cap by mouth daily. 90 Cap 3    isosorbide mononitrate ER (IMDUR) 30 mg tablet Take 1 Tab by mouth daily. 90 Tab 3    metoprolol succinate (TOPROL-XL) 25 mg XL tablet Take 1 Tab by mouth nightly. 90 Tab 3    omeprazole (PRILOSEC) 40 mg capsule Take 1 Cap by mouth daily as needed. 90 Cap 3    ticagrelor (BRILINTA) 90 mg tablet Take 1 Tab by mouth every twelve (12) hours every twelve (12) hours. 60 Tab 11    nitroglycerin (NITROSTAT) 0.4 mg SL tablet 1 Tab by SubLINGual route every five (5) minutes as needed for Chest Pain. Up to 3 doses. 1 Bottle 1    albuterol (PROVENTIL HFA, VENTOLIN HFA, PROAIR HFA) 90 mcg/actuation inhaler Take 1 Puff by inhalation every four (4) hours as needed for Wheezing.  1 Inhaler 2    acetaminophen (TYLENOL EXTRA STRENGTH) 500 mg tablet Take  by mouth every six (6) hours as needed for Pain.  aspirin delayed-release 81 mg tablet Take 1 Tab by mouth daily. 100 Tab 1       Review of Systems   Constitutional: Negative for malaise/fatigue and weight loss. Gastrointestinal: Negative for constipation and diarrhea. Musculoskeletal: Positive for joint pain (left). Negative for back pain. Neurological: Negative for tingling and focal weakness. Visit Vitals  /80 (BP 1 Location: Left arm, BP Patient Position: Sitting)   Pulse 66   Temp 97.6 °F (36.4 °C) (Oral)   Resp 12   Ht 5' 9\" (1.753 m)   Wt 183 lb 8 oz (83.2 kg)   SpO2 97%   BMI 27.10 kg/m²     Physical Exam   Constitutional: He is oriented to person, place, and time. He appears well-developed and well-nourished. HENT:   Head: Normocephalic and atraumatic. Eyes: Pupils are equal, round, and reactive to light. Conjunctivae are normal.   Neck: Neck supple. Carotid bruit is not present. No thyromegaly present. Cardiovascular: Normal rate, regular rhythm and normal heart sounds. PMI is not displaced. Exam reveals no gallop. No murmur heard. Pulses:       Dorsalis pedis pulses are 0 on the right side, and 0 on the left side. Posterior tibial pulses are 0 on the right side, and 0 on the left side. Pulmonary/Chest: Effort normal. He has no wheezes. He has no rhonchi. He has no rales. Abdominal: Soft. Normal appearance. He exhibits no abdominal bruit and no mass. There is no hepatosplenomegaly. There is no tenderness. Musculoskeletal: He exhibits no edema. Lymphadenopathy:     He has no cervical adenopathy. Right: No supraclavicular adenopathy present. Left: No supraclavicular adenopathy present. Neurological: He is alert and oriented to person, place, and time. No sensory deficit. Skin: Skin is warm, dry and intact. No rash noted. Psychiatric: He has a normal mood and affect.  His behavior is normal.   Nursing note and vitals reviewed. Lab Results   Component Value Date/Time    Cholesterol, total 302 (H) 04/18/2017 10:04 AM    HDL Cholesterol 46 04/18/2017 10:04 AM    LDL,Direct 181 (H) 10/25/2018 01:40 PM    LDL, calculated 227 (H) 04/18/2017 10:04 AM    VLDL, calculated 29 04/18/2017 10:04 AM    Triglyceride 144 04/18/2017 10:04 AM     Lab Results   Component Value Date/Time    Sodium 141 01/22/2019 12:56 PM    Potassium 4.4 01/22/2019 12:56 PM    Chloride 102 01/22/2019 12:56 PM    CO2 24 01/22/2019 12:56 PM    Anion gap 8 12/01/2018 01:16 PM    Glucose 114 (H) 01/22/2019 12:56 PM    BUN 13 01/22/2019 12:56 PM    Creatinine 0.75 (L) 01/22/2019 12:56 PM    BUN/Creatinine ratio 17 01/22/2019 12:56 PM    GFR est  01/22/2019 12:56 PM    GFR est non- 01/22/2019 12:56 PM    Calcium 9.8 01/22/2019 12:56 PM    Bilirubin, total 0.2 01/22/2019 12:56 PM    AST (SGOT) 17 01/22/2019 12:56 PM    Alk. phosphatase 103 01/22/2019 12:56 PM    Protein, total 7.1 01/22/2019 12:56 PM    Albumin 4.7 01/22/2019 12:56 PM    Globulin 3.3 12/01/2018 01:16 PM    A-G Ratio 2.0 01/22/2019 12:56 PM    ALT (SGPT) 19 01/22/2019 12:56 PM     Results for orders placed or performed in visit on 05/24/19   AMB POC HEMOGLOBIN A1C   Result Value Ref Range    Hemoglobin A1c (POC) 5.7 %     Lab Results   Component Value Date/Time    Hemoglobin A1c 5.7 (H) 04/18/2017 10:04 AM    Hemoglobin A1c (POC) 5.6 11/01/2018 08:28 AM       ASSESSMENT and PLAN    ICD-10-CM ICD-9-CM    1. Coronary artery disease of native artery of native heart with stable angina pectoris ([facility] I25.118 414.01      413.9    2. Hypertension, essential I10 401.9    3. Hyperlipemia, mixed E78.2 272.2 LIPID PANEL      METABOLIC PANEL, COMPREHENSIVE   4. Glucose intolerance (impaired glucose tolerance) R73.02 790.22 AMB POC HEMOGLOBIN A1C   5. Ischemic cardiomyopathy I25.5 414.8    6. Peripheral vascular disease with claudication (HCC) I73.9 443.9    7.  Chronic obstructive pulmonary disease, unspecified COPD type ([facility] J44.9 496    8. Gastroesophageal reflux disease without esophagitis K21.9 530.81    9. Moderate cigarette smoker (10-19 per day) F17.210 305.1    10. Overweight                                            E66.3        278.02      Diagnoses and all orders for this visit:    1. Coronary artery disease of native artery of native heart with stable angina pectoris ([facility]  Stable taking antiplatelet agent and statin. However he stopped ASA. Instructed to resume. 2. Hypertension, essential  Hypertension is controlled. 3. Hyperlipemia, mixed  Hyperlipidemia control is uncertain; has not been checked since starting rosuvastatin. -     LIPID PANEL  -     METABOLIC PANEL, COMPREHENSIVE    4. Glucose intolerance (impaired glucose tolerance)  A1c is stable  -     AMB POC HEMOGLOBIN A1C    5. Ischemic cardiomyopathy  No HF    6. Peripheral vascular disease with claudication ([facility]  Encouraged to follow up with cardiology. 7. Chronic obstructive pulmonary disease, unspecified COPD type ([facility]  Has decreased smoking,but has some wheeezing. 8. Gastroesophageal reflux disease without esophagitis  Symptoms controlled. 9. Moderate cigarette smoker (10-19 per day)  Encouraged to stop smoking to decrease risk of cancer, pulmonary, and cardiovascular disease. 10. Overweight  Discussed using smaller plate for portion control, keeping a food diary for awareness of food consumed, checking weight often, and increasing physical activity. Will re-evaluate next visit. Follow-up and Dispositions    · Return in about 6 months (around 11/24/2019) for CAD, HTN, chol, gluc, POC A1c.       lab results and schedule of future lab studies reviewed with patient  reviewed diet, exercise and weight control  I have discussed the diagnosis, evaluation and treatment options and the intended plan with the patient. Patient understands and is in agreement.   The patient has received an after-visit summary and questions were answered concerning future plans. I have discussed side effects and warnings of any new medications with the patient as well.

## 2019-05-24 NOTE — PATIENT INSTRUCTIONS
You need to take aspirin 81 mg daily along with the Brillinta. Make another appointment with Dr Quincy Ledezma. Office visit in 6 months with fasting lab work a week before visit. Peripheral Arterial Disease of the Leg: Care Instructions Your Care Instructions Peripheral arterial disease (PAD) occurs when the blood vessels (arteries) that supply blood to the legs, belly, pelvis, arms, or neck get too narrow. This reduces blood flow to that area. The legs are affected most often. Fatty buildup (plaque) in the arteries usually is the cause of PAD. This buildup is also called \"hardening\" of the arteries. Your risk of PAD increases if you smoke or have high cholesterol, high blood pressure, diabetes, or a family history of PAD. Many people do not have symptoms. If you do have symptoms, you may have weak or tired legs, difficulty walking or balancing, or pain. If you have pain, you might feel a tight, aching, or squeezing pain in the calf, foot, thigh, or buttock that occurs during exercise. The pain usually gets worse during exercise and goes away when you rest. If PAD gets worse, you may have symptoms of poor blood flow such as leg pain when you rest. 
Medicines and lifestyle changes may help your symptoms and lower your risk of heart attack and stroke. In some cases, surgery or other treatment is needed. It is important that you follow up with your doctor. Follow-up care is a key part of your treatment and safety. Be sure to make and go to all appointments, and call your doctor if you are having problems. It's also a good idea to know your test results and keep a list of the medicines you take. How can you care for yourself at home? · Do not smoke. Smoking can make PAD worse. If you need help quitting, talk to your doctor about stop-smoking programs and medicines. These can increase your chances of quitting for good. · Take your medicines exactly as prescribed.  Call your doctor if you think you are having a problem with your medicine. · If you take a blood thinner, such as aspirin, be sure to get instructions about how to take your medicine safely. Blood thinners can cause serious bleeding problems. · Ask your doctor if a cardiac rehab program is right for you. Cardiac rehab can help you make lifestyle changes. In cardiac rehab, a team of health professionals provides education and support to help you make new, healthy habits. · Eat heart-healthy foods such as fruits, vegetables, whole grains, fish, lean meats, and low-fat or nonfat dairy foods. Limit sodium, sugar, and alcohol. · If your doctor recommends it, get more exercise. Walking is a good choice. Bit by bit, increase the amount you walk every day. Try for at least 30 minutes on most days of the week. If you have symptoms when you exercise, ask your doctor about a special exercise program that may help relieve your symptoms. · Stay at a healthy weight. Lose weight if you need to. · Take good care of your feet. ? Treat cuts and scrapes on your legs right away. Poor blood flow prevents (or slows) quick healing of even small cuts or scrapes. This is even more important if you have diabetes. ? Avoid shoes that are too tight or that rub your feet. Shoes should be comfortable and fit well. ? Avoid socks or stockings that are tight enough to leave elastic-band marks on your legs. Tight socks can make circulation problems worse. ? Keep your feet clean and moisturized to prevent drying and cracking. Place cotton or lamb's wool between your toes to prevent rubbing and to absorb moisture. ? If you have a sore on your leg or foot, keep it dry and cover it with a nonstick bandage until you see your doctor. When should you call for help? Call 911 anytime you think you may need emergency care. For example, call if: 
  · You have symptoms of a heart attack. These may include: 
?  Chest pain or pressure, or a strange feeling in the chest. 
 ? Sweating. ? Shortness of breath. ? Nausea or vomiting. ? Pain, pressure, or a strange feeling in the back, neck, jaw, or upper belly or in one or both shoulders or arms. ? Lightheadedness or sudden weakness. ? A fast or irregular heartbeat.  
 After you call 911, the  may tell you to chew 1 adult-strength or 2 to 4 low-dose aspirin. Wait for an ambulance. Do not try to drive yourself. 
  · You have sudden, severe leg pain, and your leg is cool and pale.  
  · You have symptoms of a stroke. These may include: 
? Sudden numbness, tingling, weakness, or loss of movement in your face, arm, or leg, especially on only one side of your body. ? Sudden vision changes. ? Sudden trouble speaking. ? Sudden confusion or trouble understanding simple statements. ? Sudden problems with walking or balance. ? A sudden, severe headache that is different from past headaches.  
 Call your doctor now or seek immediate medical care if: 
  · You have leg pain that does not go away even if you rest.  
  · Your leg pain changes or gets worse. For example, if you have more pain with normal activity or the same pain with decreased activity, you should call.  
  · You have cold or numb feet or toes.  
  · You have leg or foot sores that are slow to heal.  
  · The skin on your legs or feet changes color.  
  · You have an open sore on your leg or foot that is infected. Signs of infection include: ? Increased pain, swelling, warmth, or redness. ? Red streaks leading from the sore. ? Pus draining from the sore. ? A fever.  
 Watch closely for changes in your health, and be sure to contact your doctor if you have any problems. Where can you learn more? Go to http://nehemiah-nadia.info/. Enter [phone] in the search box to learn more about \"Peripheral Arterial Disease of the Leg: Care Instructions. \" Current as of: July 22, 2018 Content Version: 11.9 © 1303-9361 [employer]. Care instructions adapted under license by [employer] (which disclaims liability or warranty for this information). If you have questions about a medical condition or this instruction, always ask your healthcare professional. [address] any warranty or liability for your use of this information.

## 2019-05-24 NOTE — PROGRESS NOTES
Yue Anderson  Identified pt with two pt identifiers(name and ). Chief Complaint   Patient presents with    Hypertension    Coronary Artery Disease    Blood sugar problem       Reviewed record In preparation for visit and have obtained necessary documentation. Has info on advanced directive but has not filled them out. 1. Have you been to the ER, urgent care clinic or hospitalized since your last visit? No     2. Have you seen or consulted any other health care providers outside of the [address] [place] [address] since your last visit? Include any pap smears or colon screening. Cardiac rehah    Vitals reviewed with provider.     Health Maintenance reviewed:     Health Maintenance Due   Topic    Shingrix Vaccine Age 50> (1 of 2)          Wt Readings from Last 3 Encounters:   19 183 lb 8 oz (83.2 kg)   19 184 lb (83.5 kg)   19 185 lb (83.9 kg)        Temp Readings from Last 3 Encounters:   19 97.6 °F (36.4 °C) (Oral)   19 98.2 °F (36.8 °C) (Oral)   19 97.7 °F (36.5 °C)        BP Readings from Last 3 Encounters:   19 126/80   19 168/88   19 113/62        Pulse Readings from Last 3 Encounters:   19 66   19 89   19 61        Vitals:    19 0847   BP: 126/80   Pulse: 66   Resp: 12   Temp: 97.6 °F (36.4 °C)   TempSrc: Oral   SpO2: 97%   Weight: 183 lb 8 oz (83.2 kg)   Height: 5' 9\" (1.753 m)   PainSc:   0 - No pain          Learning Assessment:   :       Learning Assessment 2014   PRIMARY LEARNER Patient   HIGHEST LEVEL OF EDUCATION - PRIMARY LEARNER  DID NOT GRADUATE HIGH SCHOOL   BARRIERS PRIMARY LEARNER READING   CO-LEARNER CAREGIVER No   PRIMARY LANGUAGE ENGLISH   LEARNER PREFERENCE PRIMARY DEMONSTRATION   ANSWERED BY patient   RELATIONSHIP SELF        Depression Screening:   :       3 most recent PHQ Screens 2019   Little interest or pleasure in doing things Nearly every day   Feeling down, depressed, irritable, or hopeless Nearly every day   Total Score PHQ 2 6   Trouble falling or staying asleep, or sleeping too much More than half the days   Feeling tired or having little energy Nearly every day   Poor appetite, weight loss, or overeating Nearly every day   Feeling bad about yourself - or that you are a failure or have let yourself or your family down Several days   Trouble concentrating on things such as school, work, reading, or watching TV Not at all   Moving or speaking so slowly that other people could have noticed; or the opposite being so fidgety that others notice Several days   Thoughts of being better off dead, or hurting yourself in some way Not at all   PHQ 9 Score 16   How difficult have these problems made it for you to do your work, take care of your home and get along with others -        Fall Risk Assessment:   :     No flowsheet data found. Abuse Screening:   :     No flowsheet data found.      ADL Screening:   :       ADL Assessment 10/31/2014   Feeding yourself No Help Needed   Getting from bed to chair No Help Needed   Getting dressed No Help Needed   Bathing or showering No Help Needed   Walk across the room (includes cane/walker) No Help Needed   Using the telphone No Help Needed   Taking your medications No Help Needed   Preparing meals No Help Needed   Managing money (expenses/bills) No Help Needed   Moderately strenuous housework (laundry) No Help Needed   Shopping for personal items (toiletries/medicines) No Help Needed   Shopping for groceries No Help Needed   Driving Help Needed   Climbing a flight of stairs No Help Needed   Getting to places beyond walking distances No Help Needed

## 2019-05-25 LAB
ALBUMIN SERPL-MCNC: 4.7 G/DL (ref 3.5–5.5)
ALBUMIN/GLOB SERPL: 2.4 {RATIO} (ref 1.2–2.2)
ALP SERPL-CCNC: 105 IU/L (ref 39–117)
ALT SERPL-CCNC: 33 IU/L (ref 0–44)
AST SERPL-CCNC: 18 IU/L (ref 0–40)
BILIRUB SERPL-MCNC: 0.2 MG/DL (ref 0–1.2)
BUN SERPL-MCNC: 20 MG/DL (ref 6–24)
BUN/CREAT SERPL: 26 (ref 9–20)
CALCIUM SERPL-MCNC: 10 MG/DL (ref 8.7–10.2)
CHLORIDE SERPL-SCNC: 102 MMOL/L (ref 96–106)
CHOLEST SERPL-MCNC: 150 MG/DL (ref 100–199)
CO2 SERPL-SCNC: 24 MMOL/L (ref 20–29)
CREAT SERPL-MCNC: 0.76 MG/DL (ref 0.76–1.27)
GLOBULIN SER CALC-MCNC: 2 G/DL (ref 1.5–4.5)
GLUCOSE SERPL-MCNC: 90 MG/DL (ref 65–99)
HDLC SERPL-MCNC: 55 MG/DL
LDLC SERPL CALC-MCNC: 69 MG/DL (ref 0–99)
POTASSIUM SERPL-SCNC: 4.3 MMOL/L (ref 3.5–5.2)
PROT SERPL-MCNC: 6.7 G/DL (ref 6–8.5)
SODIUM SERPL-SCNC: 139 MMOL/L (ref 134–144)
TRIGL SERPL-MCNC: 130 MG/DL (ref 0–149)
VLDLC SERPL CALC-MCNC: 26 MG/DL (ref 5–40)

## 2019-05-31 ENCOUNTER — TELEPHONE (OUTPATIENT)
Dept: CARDIAC REHAB | Age: 59
End: 2019-05-31

## 2019-05-31 NOTE — TELEPHONE ENCOUNTER
Called Luis A Sloan Rice who has not attended cardiac rehab since 4/17/19. Called pt who indicates he needs to see Dr. Saji Elam but he does plan to come back to rehab. Chart indicates pt having issues with PAD.

## 2019-07-22 ENCOUNTER — TELEPHONE (OUTPATIENT)
Dept: CARDIAC REHAB | Age: 59
End: 2019-07-22

## 2019-07-22 NOTE — TELEPHONE ENCOUNTER
Pt is being discharged from outpatient cardiac rehab program.  Has been called but delayed return but did not call or show. He has not attended since intake on 4/17/19.

## 2019-09-16 DIAGNOSIS — F41.9 ANXIETY: ICD-10-CM

## 2019-09-17 RX ORDER — BUSPIRONE HYDROCHLORIDE 5 MG/1
TABLET ORAL
Qty: 90 TAB | Refills: 5 | Status: SHIPPED | OUTPATIENT
Start: 2019-09-17 | End: 2020-08-19

## 2019-10-09 DIAGNOSIS — I10 HYPERTENSION, ESSENTIAL: ICD-10-CM

## 2019-10-09 RX ORDER — LISINOPRIL AND HYDROCHLOROTHIAZIDE 12.5; 2 MG/1; MG/1
TABLET ORAL
Qty: 180 TAB | Refills: 1 | Status: SHIPPED | OUTPATIENT
Start: 2019-10-09 | End: 2020-10-01

## 2019-11-25 NOTE — PROGRESS NOTES
HISTORY OF PRESENT ILLNESS  Deniz Segundo is a 60 y.o. male. HPI  He presents for follow up of hypertension, hyperlipidemia, coronary artery disease, status post coronary artery stenting, ischemic cardiomyopathy, peripheral vascular disease,glucose intolerance and overweight. He is overdue for cardiology follow up. Diet and Lifestyle: generally follows a low fat low cholesterol diet, generally follows a low sodium diet, does not rigorously follow a diabetic diet, sedentary, smoker 0.5 ppd  Medication compliance: compliant all of the time  Medication side effects: none  Home BP Monitoring: not done  Cardiovascular ROS:  He complains of claudication. Left leg at 100 feet. He denies palpitations, orthopnea, exertional chest pressure/discomfort, lower extremity edema, dyspnea on exertion, dizziness       He is being seen for follow up of COPD. He is taking medications as instructed, no medication side effects noted, no significant ongoing wheezing or shortness of breath, using bronchodilator MDI less than twice a week. He presents for follow up of gastroesophageal reflux. He denies dysphagia or heartburn. He is seen for followup of depression and anxiety. Current therapy includes: duloxetine and buspirone. . With therapy symptoms are improved  Ongoing symptoms include feelings of apprehension/worry    He denies depressed mood, anhedonia, feelings of worthlessness/guilt, difficulty concentrating, hopelessness and recurrent thoughts of death, irrational fears and racing thoughts. He  experiences the following side effects from the treatment: none. He complains of pain in left ankle and foot since fall off ladder several years ago. He wants to see some who specializes in foot and ankle problems.        Patient Active Problem List   Diagnosis Code    Adenoma of right adrenal gland D35.01    Pulmonary nodules/lesions, multiple R91.8    GERD (gastroesophageal reflux disease) K21.9    Post-traumatic osteoarthritis of left knee M17.32    COPD (chronic obstructive pulmonary disease) ([facility]) J44.9    Depression with anxiety F41.8    Hyperlipemia, mixed E78.2    Hypertension, essential I10    Noncompliance with medication regimen Z91.14    Glucose intolerance (impaired glucose tolerance) R73.02    Moderate cigarette smoker (10-19 per day) F17.210    Cardiomyopathy ([facility] I42.9    Coronary artery disease of native artery with stable angina pectoris ([facility]) I25.118    Peripheral vascular disease with claudication ([facility]) I73.9    Overweight (BMI 25.0-29. 9) E66.3     Past Medical History:   Diagnosis Date    Adrenal mass, right ([facility] 2014    Seen on abd CT    CAD (coronary artery disease)     calcific atherosclerosis on CT    Depression     Hypercholesterolemia     Hypertension     Multiple lung nodules      Past Surgical History:   Procedure Laterality Date    HX ORTHOPAEDIC      hand    HX ORTHOPAEDIC      foot     Social History     Socioeconomic History    Marital status: SINGLE     Spouse name: Not on file    Number of children: 0    Years of education: 15    Highest education level: 12th grade   Occupational History    Occupation: retired   Social Needs    Financial resource strain: Not hard at all   [place] insecurity:     Worry: Never true     Inability: Never true    Transportation needs:     Medical: Yes     Non-medical: Yes   Tobacco Use    Smoking status: Current Every Day Smoker     Packs/day: 0.50     Types: Cigarettes    Smokeless tobacco: Never Used   Substance and Sexual Activity    Alcohol use: No     Alcohol/week: 0.0 standard drinks     Comment: stopped jan 2014    Drug use: No   Lifestyle    Physical activity:     Days per week: 0 days     Minutes per session: 0 min    Stress:  Only a little   Relationships    Social connections:     Talks on phone: Never     Gets together: Never     Attends [employer] service: Never     Active member of club or organization: Yes     Attends meetings of clubs or organizations: Never     Relationship status:    Other Topics Concern     Service No    Blood Transfusions No    Caffeine Concern No    Occupational Exposure No    Hobby Hazards No    Sleep Concern No    Stress Concern No    Weight Concern No    Special Diet No    Back Care No    Exercise No    Bike Helmet No    Seat Belt No    Self-Exams No     Family History   Problem Relation Age of Onset    Heart Disease Mother     Asthma Mother     Elevated Lipids Mother     Hypertension Mother      Allergies   Allergen Reactions    Advil [Ibuprofen] Hives    Duloxetine Diarrhea    Fluoxetine Diarrhea    Lovastatin Diarrhea    Tylenol [Acetaminophen] Hives     Current Outpatient Medications   Medication Sig Dispense Refill    lisinopril-hydroCHLOROthiazide (PRINZIDE, ZESTORETIC) 20-12.5 mg per tablet TAKE 2 TABLETS BY MOUTH DAILY 180 Tab 1    busPIRone (BUSPAR) 5 mg tablet TAKE 1 TABLET BY MOUTH THREE TIMES DAILY WITH MEALS 90 Tab 5    rosuvastatin (CRESTOR) 40 mg tablet Take 1 Tab by mouth nightly. 90 Tab 3    DULoxetine (CYMBALTA) 60 mg capsule Take 1 Cap by mouth daily. 90 Cap 3    isosorbide mononitrate ER (IMDUR) 30 mg tablet Take 1 Tab by mouth daily. 90 Tab 3    metoprolol succinate (TOPROL-XL) 25 mg XL tablet Take 1 Tab by mouth nightly. 90 Tab 3    omeprazole (PRILOSEC) 40 mg capsule Take 1 Cap by mouth daily as needed. 90 Cap 3    ticagrelor (BRILINTA) 90 mg tablet Take 1 Tab by mouth every twelve (12) hours every twelve (12) hours. 60 Tab 11    aspirin delayed-release 81 mg tablet Take 1 Tab by mouth daily. 100 Tab 1    nitroglycerin (NITROSTAT) 0.4 mg SL tablet 1 Tab by SubLINGual route every five (5) minutes as needed for Chest Pain. Up to 3 doses. 1 Bottle 1    albuterol (PROVENTIL HFA, VENTOLIN HFA, PROAIR HFA) 90 mcg/actuation inhaler Take 1 Puff by inhalation every four (4) hours as needed for Wheezing.  1 Inhaler 2    acetaminophen (TYLENOL EXTRA STRENGTH) 500 mg tablet Take  by mouth every six (6) hours as needed for Pain. Review of Systems   Constitutional: Negative for malaise/fatigue and weight loss. Gastrointestinal: Negative for constipation and diarrhea. Musculoskeletal: Positive for joint pain (knee, left, foot left ). Negative for back pain. Neurological: Negative for tingling and focal weakness. Visit Vitals  /86 (BP 1 Location: Left arm, BP Patient Position: Sitting)   Pulse 60   Temp 98 °F (36.7 °C) (Oral)   Resp 16   Ht 5' 9\" (1.753 m)   Wt 189 lb (85.7 kg)   SpO2 98%   BMI 27.91 kg/m²     Physical Exam  Vitals signs and nursing note reviewed. Constitutional:       Appearance: Normal appearance. He is obese. HENT:      Head: Normocephalic and atraumatic. Mouth/Throat:      Lips: Pink. Mouth: Mucous membranes are moist.   Eyes:      General: Lids are normal. Gaze aligned appropriately. Extraocular Movements: Extraocular movements intact. Conjunctiva/sclera: Conjunctivae normal.   Neck:      Musculoskeletal: Neck supple. Thyroid: No thyroid mass or thyromegaly. Vascular: No carotid bruit. Trachea: Trachea and phonation normal.   Cardiovascular:      Rate and Rhythm: Normal rate and regular rhythm. Chest Wall: PMI is not displaced. Pulses:           Dorsalis pedis pulses are 2+ on the right side and 2+ on the left side. Posterior tibial pulses are 2+ on the right side and 2+ on the left side. Heart sounds: S1 normal and S2 normal. No murmur. No gallop. Pulmonary:      Effort: Pulmonary effort is normal.      Breath sounds: Normal breath sounds and air entry. No wheezing, rhonchi or rales. Abdominal:      General: Abdomen is flat. Bowel sounds are normal. There is no distension or abdominal bruit. Palpations: Abdomen is soft. There is no hepatomegaly, splenomegaly or mass. Tenderness: There is no tenderness.    Musculoskeletal:      Right lower leg: No edema. Left lower leg: No edema. Lymphadenopathy:      Cervical: No cervical adenopathy. Skin:     General: Skin is warm and dry. Findings: No rash. Neurological:      General: No focal deficit present. Mental Status: He is alert and oriented to person, place, and time. Sensory: Sensation is intact. Gait: Gait is intact. Psychiatric:         Attention and Perception: Attention and perception normal.         Mood and Affect: Mood normal.         Speech: Speech normal.         Behavior: Behavior normal.       Results for orders placed or performed in visit on 11/26/19   AMB POC HEMOGLOBIN A1C   Result Value Ref Range    Hemoglobin A1c (POC) 5.2 %       Results for orders placed or performed in visit on 05/24/19   LIPID PANEL   Result Value Ref Range    Cholesterol, total 150 100 - 199 mg/dL    Triglyceride 130 0 - 149 mg/dL    HDL Cholesterol 55 >39 mg/dL    VLDL, calculated 26 5 - 40 mg/dL    LDL, calculated 69 0 - 99 mg/dL   METABOLIC PANEL, COMPREHENSIVE   Result Value Ref Range    Glucose 90 65 - 99 mg/dL    BUN 20 6 - 24 mg/dL    Creatinine 0.76 0.76 - 1.27 mg/dL    GFR est non- >59 mL/min/1.73    GFR est  >59 mL/min/1.73    BUN/Creatinine ratio 26 (H) 9 - 20    Sodium 139 134 - 144 mmol/L    Potassium 4.3 3.5 - 5.2 mmol/L    Chloride 102 96 - 106 mmol/L    CO2 24 20 - 29 mmol/L    Calcium 10.0 8.7 - 10.2 mg/dL    Protein, total 6.7 6.0 - 8.5 g/dL    Albumin 4.7 3.5 - 5.5 g/dL    GLOBULIN, TOTAL 2.0 1.5 - 4.5 g/dL    A-G Ratio 2.4 (H) 1.2 - 2.2    Bilirubin, total 0.2 0.0 - 1.2 mg/dL    Alk. phosphatase 105 39 - 117 IU/L    AST (SGOT) 18 0 - 40 IU/L    ALT (SGPT) 33 0 - 44 IU/L   AMB POC HEMOGLOBIN A1C   Result Value Ref Range    Hemoglobin A1c (POC) 5.7 %         ASSESSMENT and PLAN    ICD-10-CM ICD-9-CM    1. Hypertension, essential I10 401.9 amLODIPine (NORVASC) 2.5 mg tablet   2. Hyperlipemia, mixed E78.2 272.2    3.  Glucose intolerance (impaired glucose tolerance) R73.02 790.22 AMB POC HEMOGLOBIN A1C   4. Coronary artery disease of native artery of native heart with stable angina pectoris ([facility] I25.118 414.01 REFERRAL TO CARDIOLOGY     413.9    5. Ischemic cardiomyopathy I25.5 414.8 REFERRAL TO CARDIOLOGY   6. Peripheral vascular disease with claudication ([facility]) I73.9 443.9 REFERRAL TO CARDIOLOGY   7. Moderate cigarette smoker (10-19 per day) F17.210 305.1    8. Gastroesophageal reflux disease without esophagitis K21.9 530.81    9. Depression with anxiety F41.8 300.4    10. Overweight (BMI 25.0-29. 9) E66.3 278.02    11. Encounter for immunization Z23 V03.89 INFLUENZA VIRUS VAC QUAD,SPLIT,PRESV FREE SYRINGE IM      GA IMMUNIZ ADMIN,1 SINGLE/COMB VAC/TOXOID   12. Pain of joint of left ankle and foot M25.572 719.47 REFERRAL TO ORTHOPEDICS     Diagnoses and all orders for this visit:    1. Hypertension, essential  Hypertension is not controlled. -    Add amLODIPine (NORVASC) 2.5 mg tablet; Take 1 Tab by mouth daily. 2. Hyperlipemia, mixed  Hyperlipidemia is controlled    3. Glucose intolerance (impaired glucose tolerance)  -     AMB POC HEMOGLOBIN A1C    4. Coronary artery disease of native artery of native heart with stable angina pectoris ([facility]  Stable taking antiplatelet agent and statin.   -     REFERRAL TO CARDIOLOGY    5. Ischemic cardiomyopathy  No signs or symptoms of HF  -     REFERRAL TO CARDIOLOGY    6. Peripheral vascular disease with claudication ([facility]  Still with claudication in left leg. Was to be addressed after coronary problems.   -     REFERRAL TO CARDIOLOGY    7. Moderate cigarette smoker (10-19 per day)  Encouraged to stop smoking to decrease risk of cancer, pulmonary, and cardiovascular disease. 8. Gastroesophageal reflux disease without esophagitis  Symptoms controlled. 9. Depression with anxiety  Stable    10. Overweight (BMI 25.0-29. 9)  Discussed using smaller plate for portion control, keeping a food diary for awareness of food consumed, checking weight often, and increasing physical activity. Will re-evaluate next visit. 11. Encounter for immunization  -     INFLUENZA VIRUS VAC QUAD,SPLIT,PRESV FREE SYRINGE IM  -     DC IMMUNIZ ADMIN,1 SINGLE/COMB VAC/TOXOID    12. Pain of joint of left ankle and foot  -     REFERRAL TO ORTHOPEDICS        Return in 6 weeks HTN  and 6 months HTN, chol, gluc, CAD  Fasting lab one week prior  lab results and schedule of future lab studies reviewed with patient  reviewed diet, exercise and weight control  I have discussed the diagnosis, evaluation and treatment options and the intended plan with the patient. Patient understands and is in agreement. The patient has received an after-visit summary and questions were answered concerning future plans. I have discussed side effects and warnings of any new medications with the patient as well.

## 2019-11-26 ENCOUNTER — OFFICE VISIT (OUTPATIENT)
Dept: INTERNAL MEDICINE CLINIC | Facility: CLINIC | Age: 59
End: 2019-11-26

## 2019-11-26 VITALS
BODY MASS INDEX: 27.99 KG/M2 | HEIGHT: 69 IN | OXYGEN SATURATION: 98 % | SYSTOLIC BLOOD PRESSURE: 144 MMHG | WEIGHT: 189 LBS | TEMPERATURE: 98 F | DIASTOLIC BLOOD PRESSURE: 86 MMHG | HEART RATE: 60 BPM | RESPIRATION RATE: 16 BRPM

## 2019-11-26 DIAGNOSIS — R73.02 GLUCOSE INTOLERANCE (IMPAIRED GLUCOSE TOLERANCE): ICD-10-CM

## 2019-11-26 DIAGNOSIS — E66.3 OVERWEIGHT (BMI 25.0-29.9): ICD-10-CM

## 2019-11-26 DIAGNOSIS — F17.210 MODERATE CIGARETTE SMOKER (10-19 PER DAY): ICD-10-CM

## 2019-11-26 DIAGNOSIS — I25.118 CORONARY ARTERY DISEASE OF NATIVE ARTERY OF NATIVE HEART WITH STABLE ANGINA PECTORIS (HCC): ICD-10-CM

## 2019-11-26 DIAGNOSIS — M25.572 PAIN OF JOINT OF LEFT ANKLE AND FOOT: ICD-10-CM

## 2019-11-26 DIAGNOSIS — F41.8 DEPRESSION WITH ANXIETY: ICD-10-CM

## 2019-11-26 DIAGNOSIS — Z23 ENCOUNTER FOR IMMUNIZATION: ICD-10-CM

## 2019-11-26 DIAGNOSIS — I73.9 PERIPHERAL VASCULAR DISEASE WITH CLAUDICATION (HCC): ICD-10-CM

## 2019-11-26 DIAGNOSIS — E78.2 HYPERLIPEMIA, MIXED: ICD-10-CM

## 2019-11-26 DIAGNOSIS — K21.9 GASTROESOPHAGEAL REFLUX DISEASE WITHOUT ESOPHAGITIS: ICD-10-CM

## 2019-11-26 DIAGNOSIS — I10 HYPERTENSION, ESSENTIAL: Primary | ICD-10-CM

## 2019-11-26 DIAGNOSIS — I25.5 ISCHEMIC CARDIOMYOPATHY: ICD-10-CM

## 2019-11-26 LAB — HBA1C MFR BLD HPLC: 5.2 %

## 2019-11-26 RX ORDER — AMLODIPINE BESYLATE 2.5 MG/1
2.5 TABLET ORAL DAILY
Qty: 30 TAB | Refills: 11 | Status: SHIPPED | OUTPATIENT
Start: 2019-11-26 | End: 2020-01-13

## 2019-11-26 RX ORDER — NITROGLYCERIN 0.4 MG/1
TABLET SUBLINGUAL
Qty: 1 BOTTLE | Refills: 1 | Status: SHIPPED | OUTPATIENT
Start: 2019-11-26

## 2019-11-26 NOTE — PATIENT INSTRUCTIONS
Add amlodipine 2.5 mg once a day for blood pressure Vaccine Information Statement Influenza (Flu) Vaccine (Inactivated or Recombinant): What You Need to Know Many Vaccine Information Statements are available in [place] and other languages. See www.immunize.org/vis Hojas de información sobre vacunas están disponibles en español y en muchos otros idiomas. Visite www.immunize.org/vis 1. Why get vaccinated? Influenza vaccine can prevent influenza (flu). Flu is a contagious disease that spreads around the [place] every year, usually between October and May. Anyone can get the flu, but it is more dangerous for some people. Infants and young children, people 72years of age and older, pregnant women, and people with certain health conditions or a weakened immune system are at greatest risk of flu complications. Pneumonia, bronchitis, sinus infections and ear infections are examples of flu-related complications. If you have a medical condition, such as heart disease, cancer or diabetes, flu can make it worse. Flu can cause fever and chills, sore throat, muscle aches, fatigue, cough, headache, and runny or stuffy nose. Some people may have vomiting and diarrhea, though this is more common in children than adults. Each year thousands of people in the [facility] die from flu, and many more are hospitalized. Flu vaccine prevents millions of illnesses and flu-related visits to the doctor each year. 2. Influenza vaccines CDC recommends everyone 10months of age and older get vaccinated every flu season. Children 6 months through 6years of age may need 2 doses during a single flu season. Everyone else needs only 1 dose each flu season. It takes about 2 weeks for protection to develop after vaccination. There are many flu viruses, and they are always changing.  Each year a new flu vaccine is made to protect against three or four viruses that are likely to cause disease in the upcoming flu season. Even when the vaccine doesnt exactly match these viruses, it may still provide some protection. Influenza vaccine does not cause flu. Influenza vaccine may be given at the same time as other vaccines. 3. Talk with your health care provider Tell your vaccine provider if the person getting the vaccine: 
 Has had an allergic reaction after a previous dose of influenza vaccine, or has any severe, life-threatening allergies.  Has ever had Guillain-Barré Syndrome (also called GBS). In some cases, your health care provider may decide to postpone influenza vaccination to a future visit. People with minor illnesses, such as a cold, may be vaccinated. People who are moderately or severely ill should usually wait until they recover before getting influenza vaccine. Your health care provider can give you more information. 4. Risks of a reaction  Soreness, redness, and swelling where shot is given, fever, muscle aches, and headache can happen after influenza vaccine.  There may be a very small increased risk of Guillain-Barré Syndrome (GBS) after inactivated influenza vaccine (the flu shot). [facility] children who get the flu shot along with pneumococcal vaccine (PCV13), and/or DTaP vaccine at the same time might be slightly more likely to have a seizure caused by fever. Tell your health care provider if a child who is getting flu vaccine has ever had a seizure. People sometimes faint after medical procedures, including vaccination. Tell your provider if you feel dizzy or have vision changes or ringing in the ears. As with any medicine, there is a very remote chance of a vaccine causing a severe allergic reaction, other serious injury, or death. 5. What if there is a serious problem? An allergic reaction could occur after the vaccinated person leaves the clinic.  If you see signs of a severe allergic reaction (hives, swelling of the face and throat, difficulty breathing, a fast heartbeat, dizziness, or weakness), call 9-1-1 and get the person to the nearest hospital. 
 
For other signs that concern you, call your health care provider. Adverse reactions should be reported to the Vaccine Adverse Event Reporting System (VAERS). Your health care provider will usually file this report, or you can do it yourself. Visit the VAERS website at www.vaers. hhs.gov or call 3-455.656.9749. VAERS is only for reporting reactions, and VAERS staff do not give medical advice. 6. The National Vaccine Injury Compensation Program 
 
The [facility] Vaccine Injury Compensation Program (VICP) is a federal program that was created to compensate people who may have been injured by certain vaccines. Visit the VICP website at www.[facility].gov/vaccinecompensation or call 9-921.251.5853 to learn about the program and about filing a claim. There is a time limit to file a claim for compensation. 7. How can I learn more?  Ask your health care provider.  Call your local or state health department.  Contact the Centers for Disease Control and Prevention (CDC): 
- Call 4-119.722.1308 (1-800-CDC-INFO) or 
- Visit CDCs influenza website at www.cdc.gov/flu Vaccine Information Statement (Interim) Inactivated Influenza Vaccine 8/15/2019 
[address] [id number] Department of [facility] and [place] Centers for Disease Control and Prevention Office Use Only

## 2019-11-26 NOTE — PROGRESS NOTES
Devonne Closs  Identified pt with two pt identifiers(name and ). Chief Complaint   Patient presents with    Heart Problem    Hypertension    Cholesterol Problem    Blood sugar problem       1. Have you been to the ER, urgent care clinic since your last visit? Hospitalized since your last visit? NO    2. Have you seen or consulted any other health care providers outside of the [address] [place] [address] since your last visit? Include any pap smears or colon screening. NO    Today's provider has been notified of reason for visit, vitals and flowsheets obtained on patients.      Patient received paperwork for advance directive during previous visit but has not completed at this time     Reviewed record In preparation for visit, huddled with provider and have obtained necessary documentation      Health Maintenance Due   Topic    Shingrix Vaccine Age 50> (1 of 2)    Influenza Age 5 to Adult        Wt Readings from Last 3 Encounters:   19 189 lb (85.7 kg)   19 183 lb 8 oz (83.2 kg)   19 184 lb (83.5 kg)     Temp Readings from Last 3 Encounters:   19 98 °F (36.7 °C) (Oral)   19 97.6 °F (36.4 °C) (Oral)   19 98.2 °F (36.8 °C) (Oral)     BP Readings from Last 3 Encounters:   19 153/76   19 126/80   19 168/88     Pulse Readings from Last 3 Encounters:   19 60   19 66   19 89     Vitals:    19 0816   BP: 153/76   Pulse: 60   Resp: 16   Temp: 98 °F (36.7 °C)   TempSrc: Oral   SpO2: 98%   Weight: 189 lb (85.7 kg)   Height: 5' 9\" (1.753 m)   PainSc:   6   PainLoc: Foot         Learning Assessment:  :     Learning Assessment 2014   PRIMARY LEARNER Patient   HIGHEST LEVEL OF EDUCATION - PRIMARY LEARNER  DID NOT GRADUATE HIGH SCHOOL   BARRIERS PRIMARY LEARNER READING   CO-LEARNER CAREGIVER No   PRIMARY LANGUAGE ENGLISH   LEARNER PREFERENCE PRIMARY DEMONSTRATION   ANSWERED BY patient   RELATIONSHIP SELF       Depression Screening:  :     3 most recent PHQ Screens 4/17/2019   Little interest or pleasure in doing things Nearly every day   Feeling down, depressed, irritable, or hopeless Nearly every day   Total Score PHQ 2 6   Trouble falling or staying asleep, or sleeping too much More than half the days   Feeling tired or having little energy Nearly every day   Poor appetite, weight loss, or overeating Nearly every day   Feeling bad about yourself - or that you are a failure or have let yourself or your family down Several days   Trouble concentrating on things such as school, work, reading, or watching TV Not at all   Moving or speaking so slowly that other people could have noticed; or the opposite being so fidgety that others notice Several days   Thoughts of being better off dead, or hurting yourself in some way Not at all   PHQ 9 Score 16   How difficult have these problems made it for you to do your work, take care of your home and get along with others -       Fall Risk Assessment:  :     No flowsheet data found. Abuse Screening:  :     No flowsheet data found. ADL Screening:  :     ADL Assessment 10/31/2014   Feeding yourself No Help Needed   Getting from bed to chair No Help Needed   Getting dressed No Help Needed   Bathing or showering No Help Needed   Walk across the room (includes cane/walker) No Help Needed   Using the telphone No Help Needed   Taking your medications No Help Needed   Preparing meals No Help Needed   Managing money (expenses/bills) No Help Needed   Moderately strenuous housework (laundry) No Help Needed   Shopping for personal items (toiletries/medicines) No Help Needed   Shopping for groceries No Help Needed   Driving Help Needed   Climbing a flight of stairs No Help Needed   Getting to places beyond walking distances No Help Needed                 Medication reconciliation up to date and corrected with patient at this time.

## 2020-01-13 ENCOUNTER — OFFICE VISIT (OUTPATIENT)
Dept: INTERNAL MEDICINE CLINIC | Facility: CLINIC | Age: 60
End: 2020-01-13

## 2020-01-13 VITALS
RESPIRATION RATE: 12 BRPM | BODY MASS INDEX: 28.73 KG/M2 | WEIGHT: 194 LBS | OXYGEN SATURATION: 98 % | TEMPERATURE: 97.9 F | HEIGHT: 69 IN | DIASTOLIC BLOOD PRESSURE: 64 MMHG | HEART RATE: 60 BPM | SYSTOLIC BLOOD PRESSURE: 142 MMHG

## 2020-01-13 DIAGNOSIS — I73.9 PERIPHERAL VASCULAR DISEASE WITH CLAUDICATION (HCC): ICD-10-CM

## 2020-01-13 DIAGNOSIS — K21.9 GASTROESOPHAGEAL REFLUX DISEASE WITHOUT ESOPHAGITIS: ICD-10-CM

## 2020-01-13 DIAGNOSIS — R73.02 GLUCOSE INTOLERANCE (IMPAIRED GLUCOSE TOLERANCE): ICD-10-CM

## 2020-01-13 DIAGNOSIS — E78.2 HYPERLIPEMIA, MIXED: ICD-10-CM

## 2020-01-13 DIAGNOSIS — I25.118 CORONARY ARTERY DISEASE OF NATIVE ARTERY OF NATIVE HEART WITH STABLE ANGINA PECTORIS (HCC): Primary | ICD-10-CM

## 2020-01-13 DIAGNOSIS — I10 HYPERTENSION, ESSENTIAL: ICD-10-CM

## 2020-01-13 DIAGNOSIS — I25.5 ISCHEMIC CARDIOMYOPATHY: ICD-10-CM

## 2020-01-13 RX ORDER — AMLODIPINE BESYLATE 5 MG/1
5 TABLET ORAL DAILY
Qty: 90 TAB | Refills: 3 | Status: SHIPPED | OUTPATIENT
Start: 2020-01-13 | End: 2021-08-16 | Stop reason: SDUPTHER

## 2020-01-13 RX ORDER — ROSUVASTATIN CALCIUM 40 MG/1
40 TABLET, COATED ORAL
Qty: 90 TAB | Refills: 3 | Status: SHIPPED | OUTPATIENT
Start: 2020-01-13 | End: 2021-01-31

## 2020-01-13 NOTE — PROGRESS NOTES
Yue Anderson  Identified pt with two pt identifiers(name and ). Chief Complaint   Patient presents with    Hypertension     Room 1A       Reviewed record In preparation for visit and have obtained necessary documentation. Has info on advanced directive but has not filled them out. 1. Have you been to the ER, urgent care clinic or hospitalized since your last visit? No     2. Have you seen or consulted any other health care providers outside of the [address] [place] [address] since your last visit? Include any pap smears or colon screening. No    Vitals reviewed with provider.     Health Maintenance reviewed:     Health Maintenance Due   Topic    Shingrix Vaccine Age 49> (1 of 2)     Abuse Screening Questionnaire 2020    N    N    Y            No Help Needed    No Help Needed       BP Readings from Last 3 Encounters:   20 149/83   19 144/86   19 126/80   Walk across the room (includes cane/walker)         N  Pulse Readings from Last 3 Encounters:   20 60   19 60   19 66   o Help Needed                        Y   N  3 most recent PHQ Screens 2020   PHQ Not Done Active Diagnosis of Depression or Bipolar Disorder   Little interest or pleasure in doing things -   Feeling down, depressed, irritable, or hopeless -   Total Score PHQ 2 -   Trouble falling or staying asleep, or sleeping too much -   Feeling tired or having little energy -   Poor appetite, weight loss, or overeating -   Feeling bad about yourself - or that you are a failure or have let yourself or your family down -   Trouble concentrating on things such as school, work, reading, or watching TV -   Moving or speaking so slowly that other people could have noticed; or the opposite being so fidgety that others notice -   Thoughts of being better off dead, or hurting yourself in some way -   PHQ 9 Score -   How difficult have these problems made it for you to do your work, take care of your home and get along with others -

## 2020-01-13 NOTE — PROGRESS NOTES
HISTORY OF PRESENT ILLNESS  Juli Tapia is a 60 y.o. male. HPI  . He presents for follow up of hypertension, hyperlipidemia, coronary artery disease with myopathy, status post coronary artery stenting, peripheral vascular diease,  and glucose intolerance. Has appointment at cardiologist in February. Diet and Lifestyle: generally follows a low fat low cholesterol diet, generally follows a low sodium diet, follows a diabetic diet regularly, exercises sporadically, smoker 0.5 ppd  Medication compliance: compliant all of the time  Medication side effects: none  Home BP Monitoring: not done  Cardiovascular ROS:  He complains of claudication. Upon walking 50 feet. He denies palpitations, orthopnea, exertional chest pressure/discomfort, lower extremity edema, dyspnea on exertion, dizziness      Stopped taking duloxetine He is seen for followup of depression. Current therapy is with no medication. Without therapy symptoms are resolved  He denies depressed mood, anhedonia, insomnia, feelings of worthlessness/guilt, difficulty concentrating, hopelessness and recurrent thoughts of death. He presents for follow up of gastroesophageal reflux. Current symptoms include heartburn.   He denies dysphagia     Patient Active Problem List   Diagnosis Code    Adenoma of right adrenal gland D35.01    Pulmonary nodules/lesions, multiple R91.8    GERD (gastroesophageal reflux disease) K21.9    Post-traumatic osteoarthritis of left knee M17.32    COPD (chronic obstructive pulmonary disease) ([facility] J44.9    Depression with anxiety F41.8    Hyperlipemia, mixed E78.2    Hypertension, essential I10    Noncompliance with medication regimen Z91.14    Glucose intolerance (impaired glucose tolerance) R73.02    Moderate cigarette smoker (10-19 per day) F17.210    Cardiomyopathy ([address] I42.9    Coronary artery disease of native artery with stable angina pectoris ([facility] [address] I25.118    Peripheral vascular disease with claudication ([facility] I73.9    Overweight (BMI 25.0-29. 9) E66.3     Past Medical History:   Diagnosis Date    Adrenal mass, right ([facility] 2014    Seen on abd CT    CAD (coronary artery disease)     calcific atherosclerosis on CT    Depression     Hypercholesterolemia     Hypertension     Multiple lung nodules      Past Surgical History:   Procedure Laterality Date    HX ORTHOPAEDIC      hand    HX ORTHOPAEDIC      foot     Social History     Socioeconomic History    Marital status: SINGLE     Spouse name: Not on file    Number of children: 0    Years of education: 15    Highest education level: 12th grade   Occupational History    Occupation: retired   Social Needs    Financial resource strain: Not hard at all   [place] insecurity:     Worry: Never true     Inability: Never true    Transportation needs:     Medical: Yes     Non-medical: Yes   Tobacco Use    Smoking status: Current Every Day Smoker     Packs/day: 0.50     Types: Cigarettes    Smokeless tobacco: Never Used   Substance and Sexual Activity    Alcohol use: No     Alcohol/week: 0.0 standard drinks     Comment: stopped jan 2014    Drug use: No   Lifestyle    Physical activity:     Days per week: 0 days     Minutes per session: 0 min    Stress:  Only a little   Relationships    Social connections:     Talks on phone: Never     Gets together: Never     Attends Alevism service: Never     Active member of club or organization: Yes     Attends meetings of clubs or organizations: Never     Relationship status:    Other Topics Concern     Service No    Blood Transfusions No    Caffeine Concern No    Occupational Exposure No    Hobby Hazards No    Sleep Concern No    Stress Concern No    Weight Concern No    Special Diet No    Back Care No    Exercise No    Bike Helmet No    Seat Belt No    Self-Exams No     Family History   Problem Relation Age of Onset    Heart Disease Mother     Asthma Mother     Elevated Lipids Mother    [facility] Hypertension Mother      Allergies   Allergen Reactions    Advil [Ibuprofen] Hives    Duloxetine Diarrhea    Fluoxetine Diarrhea    Lovastatin Diarrhea    Tylenol [Acetaminophen] Hives     Current Outpatient Medications   Medication Sig Dispense Refill    amLODIPine (NORVASC) 2.5 mg tablet Take 1 Tab by mouth daily. 30 Tab 11    nitroglycerin (NITROSTAT) 0.4 mg SL tablet DISSOLVE ONE TABLET UNDER THE TONGUE EVERY 5 MINUTES AS NEEDED FOR CHEST PAIN. DO NOT EXCEED A TOTAL OF 3 DOSES IN 15 MINUTES 1 Bottle 1    lisinopril-hydroCHLOROthiazide (PRINZIDE, ZESTORETIC) 20-12.5 mg per tablet TAKE 2 TABLETS BY MOUTH DAILY 180 Tab 1    busPIRone (BUSPAR) 5 mg tablet TAKE 1 TABLET BY MOUTH THREE TIMES DAILY WITH MEALS 90 Tab 5    isosorbide mononitrate ER (IMDUR) 30 mg tablet Take 1 Tab by mouth daily. 90 Tab 3    metoprolol succinate (TOPROL-XL) 25 mg XL tablet Take 1 Tab by mouth nightly. (Patient taking differently: Take 12.5 mg by mouth nightly.) 90 Tab 3    omeprazole (PRILOSEC) 40 mg capsule Take 1 Cap by mouth daily as needed. 90 Cap 3    ticagrelor (BRILINTA) 90 mg tablet Take 1 Tab by mouth every twelve (12) hours every twelve (12) hours. 60 Tab 11    aspirin delayed-release 81 mg tablet Take 1 Tab by mouth daily. 100 Tab 1    albuterol (PROVENTIL HFA, VENTOLIN HFA, PROAIR HFA) 90 mcg/actuation inhaler Take 1 Puff by inhalation every four (4) hours as needed for Wheezing. 1 Inhaler 2    acetaminophen (TYLENOL EXTRA STRENGTH) 500 mg tablet Take  by mouth every six (6) hours as needed for Pain.  rosuvastatin (CRESTOR) 40 mg tablet Take 1 Tab by mouth nightly. 90 Tab 3       Review of Systems   Constitutional: Negative for malaise/fatigue and weight loss. Gastrointestinal: Negative for constipation and diarrhea. Musculoskeletal: Positive for joint pain (left knee and leg). Negative for back pain. Neurological: Positive for tingling (left foot). Negative for focal weakness.      Visit Vitals  BP 142/64 (BP 1 Location: Left arm, BP Patient Position: Sitting)   Pulse 60   Temp 97.9 °F (36.6 °C) (Oral)   Resp 12   Ht 5' 9\" (1.753 m)   Wt 194 lb (88 kg)   SpO2 98%   BMI 28.65 kg/m²     Physical Exam  Vitals signs and nursing note reviewed. Constitutional:       Appearance: Normal appearance. He is well-developed. HENT:      Head: Normocephalic and atraumatic. Eyes:      Conjunctiva/sclera: Conjunctivae normal.      Pupils: Pupils are equal, round, and reactive to light. Neck:      Musculoskeletal: Neck supple. Thyroid: No thyromegaly. Vascular: No carotid bruit. Cardiovascular:      Rate and Rhythm: Normal rate and regular rhythm. Chest Wall: PMI is not displaced. Pulses:           Dorsalis pedis pulses are 2+ on the right side and 2+ on the left side. Posterior tibial pulses are 2+ on the right side and 2+ on the left side. Heart sounds: Normal heart sounds. No murmur. No gallop. Pulmonary:      Effort: Pulmonary effort is normal.      Breath sounds: No wheezing, rhonchi or rales. Abdominal:      General: Bowel sounds are normal. There is no distension or abdominal bruit. Palpations: Abdomen is soft. There is no hepatomegaly, splenomegaly or mass. Tenderness: There is no tenderness. Musculoskeletal:      Right lower leg: No edema. Left lower leg: No edema. Lymphadenopathy:      Cervical: No cervical adenopathy. Upper Body:      Right upper body: No supraclavicular adenopathy. Left upper body: No supraclavicular adenopathy. Skin:     General: Skin is warm and dry. Findings: No rash. Neurological:      Mental Status: He is alert and oriented to person, place, and time. Sensory: No sensory deficit. Motor: Motor function is intact. Gait: Gait is intact.    Psychiatric:         Attention and Perception: Attention normal.         Mood and Affect: Mood normal.         Speech: Speech normal.         Behavior: Behavior normal.           Lab Results   Component Value Date/Time    Hemoglobin A1c 5.7 (H) 04/18/2017 10:04 AM    Hemoglobin A1c (POC) 5.2 11/26/2019 08:20 AM     Lab Results   Component Value Date/Time    Cholesterol, total 150 05/24/2019 12:31 PM    HDL Cholesterol 55 05/24/2019 12:31 PM    LDL,Direct 181 (H) 10/25/2018 01:40 PM    LDL, calculated 69 05/24/2019 12:31 PM    VLDL, calculated 26 05/24/2019 12:31 PM    Triglyceride 130 05/24/2019 12:31 PM     Lab Results   Component Value Date/Time    Sodium 139 05/24/2019 12:31 PM    Potassium 4.3 05/24/2019 12:31 PM    Chloride 102 05/24/2019 12:31 PM    CO2 24 05/24/2019 12:31 PM    Anion gap 8 12/01/2018 01:16 PM    Glucose 90 05/24/2019 12:31 PM    BUN 20 05/24/2019 12:31 PM    Creatinine 0.76 05/24/2019 12:31 PM    BUN/Creatinine ratio 26 (H) 05/24/2019 12:31 PM    GFR est  05/24/2019 12:31 PM    GFR est non- 05/24/2019 12:31 PM    Calcium 10.0 05/24/2019 12:31 PM    Bilirubin, total 0.2 05/24/2019 12:31 PM    AST (SGOT) 18 05/24/2019 12:31 PM    Alk. phosphatase 105 05/24/2019 12:31 PM    Protein, total 6.7 05/24/2019 12:31 PM    Albumin 4.7 05/24/2019 12:31 PM    Globulin 3.3 12/01/2018 01:16 PM    A-G Ratio 2.4 (H) 05/24/2019 12:31 PM    ALT (SGPT) 33 05/24/2019 12:31 PM         ASSESSMENT and PLAN    ICD-10-CM ICD-9-CM    1. Coronary artery disease of native artery of native heart with stable angina pectoris ([facility] [address] I25.118 414.01      413.9    2. Hyperlipemia, mixed E78.2 272.2 rosuvastatin (CRESTOR) 40 mg tablet      LIPID PANEL      METABOLIC PANEL, COMPREHENSIVE   3. Ischemic cardiomyopathy I25.5 414.8    4. Glucose intolerance (impaired glucose tolerance) R73.02 790.22 HEMOGLOBIN A1C WITH EAG   5. Hypertension, essential I10 401.9 amLODIPine (NORVASC) 5 mg tablet   6. Peripheral vascular disease with claudication (HCC) I73.9 443.9    7.  Gastroesophageal reflux disease without esophagitis K21.9 530.81      Diagnoses and all orders for this visit: 1. Coronary artery disease of native artery of native heart with stable angina pectoris ([address]  Still symptomatic, but stable. Remains on dual antiplatelet agents and needs to restart rosuvastatin    2. Hyperlipemia, mixed  -     Restartrosuvastatin (CRESTOR) 40 mg tablet; Take 1 Tab by mouth nightly. Indications: high cholesterol  -     LIPID PANEL; Future  -     METABOLIC PANEL, COMPREHENSIVE; Future    3. Ischemic cardiomyopathy  Compensated. 4. Glucose intolerance (impaired glucose tolerance)  A1c is stable  -     HEMOGLOBIN A1C WITH EAG; Future    5. Hypertension, essential  Hypertension is controlled. -     amLODIPine (NORVASC) 5 mg tablet; Take 1 Tab by mouth daily. 6. Peripheral vascular disease with claudication ([address]  Stable; likely for intervention soon. 7. Gastroesophageal reflux disease without esophagitis  Symptoms partially controlled. Reminded to take omeprazole with water only at least 30 minutes before eating in the morning. Follow-up and Dispositions    · Return in about 4 months (around 5/13/2020) for HTN, chol, gluc, CAD  Fasting lab one week prior . lab results and schedule of future lab studies reviewed with patient  reviewed diet, exercise and weight control  I have discussed the diagnosis, evaluation and treatment options and the intended plan with the patient. Patient understands and is in agreement. The patient has received an after-visit summary and questions were answered concerning future plans. I have discussed side effects and warnings of any new medications with the patient as well.

## 2020-02-25 ENCOUNTER — OFFICE VISIT (OUTPATIENT)
Dept: CARDIOLOGY CLINIC | Age: 60
End: 2020-02-25

## 2020-02-25 VITALS
HEART RATE: 75 BPM | DIASTOLIC BLOOD PRESSURE: 74 MMHG | OXYGEN SATURATION: 97 % | SYSTOLIC BLOOD PRESSURE: 122 MMHG | BODY MASS INDEX: 28.72 KG/M2 | WEIGHT: 193.9 LBS | RESPIRATION RATE: 18 BRPM | HEIGHT: 69 IN

## 2020-02-25 DIAGNOSIS — I73.9 PERIPHERAL VASCULAR DISEASE WITH CLAUDICATION (HCC): Primary | ICD-10-CM

## 2020-02-25 DIAGNOSIS — I10 HYPERTENSION, ESSENTIAL: ICD-10-CM

## 2020-02-25 DIAGNOSIS — I25.5 ISCHEMIC CARDIOMYOPATHY: ICD-10-CM

## 2020-02-25 DIAGNOSIS — I25.118 CORONARY ARTERY DISEASE OF NATIVE ARTERY OF NATIVE HEART WITH STABLE ANGINA PECTORIS (HCC): ICD-10-CM

## 2020-02-25 DIAGNOSIS — E78.2 HYPERLIPEMIA, MIXED: ICD-10-CM

## 2020-02-25 RX ORDER — NICOTINE 7MG/24HR
1 PATCH, TRANSDERMAL 24 HOURS TRANSDERMAL EVERY 24 HOURS
Qty: 14 PATCH | Refills: 0 | Status: SHIPPED | OUTPATIENT
Start: 2020-04-07 | End: 2020-04-21

## 2020-02-25 RX ORDER — IBUPROFEN 200 MG
1 TABLET ORAL EVERY 24 HOURS
Qty: 42 PATCH | Refills: 0 | Status: SHIPPED | OUTPATIENT
Start: 2020-02-25 | End: 2020-04-07

## 2020-02-25 NOTE — PROGRESS NOTES
Damon Reaves, [facility]    Subjective/HPI:     Nini Winn is a 60 y.o. male is here for routine f/u. He has a PMHx of CAD, ischemic cardiomyopathy, PAD, HTN, HLD and tobacco abuse. He had cardiac catheterization done in 1/2019 with multi-vessel stenting. He had LE angiogram done at the same time which showed bilateral SFA disease > 80%. The patient was lost to follow-up until just recently. He reports complaints of leg pain on the left. He has numbness of his foot and ankle as well on the left side. He complains of cramping and aching sensation. He has no symptoms on the right side. He denies complaints of chest pains, dizziness, orthopnea, or PND. He denies shortness of breath or palpitations. PCP Provider  Robe Torres MD    Past Medical History:   Diagnosis Date    Adrenal mass, right [facility]) 2014    Seen on abd CT    CAD (coronary artery disease)     calcific atherosclerosis on CT    Depression     Hypercholesterolemia     Hypertension     Multiple lung nodules         Allergies   Allergen Reactions    Advil [Ibuprofen] Hives    Duloxetine Diarrhea    Fluoxetine Diarrhea    Lovastatin Diarrhea    Tylenol [Acetaminophen] Other (comments)     PT STATES NOT ALLERGIC TO THIS MEDICATION        Outpatient Encounter Medications as of 2/25/2020   Medication Sig Dispense Refill    isosorbide mononitrate ER (IMDUR) 30 mg tablet TAKE 1 TABLET BY MOUTH ONCE DAILY 30 Tab 5    rosuvastatin (CRESTOR) 40 mg tablet Take 1 Tab by mouth nightly. Indications: high cholesterol 90 Tab 3    amLODIPine (NORVASC) 5 mg tablet Take 1 Tab by mouth daily. 90 Tab 3    nitroglycerin (NITROSTAT) 0.4 mg SL tablet DISSOLVE ONE TABLET UNDER THE TONGUE EVERY 5 MINUTES AS NEEDED FOR CHEST PAIN.   DO NOT EXCEED A TOTAL OF 3 DOSES IN 15 MINUTES 1 Bottle 1    lisinopril-hydroCHLOROthiazide (PRINZIDE, ZESTORETIC) 20-12.5 mg per tablet TAKE 2 TABLETS BY MOUTH DAILY 180 Tab 1    busPIRone (BUSPAR) 5 mg tablet TAKE 1 TABLET BY MOUTH THREE TIMES DAILY WITH MEALS 90 Tab 5    metoprolol succinate (TOPROL-XL) 25 mg XL tablet Take 1 Tab by mouth nightly. 90 Tab 3    omeprazole (PRILOSEC) 40 mg capsule Take 1 Cap by mouth daily as needed. 90 Cap 3    ticagrelor (BRILINTA) 90 mg tablet Take 1 Tab by mouth every twelve (12) hours every twelve (12) hours. 60 Tab 11    aspirin delayed-release 81 mg tablet Take 1 Tab by mouth daily. 100 Tab 1    albuterol (PROVENTIL HFA, VENTOLIN HFA, PROAIR HFA) 90 mcg/actuation inhaler Take 1 Puff by inhalation every four (4) hours as needed for Wheezing. 1 Inhaler 2    acetaminophen (TYLENOL EXTRA STRENGTH) 500 mg tablet Take  by mouth every six (6) hours as needed for Pain. No facility-administered encounter medications on file as of 2/25/2020. Review of Symptoms:    Review of Systems   Constitutional: Negative for chills, fever and weight loss. HENT: Negative for nosebleeds. Eyes: Negative for blurred vision and double vision. Respiratory: Negative for cough, shortness of breath and wheezing. Cardiovascular: Negative for chest pain, palpitations, orthopnea, leg swelling and PND. Gastrointestinal: Negative for abdominal pain, blood in stool, diarrhea, nausea and vomiting. Musculoskeletal: Negative for joint pain. Skin: Negative for rash. Neurological: Negative for dizziness, tingling and loss of consciousness. Endo/Heme/Allergies: Does not bruise/bleed easily. Physical Exam:      General: Well developed, in no acute distress, cooperative and alert  HEENT: No carotid bruits, no JVD, trach is midline. Neck Supple, PEERL, EOM intact. Heart:  reg rate and rhythm; normal S1/S2; no murmurs, no gallops or rubs. Respiratory: Clear bilaterally x 4, no wheezing or rales  Abdomen:   Soft, non-tender, no distention, no masses. + BS. Extremities:  Normal cap refill, no cyanosis, atraumatic. No edema.   Neuro: A&Ox3, speech clear, gait stable. Skin: Skin color is normal. No rashes or lesions. Non diaphoretic  Vascular: Diminished pulses bilaterally    Vitals:    02/25/20 1357   BP: 122/74   Pulse: 75   Resp: 18   SpO2: 97%   Weight: 193 lb 14.4 oz (88 kg)   Height: 5' 9\" (1.753 m)       ECG: sinus rhythm    Cardiology Labs:    Lab Results   Component Value Date/Time    Cholesterol, total 150 05/24/2019 12:31 PM    HDL Cholesterol 55 05/24/2019 12:31 PM    LDL, calculated 69 05/24/2019 12:31 PM    LDL, calculated 227 (H) 04/18/2017 10:04 AM    LDL, calculated 170 (H) 09/13/2016 08:22 AM    VLDL, calculated 26 05/24/2019 12:31 PM       Lab Results   Component Value Date/Time    Hemoglobin A1c 5.7 (H) 04/18/2017 10:04 AM    Hemoglobin A1c (POC) 5.2 11/26/2019 08:20 AM       Lab Results   Component Value Date/Time    Sodium 139 05/24/2019 12:31 PM    Potassium 4.3 05/24/2019 12:31 PM    Chloride 102 05/24/2019 12:31 PM    CO2 24 05/24/2019 12:31 PM    Glucose 90 05/24/2019 12:31 PM    BUN 20 05/24/2019 12:31 PM    Creatinine 0.76 05/24/2019 12:31 PM    BUN/Creatinine ratio 26 (H) 05/24/2019 12:31 PM    GFR est  05/24/2019 12:31 PM    GFR est non- 05/24/2019 12:31 PM    Calcium 10.0 05/24/2019 12:31 PM    Anion gap 8 12/01/2018 01:16 PM    Bilirubin, total 0.2 05/24/2019 12:31 PM    ALT (SGPT) 33 05/24/2019 12:31 PM    AST (SGOT) 18 05/24/2019 12:31 PM    Alk. phosphatase 105 05/24/2019 12:31 PM    Protein, total 6.7 05/24/2019 12:31 PM    Albumin 4.7 05/24/2019 12:31 PM    Globulin 3.3 12/01/2018 01:16 PM    A-G Ratio 2.4 (H) 05/24/2019 12:31 PM          Assessment:       ICD-10-CM ICD-9-CM    1. Peripheral vascular disease with claudication ([facility]) I73.9 443.9 AMB POC EKG ROUTINE W/ 12 LEADS, INTER & REP   2. Coronary artery disease of native artery of native heart with stable angina pectoris ([facility] [address] I25.118 414.01      413.9    3. Ischemic cardiomyopathy I25.5 414.8    4.  Hypertension, essential I10 401.9 AMB POC EKG ROUTINE W/ 12 LEADS, INTER & REP   5. Hyperlipemia, mixed E78.2 272.2 AMB POC EKG ROUTINE W/ 12 LEADS, INTER & REP        Plan:     1. Peripheral vascular disease with claudication (HCC)  [place] class 3, approaching class 4 symptoms with known severe PAD  Will plan for LE angiogram with plans for SFA intervention on the left  CPT 42066, 47149    2. Coronary artery disease of native artery of native heart with stable angina pectoris (HCC)  S/p TIFFANIE to LAD, LCx and RCA in 1/2019  Without anginal or anginal equivalent symptoms  Continue ASA, BB, Imdur, amlodipine, statin, Brilinta    3. Ischemic cardiomyopathy  Echo done 1/2019 with reduced LVEF 25%  S/p multi-vessel staged intervention end of 1/2019  Will repeat echocardiogram  Euvolemic on exam today  Continue lisinopril, metoprolol    4. Hypertension, essential  BP controlled. Continue anti-hypertensive therapy and low sodium diet    5. Hyperlipemia, mixed  LDL 69 in 5/2019  Continue statin therapy and low fat, low cholesterol diet  Lipids managed by PCP    Maryjean Hatchet, NP       Patient seen and examined by me with nurse practitioner in vascular clinic. I personally performed all components of the history, physical, and medical decision making and agree with the assessment and plan as noted. I discussed the risks/benefits/alternatives of the procedure with the patient. The patient understands and agrees to proceed.     Frances Silverman MD

## 2020-02-25 NOTE — PROGRESS NOTES
1. Have you been to the ER, urgent care clinic since your last visit? Hospitalized since your last visit? No.    2. Have you seen or consulted any other health care providers outside of the [address] [place] [address] since your last visit? Include any pap smears or colon screening.    No.        Chief Complaint   Patient presents with    Follow-up     referred by PCP for vascular consult-pain, cramping, numbness- PT DENIES ANY CARDIAC SYMPTOMS

## 2020-02-26 LAB
ALBUMIN SERPL-MCNC: 4.9 G/DL (ref 3.8–4.9)
ALBUMIN/GLOB SERPL: 2.5 {RATIO} (ref 1.2–2.2)
ALP SERPL-CCNC: 95 IU/L (ref 39–117)
ALT SERPL-CCNC: 18 IU/L (ref 0–44)
AST SERPL-CCNC: 17 IU/L (ref 0–40)
BILIRUB SERPL-MCNC: 0.2 MG/DL (ref 0–1.2)
BUN SERPL-MCNC: 12 MG/DL (ref 8–27)
BUN/CREAT SERPL: 15 (ref 10–24)
CALCIUM SERPL-MCNC: 10.4 MG/DL (ref 8.6–10.2)
CHLORIDE SERPL-SCNC: 100 MMOL/L (ref 96–106)
CO2 SERPL-SCNC: 24 MMOL/L (ref 20–29)
CREAT SERPL-MCNC: 0.79 MG/DL (ref 0.76–1.27)
ERYTHROCYTE [DISTWIDTH] IN BLOOD BY AUTOMATED COUNT: 13.4 % (ref 11.6–15.4)
GLOBULIN SER CALC-MCNC: 2 G/DL (ref 1.5–4.5)
GLUCOSE SERPL-MCNC: 82 MG/DL (ref 65–99)
HCT VFR BLD AUTO: 42 % (ref 37.5–51)
HGB BLD-MCNC: 14.4 G/DL (ref 13–17.7)
INR PPP: 1.1 (ref 0.8–1.2)
MCH RBC QN AUTO: 34 PG (ref 26.6–33)
MCHC RBC AUTO-ENTMCNC: 34.3 G/DL (ref 31.5–35.7)
MCV RBC AUTO: 99 FL (ref 79–97)
PLATELET # BLD AUTO: 220 X10E3/UL (ref 150–450)
POTASSIUM SERPL-SCNC: 4.1 MMOL/L (ref 3.5–5.2)
PROT SERPL-MCNC: 6.9 G/DL (ref 6–8.5)
PROTHROMBIN TIME: 11.3 SEC (ref 9.1–12)
RBC # BLD AUTO: 4.23 X10E6/UL (ref 4.14–5.8)
SODIUM SERPL-SCNC: 141 MMOL/L (ref 134–144)
WBC # BLD AUTO: 10.9 X10E3/UL (ref 3.4–10.8)

## 2020-02-28 ENCOUNTER — HOSPITAL ENCOUNTER (OUTPATIENT)
Age: 60
Discharge: HOME | End: 2020-02-28
Attending: INTERNAL MEDICINE | Admitting: INTERNAL MEDICINE
Payer: MEDICAID

## 2020-02-28 VITALS
DIASTOLIC BLOOD PRESSURE: 74 MMHG | OXYGEN SATURATION: 96 % | TEMPERATURE: 97.7 F | BODY MASS INDEX: 28.5 KG/M2 | WEIGHT: 193 LBS | HEART RATE: 72 BPM | SYSTOLIC BLOOD PRESSURE: 144 MMHG | RESPIRATION RATE: 18 BRPM

## 2020-02-28 DIAGNOSIS — I73.9 PVD (PERIPHERAL VASCULAR DISEASE) (HCC): ICD-10-CM

## 2020-02-28 PROCEDURE — 77030028837 HC SYR ANGI PWR INJ COEU -A: Performed by: INTERNAL MEDICINE

## 2020-02-28 PROCEDURE — 36200 PLACE CATHETER IN AORTA: CPT | Performed by: INTERNAL MEDICINE

## 2020-02-28 PROCEDURE — C1894 INTRO/SHEATH, NON-LASER: HCPCS | Performed by: INTERNAL MEDICINE

## 2020-02-28 PROCEDURE — 99153 MOD SED SAME PHYS/QHP EA: CPT | Performed by: INTERNAL MEDICINE

## 2020-02-28 PROCEDURE — C1725 CATH, TRANSLUMIN NON-LASER: HCPCS | Performed by: INTERNAL MEDICINE

## 2020-02-28 PROCEDURE — 75716 ARTERY X-RAYS ARMS/LEGS: CPT | Performed by: INTERNAL MEDICINE

## 2020-02-28 PROCEDURE — 74011250637 HC RX REV CODE- 250/637: Performed by: INTERNAL MEDICINE

## 2020-02-28 PROCEDURE — 77030022017 HC DRSG HEMO QCLOT ZMED -A: Performed by: INTERNAL MEDICINE

## 2020-02-28 PROCEDURE — 74011250636 HC RX REV CODE- 250/636: Performed by: INTERNAL MEDICINE

## 2020-02-28 PROCEDURE — 77030021533 HC CATH ANGI DX PRFMA MRTM -A: Performed by: INTERNAL MEDICINE

## 2020-02-28 PROCEDURE — 99152 MOD SED SAME PHYS/QHP 5/>YRS: CPT | Performed by: INTERNAL MEDICINE

## 2020-02-28 PROCEDURE — C1769 GUIDE WIRE: HCPCS | Performed by: INTERNAL MEDICINE

## 2020-02-28 PROCEDURE — 74011000250 HC RX REV CODE- 250: Performed by: INTERNAL MEDICINE

## 2020-02-28 PROCEDURE — 74011636320 HC RX REV CODE- 636/320: Performed by: INTERNAL MEDICINE

## 2020-02-28 RX ORDER — IODIXANOL 320 MG/ML
INJECTION, SOLUTION INTRAVASCULAR AS NEEDED
Status: DISCONTINUED | OUTPATIENT
Start: 2020-02-28 | End: 2020-02-28 | Stop reason: HOSPADM

## 2020-02-28 RX ORDER — SODIUM CHLORIDE 9 MG/ML
100 INJECTION, SOLUTION INTRAVENOUS CONTINUOUS
Status: DISCONTINUED | OUTPATIENT
Start: 2020-02-28 | End: 2020-02-28 | Stop reason: HOSPADM

## 2020-02-28 RX ORDER — HEPARIN SODIUM 200 [USP'U]/100ML
INJECTION, SOLUTION INTRAVENOUS
Status: COMPLETED | OUTPATIENT
Start: 2020-02-28 | End: 2020-02-28

## 2020-02-28 RX ORDER — LIDOCAINE HYDROCHLORIDE 10 MG/ML
INJECTION, SOLUTION EPIDURAL; INFILTRATION; INTRACAUDAL; PERINEURAL AS NEEDED
Status: DISCONTINUED | OUTPATIENT
Start: 2020-02-28 | End: 2020-02-28 | Stop reason: HOSPADM

## 2020-02-28 RX ORDER — MIDAZOLAM HYDROCHLORIDE 1 MG/ML
INJECTION, SOLUTION INTRAMUSCULAR; INTRAVENOUS AS NEEDED
Status: DISCONTINUED | OUTPATIENT
Start: 2020-02-28 | End: 2020-02-28 | Stop reason: HOSPADM

## 2020-02-28 RX ORDER — FENTANYL CITRATE 50 UG/ML
INJECTION, SOLUTION INTRAMUSCULAR; INTRAVENOUS AS NEEDED
Status: DISCONTINUED | OUTPATIENT
Start: 2020-02-28 | End: 2020-02-28 | Stop reason: HOSPADM

## 2020-02-28 RX ORDER — GUAIFENESIN 100 MG/5ML
81 LIQUID (ML) ORAL DAILY
Status: DISCONTINUED | OUTPATIENT
Start: 2020-02-28 | End: 2020-02-28 | Stop reason: HOSPADM

## 2020-02-28 RX ORDER — SODIUM CHLORIDE 9 MG/ML
50 INJECTION, SOLUTION INTRAVENOUS CONTINUOUS
Status: DISCONTINUED | OUTPATIENT
Start: 2020-02-28 | End: 2020-02-28 | Stop reason: HOSPADM

## 2020-02-28 RX ORDER — FENTANYL CITRATE 50 UG/ML
25 INJECTION, SOLUTION INTRAMUSCULAR; INTRAVENOUS ONCE
Status: COMPLETED | OUTPATIENT
Start: 2020-02-28 | End: 2020-02-28

## 2020-02-28 RX ADMIN — ASPIRIN 81 MG 81 MG: 81 TABLET ORAL at 08:39

## 2020-02-28 RX ADMIN — SODIUM CHLORIDE 50 ML/HR: 900 INJECTION, SOLUTION INTRAVENOUS at 08:39

## 2020-02-28 RX ADMIN — TICAGRELOR 90 MG: 90 TABLET ORAL at 09:27

## 2020-02-28 RX ADMIN — FENTANYL CITRATE 25 MCG: 50 INJECTION, SOLUTION INTRAMUSCULAR; INTRAVENOUS at 13:10

## 2020-02-28 NOTE — PROGRESS NOTES
Problem: Cath Lab Procedures: Pre-Procedure  Goal: Activity/Safety  Outcome: Resolved/Met     Problem: Falls - Risk of  Goal: *Absence of Falls  Description  Document Gina Jaramillo Fall Risk and appropriate interventions in the flowsheet.   Outcome: Resolved/Met  Note: Fall Risk Interventions:            Medication Interventions: Patient to call before getting OOB, Teach patient to arise slowly                   Problem: Patient Education: Go to Patient Education Activity  Goal: Patient/Family Education  Outcome: Resolved/Met

## 2020-02-28 NOTE — Clinical Note
TRANSFER - OUT REPORT:     Verbal report given to: Tomas Blue RN. Report consisted of patient's Situation, Background, Assessment and   Recommendations(SBAR). Opportunity for questions and clarification was provided. Patient transported with a Registered Nurse and [address] [place] [address] / [facility]. Patient transported to: [facility].

## 2020-02-28 NOTE — PROGRESS NOTES
11:54 AM Patient returned to room 2153 via bed from [facility] R groin c/d/i no bleeding and no hematoma. BUE pulses palpable. BLE pulses confirmed via doppler. VSS. Assessment completed. Patient oriented to room and call bell system. Bed wheels locked and bed in lowest position. Call belll within reach. Will continue to monitor. 12:45 PM Dr. Paco Heath notified of patient's c/o incisional pain. Order received for one time dose 50 mcg IV fentanyl. See MAR. Will continue to monitor. 4:00 PM Patient voided without difficulty. Patient ambulated in hallway with RN x1 and tolerated well. Patient specifically denies c/o pain, shortness of breath and/or dizziness. R groin site c/d/i post ambulation. PIV removed, tip intact. Discharge instructions reviewed with patient including but not limited to medications, follow up appointments and post cath site care. Patient has all belongings and instructions on discharge. Patient's R groin site remains c/d/i no bleeding and no hematoma on discharge. 4:20 PM Patient escorted to private vehicle via wheelchair. Patient's friend to drive him home.

## 2020-02-28 NOTE — DISCHARGE INSTRUCTIONS
[address] [place] [address]  [phone]      CARDIOLOGY DISCHARGE INSTRUCTIONS      Patient ID:  Jett Hutton  [id number]  47 y.o.  1960    Admit Date: 2/28/2020    Discharge Date: 2/28/2020     Admitting Physician: Cesar Malave MD     Discharge Physician: Cesar Malave MD    Admission Diagnoses:   PVD (peripheral vascular disease) [facility]) [I73.9]    Discharge Diagnoses: Active Problems:    * No active hospital problems. *      Discharge Condition: Good    Cardiology Procedures this Admission:  LE angio    Disposition: home    Reference discharge instructions provided by nursing for diet and activity. Signed: Cesar Malave MD  2/28/2020  11:45 AM  PERIPHERAL CATHETERIZATION/PCI DISCHARGE INSTRUCTIONS    It is normal to feel tired the first couple days. Take it easy and follow the physicians instructions. CHECK THE CATHETER INSERTION SITE DAILY:    You may shower 24 hours after the procedure, remove the bandage during showering. Wash with soap and water and pat dry. Gentle cleaning of the site with soap and water is sufficient, cover with a dry clean dressing or bandage. Do not apply creams or powders to the area. Do not sit in a bathtub or pool of water for 7 days or until wound has completely healed. Temporary bruising and discomfort is normal and may last a few weeks. You may have a  formation of a small lump at the site which may last up to 6 weeks. CALL THE PHYSICIANS:    If the site becomes red, swollen or feels warm to the touch  If there is bleeding or drainage or if there is unusual pain at the groin or down the leg. If there is any bleeding, lie down, apply pressure or have someone apply pressure with a clean cloth until the bleeding stops. If the bleeding continues, call 911 to be transported to the hospital.  DO NOT DRIVE YOURSELF, [facility] 599.     ACTIVITY:    For the first 24-48 hours or as instructed by the physician:  No lifting, pushing or pulling over 10 pounds and no straining the insertion site. Do not life grocery bags or the garbage can, do not run the vacuum  or  for 7 days. Start with short walks as in the hospital and gradually increase as tolerated each day. It is recommended to walk 30 minutes 5-7 days per week. Follow your physicians instructions on activity. Avoid walking outside in extremes of heat or cold. Walk inside when it is cold and windy or hot and humid. Things to keep in mind:  No driving for at least 24 hours, or as designated by your physician. Limit the number of times you go up and down the stairs  Take rests and pace yourself with activity. Be careful and do not strain with bowel movements. MEDICATIONS:    Take all medications as prescribed  Call your physician if you have any questions  Keep an updated list of your medications with you at all times and give a list to your physician and pharmacist      AFTER CARE:    Follow up with your physician as instructed. Follow a heart healthy diet with proper portion control, daily stress management, daily exercise, blood pressure and cholesterol control , and smoking cessation.

## 2020-02-29 NOTE — PROCEDURES
[facility] CATH    Name:  Edyta Lopez  MR#:  [id number]  :  1960  ACCOUNT #:  [de-identified]  DATE OF SERVICE:  2020      PROCEDURES PERFORMED:  1. Abdominal aortogram.  2.  Bilateral lower extremity angiography. 3.  Third-order catheter placement from right common femoral arterial access site into left superficial femoral artery. 4.  Moderate sedation for half an hour. 5.  Ultrasound-guided arterial access. PREOPERATIVE DIAGNOSES:  PVD. POSTOPERATIVE DIAGNOSES:  PVD. IC:  Isela Mccall MD.    ESTIMATED BLOOD LOSS:  Minimal.    SPECIMENS REMOVED:  None. COMPLICATIONS:  None. ANESTHESIA:  IV conscious sedation and local anesthesia. FINDINGS:  1. Abdominal aorta, no evidence of any aortic stenosis or aortic aneurysm. 2.  Right common iliac artery, no significant stenosis. 3.  Right external iliac artery, no significant stenosis. 4.  Right internal iliac artery, no significant stenosis. 5.  Right common femoral artery, no significant stenosis. 6.  Right profunda femoris artery, no significant stenosis. 7.  Right superficial femoral artery has 100% chronic total occlusion in the mid-segment. 8.  Right popliteal artery, no significant stenosis. 9.  Right infrapopliteal vessels, all three infrapopliteal vessels were angiographically patent. 10.  Left common iliac artery, no significant stenosis. 11.  Left internal iliac artery, no significant stenosis. 12.  Left external iliac artery, no significant stenosis. 13.  Left common femoral artery, no significant stenosis. 14.  Left superficial femoral artery mild disease, no significant stenosis. 15.  Left popliteal artery, no significant stenosis.   12.  Left infrapopliteal vessel, left posterior tibial artery is angiographically patent, supply one vessel distal runoff in left plantar arch, left anterior tibial, tibioperoneal trunk, and peroneal arteries are not very well visualized due to the presence of previously placed orthopedic abril. BRIEF PROCEDURE NOTE:  The right groin was prepped. Right femoral arterial access was obtained using ultrasound guidance. A 5-Vietnamese femoral arterial sheath was placed in the abdominal aorta. Abdominal aortogram was performed. Dedicated angiogram of right lower extremity was performed through right common femoral arterial sheath. Afterwards, a Rim catheter and Versacore wire were used to cross into contralateral left lower extremity. Dedicated angiogram of the left lower extremity was performed. At the end of the procedure, catheters were pulled and the sheath was flushed. Hemostasis was achieved using a manual pressure. CONCLUSION:  1. No evidence of any significant left superficial femoral artery with good one-vessel distal run off into the plantar arch. Left lower extremity symptoms are most likely due to previous orthopedic injury. 2.  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 Will consider future endovascular intervention based upon clinical course and after discussion with the patient.         Walt Brittle, MD [email or web address]  D:  02/28/2020 11:50  T:  02/28/2020 23:09  JOB #:  [id number]

## 2020-08-17 DIAGNOSIS — F41.9 ANXIETY: ICD-10-CM

## 2020-08-19 RX ORDER — BUSPIRONE HYDROCHLORIDE 5 MG/1
TABLET ORAL
Qty: 90 TAB | Refills: 0 | Status: SHIPPED | OUTPATIENT
Start: 2020-08-19 | End: 2020-12-04

## 2020-10-01 DIAGNOSIS — I10 HYPERTENSION, ESSENTIAL: ICD-10-CM

## 2020-10-01 RX ORDER — LISINOPRIL AND HYDROCHLOROTHIAZIDE 12.5; 2 MG/1; MG/1
TABLET ORAL
Qty: 180 TAB | Refills: 0 | Status: SHIPPED | OUTPATIENT
Start: 2020-10-01 | End: 2020-12-04

## 2020-12-04 DIAGNOSIS — I10 HYPERTENSION, ESSENTIAL: ICD-10-CM

## 2020-12-04 DIAGNOSIS — I25.118 CORONARY ARTERY DISEASE OF NATIVE ARTERY OF NATIVE HEART WITH STABLE ANGINA PECTORIS (HCC): ICD-10-CM

## 2020-12-04 DIAGNOSIS — F41.9 ANXIETY: ICD-10-CM

## 2020-12-04 RX ORDER — ISOSORBIDE MONONITRATE 30 MG/1
TABLET, EXTENDED RELEASE ORAL
Qty: 30 TAB | Refills: 0 | Status: SHIPPED | OUTPATIENT
Start: 2020-12-04 | End: 2021-01-02

## 2020-12-04 RX ORDER — BUSPIRONE HYDROCHLORIDE 5 MG/1
TABLET ORAL
Qty: 90 TAB | Refills: 0 | Status: SHIPPED | OUTPATIENT
Start: 2020-12-04 | End: 2021-01-02

## 2020-12-04 RX ORDER — LISINOPRIL AND HYDROCHLOROTHIAZIDE 12.5; 2 MG/1; MG/1
TABLET ORAL
Qty: 60 TAB | Refills: 0 | Status: SHIPPED | OUTPATIENT
Start: 2020-12-04 | End: 2021-01-29

## 2020-12-31 DIAGNOSIS — I25.118 CORONARY ARTERY DISEASE OF NATIVE ARTERY OF NATIVE HEART WITH STABLE ANGINA PECTORIS (HCC): ICD-10-CM

## 2020-12-31 DIAGNOSIS — F41.9 ANXIETY: ICD-10-CM

## 2021-01-02 RX ORDER — BUSPIRONE HYDROCHLORIDE 5 MG/1
TABLET ORAL
Qty: 90 TAB | Refills: 0 | Status: SHIPPED | OUTPATIENT
Start: 2021-01-02 | End: 2021-05-12

## 2021-01-02 RX ORDER — ISOSORBIDE MONONITRATE 30 MG/1
TABLET, EXTENDED RELEASE ORAL
Qty: 30 TAB | Refills: 0 | Status: SHIPPED | OUTPATIENT
Start: 2021-01-02 | End: 2021-05-12

## 2021-01-25 ENCOUNTER — OFFICE VISIT (OUTPATIENT)
Dept: INTERNAL MEDICINE CLINIC | Age: 61
End: 2021-01-25
Payer: MEDICAID

## 2021-01-25 VITALS
OXYGEN SATURATION: 98 % | BODY MASS INDEX: 27.78 KG/M2 | SYSTOLIC BLOOD PRESSURE: 130 MMHG | HEIGHT: 69 IN | RESPIRATION RATE: 16 BRPM | DIASTOLIC BLOOD PRESSURE: 80 MMHG | TEMPERATURE: 97.7 F | WEIGHT: 187.6 LBS | HEART RATE: 67 BPM

## 2021-01-25 DIAGNOSIS — I25.5 ISCHEMIC CARDIOMYOPATHY: ICD-10-CM

## 2021-01-25 DIAGNOSIS — E78.2 HYPERLIPEMIA, MIXED: ICD-10-CM

## 2021-01-25 DIAGNOSIS — R73.02 GLUCOSE INTOLERANCE (IMPAIRED GLUCOSE TOLERANCE): ICD-10-CM

## 2021-01-25 DIAGNOSIS — I73.9 PERIPHERAL VASCULAR DISEASE WITH CLAUDICATION (HCC): ICD-10-CM

## 2021-01-25 DIAGNOSIS — I25.118 CORONARY ARTERY DISEASE OF NATIVE ARTERY OF NATIVE HEART WITH STABLE ANGINA PECTORIS (HCC): ICD-10-CM

## 2021-01-25 DIAGNOSIS — K21.9 GASTROESOPHAGEAL REFLUX DISEASE WITHOUT ESOPHAGITIS: ICD-10-CM

## 2021-01-25 DIAGNOSIS — R91.8 MULTIPLE LUNG NODULES: ICD-10-CM

## 2021-01-25 DIAGNOSIS — J44.9 CHRONIC OBSTRUCTIVE PULMONARY DISEASE, UNSPECIFIED COPD TYPE (HCC): ICD-10-CM

## 2021-01-25 DIAGNOSIS — I10 HYPERTENSION, ESSENTIAL: Primary | ICD-10-CM

## 2021-01-25 PROCEDURE — 99214 OFFICE O/P EST MOD 30 MIN: CPT | Performed by: INTERNAL MEDICINE

## 2021-01-25 RX ORDER — OMEPRAZOLE 40 MG/1
40 CAPSULE, DELAYED RELEASE ORAL
Qty: 90 CAP | Refills: 3 | Status: SHIPPED | OUTPATIENT
Start: 2021-01-25 | End: 2021-08-16 | Stop reason: SDUPTHER

## 2021-01-25 NOTE — PROGRESS NOTES
HPI  Mr. Mireya Rose is a 63y.o. year old male, he is seen today for follow up HTN, establish care. No chest pain or sob, dizziness, weakness, n/v/abd pain, melena or brbpr. Takes tums 2-3 per day for heartburn. Says he does belch a lot. Weight stable. No PND or orthopnea. From note 7/13/2015: Will defer follow up of pulmonary nodules per his request.     Flu shot at [employer] [facility] about 3 mos ago. Gets leg cramps at night as well as with walking - h/o PAD. Needs to follow up with cardiology - will have follow up next week. No f/c or sweats. No cough or wheezing. Hasn't needed albuterol or slntg. Doesn't check bp at home. On disability for about 5 years. Chief Complaint   Patient presents with    Hypertension    Cholesterol Problem        Prior to Admission medications    Medication Sig Start Date End Date Taking? Authorizing Provider   omeprazole (PRILOSEC) 40 mg capsule Take 1 Cap by mouth daily as needed (gerd). Indications: gastroesophageal reflux disease 1/25/21  Yes Dillon Loomis MD   isosorbide mononitrate ER (IMDUR) 30 mg tablet Take 1 tablet by mouth once daily 1/2/21  Yes Dillon Loomis MD   busPIRone (BUSPAR) 5 mg tablet TAKE 1 TABLET BY MOUTH THREE TIMES DAILY WITH MEALS 1/2/21  Yes Dillon Loomis MD   lisinopril-hydroCHLOROthiazide (PRINZIDE, ZESTORETIC) 20-12.5 mg per tablet TAKE 2 TABLETS BY MOUTH DAILY 12/4/20  Yes Dillon Loomis MD   ticagrelor (BRILINTA) 90 mg tablet Take 1 Tab by mouth every twelve (12) hours every twelve (12) hours. Indications: myocardial reinfarction prevention 4/7/20  Yes Carolina Mendenhall MD   rosuvastatin (CRESTOR) 40 mg tablet Take 1 Tab by mouth nightly. Indications: high cholesterol 1/13/20  Yes Erendira Boone MD   amLODIPine (NORVASC) 5 mg tablet Take 1 Tab by mouth daily. 1/13/20  Yes Erendira Boone MD   metoprolol succinate (TOPROL-XL) 25 mg XL tablet Take 1 Tab by mouth nightly.  2/7/19  Yes Adriana Deras Marci Fraire MD   aspirin delayed-release 81 mg tablet Take 1 Tab by mouth daily. 11/15/18  Yes Kali Ramos NP   albuterol (PROVENTIL HFA, VENTOLIN HFA, PROAIR HFA) 90 mcg/actuation inhaler Take 1 Puff by inhalation every four (4) hours as needed for Wheezing. 11/1/18  Yes Karen Garcia MD   acetaminophen (TYLENOL EXTRA STRENGTH) 500 mg tablet Take  by mouth every six (6) hours as needed for Pain. Yes Provider, Historical   nitroglycerin (NITROSTAT) 0.4 mg SL tablet DISSOLVE ONE TABLET UNDER THE TONGUE EVERY 5 MINUTES AS NEEDED FOR CHEST PAIN. DO NOT EXCEED A TOTAL OF 3 DOSES IN 15 MINUTES 11/26/19   Alejandra Trammell MD   omeprazole (PRILOSEC) 40 mg capsule Take 1 Cap by mouth daily as needed. 2/7/19 1/25/21  Karen Garcia MD         Allergies   Allergen Reactions    Advil [Ibuprofen] Hives    Duloxetine Diarrhea    Fluoxetine Diarrhea    Lovastatin Diarrhea    Tylenol [Acetaminophen] Other (comments)     PT STATES NOT ALLERGIC TO THIS MEDICATION         REVIEW OF SYSTEMS:  Per HPI    PHYSICAL EXAM:  Visit Vitals  /80   Pulse 67   Temp 97.7 °F (36.5 °C) (Oral)   Resp 16   Ht 5' 9\" (1.753 m)   Wt 187 lb 9.6 oz (85.1 kg)   SpO2 98%   BMI 27.70 kg/m²     Constitutional: Appears well-developed and well-nourished. No distress. HENT:   Head: Normocephalic and atraumatic. Eyes: No scleral icterus. Mouth: OP clear without lesions, no pharyngeal exudate, multiple missing teeth  Neck: no lad, no tm, supple   Cardiovascular: Normal S1/S2, regular rhythm. No murmurs, rubs, or gallops. Pulmonary/Chest: Effort normal and breath sounds normal. No respiratory distress. No wheezes, rhonchi, or rales. Abdomen: Soft, NT/ND, +BS, no rebound or guarding, no masses, no HSM appreciated. Ext: No edema. Neurological: Alert. Psychiatric: Normal mood and affect.  Behavior is normal.     Lab Results   Component Value Date/Time    Sodium 141 02/25/2020 03:18 PM    Potassium 4.1 02/25/2020 03:18 PM    Chloride 100 02/25/2020 03:18 PM    CO2 24 02/25/2020 03:18 PM    Anion gap 8 12/01/2018 01:16 PM    Glucose 82 02/25/2020 03:18 PM    BUN 12 02/25/2020 03:18 PM    Creatinine 0.79 02/25/2020 03:18 PM    BUN/Creatinine ratio 15 02/25/2020 03:18 PM    GFR est  02/25/2020 03:18 PM    GFR est non-AA 98 02/25/2020 03:18 PM    Calcium 10.4 (H) 02/25/2020 03:18 PM    Bilirubin, total 0.2 02/25/2020 03:18 PM    Alk. phosphatase 95 02/25/2020 03:18 PM    Protein, total 6.9 02/25/2020 03:18 PM    Albumin 4.9 02/25/2020 03:18 PM    Globulin 3.3 12/01/2018 01:16 PM    A-G Ratio 2.5 (H) 02/25/2020 03:18 PM    ALT (SGPT) 18 02/25/2020 03:18 PM     Lab Results   Component Value Date/Time    Hemoglobin A1c 5.7 (H) 04/18/2017 10:04 AM      Lab Results   Component Value Date/Time    Cholesterol, total 150 05/24/2019 12:31 PM    HDL Cholesterol 55 05/24/2019 12:31 PM    LDL,Direct 181 (H) 10/25/2018 01:40 PM    LDL, calculated 69 05/24/2019 12:31 PM    VLDL, calculated 26 05/24/2019 12:31 PM    Triglyceride 130 05/24/2019 12:31 PM          ASSESSMENT/PLAN  Diagnoses and all orders for this visit:    1. Hypertension, essential  Comments:  controlled - continue current medications    2. Hyperlipemia, mixed  -     METABOLIC PANEL, COMPREHENSIVE; Future  -     LIPID PANEL; Future    3. Gastroesophageal reflux disease without esophagitis  Comments:  restart omeprazole - not controlled  Orders:  -     omeprazole (PRILOSEC) 40 mg capsule; Take 1 Cap by mouth daily as needed (gerd). Indications: gastroesophageal reflux disease    4. Peripheral vascular disease with claudication ([facility].)  Comments:  followed by Dr. Emanuel Soriano - has follow up next week    5. Coronary artery disease of native artery of native heart with stable angina pectoris ([facility]    6. Glucose intolerance (impaired glucose tolerance)  -     HEMOGLOBIN A1C WITH EAG; Future    7.  Chronic obstructive pulmonary disease, unspecified COPD type ([facility]  Comments:  stable - encouraged smoking cessation    8. Multiple lung nodules  Comments:  now has insurance and agrees to follow up imaging - last in 8/2015  Orders:  -     CT CHEST WO CONT; Future    9. Ischemic cardiomyopathy  Comments:  EF 25% - has follow up with cardiology next week          Health Maintenance Due   Topic Date Due    COVID-19 Vaccine (1 of 2) 11/30/1975    Shingrix Vaccine Age 50> (1 of 2) 11/30/2009    Lipid Screen  05/24/2020        Follow-up and Dispositions    · Return in about 6 months (around 7/25/2021) for bp, copd. Reviewed plan of care. Patient has provided input and agrees with goals. The nurse provided the patient and/or family with advanced directive information if needed and encouraged the patient to provide a copy to the office when available.

## 2021-01-25 NOTE — PROGRESS NOTES
Vini Garland  Identified pt with two pt identifiers(name and ). Chief Complaint   Patient presents with    Complete Physical       Reviewed record In preparation for visit and have obtained necessary documentation. 1. Have you been to the ER, urgent care clinic or hospitalized since your last visit? No     2. Have you seen or consulted any other health care providers outside of the [address] [place] [address] since your last visit? Include any pap smears or colon screening. No    Patient does not have an advance directive. Vitals reviewed with provider.     Health Maintenance reviewed:     Health Maintenance Due   Topic    COVID-19 Vaccine (1 of 2)    Shingrix Vaccine Age 50> (1 of 2)    Lipid Screen     Flu Vaccine (1)          Wt Readings from Last 3 Encounters:   21 187 lb 9.6 oz (85.1 kg)   20 193 lb (87.5 kg)   20 193 lb 14.4 oz (88 kg)        Temp Readings from Last 3 Encounters:   21 97.7 °F (36.5 °C) (Oral)   20 97.7 °F (36.5 °C)   20 97.9 °F (36.6 °C) (Oral)        BP Readings from Last 3 Encounters:   21 (!) 153/82   20 144/74   20 122/74        Pulse Readings from Last 3 Encounters:   21 67   20 72   20 75        Vitals:    21 1322   BP: (!) 153/82   Pulse: 67   Resp: 16   Temp: 97.7 °F (36.5 °C)   TempSrc: Oral   SpO2: 98%   Weight: 187 lb 9.6 oz (85.1 kg)   Height: 5' 9\" (1.753 m)   PainSc:   5   PainLoc: Leg          Learning Assessment:   :       Learning Assessment 2020   PRIMARY LEARNER Patient Patient   HIGHEST LEVEL OF EDUCATION - PRIMARY LEARNER  - DID NOT GRADUATE HIGH SCHOOL   BARRIERS PRIMARY LEARNER - READING   CO-LEARNER CAREGIVER - No   PRIMARY LANGUAGE ENGLISH ENGLISH   LEARNER PREFERENCE PRIMARY DEMONSTRATION DEMONSTRATION   ANSWERED BY self patient   RELATIONSHIP SELF SELF        Depression Screening:   :       3 most recent PHQ Screens 2021   PHQ Not Done -   Little interest or pleasure in doing things Not at all   Feeling down, depressed, irritable, or hopeless Not at all   Total Score PHQ 2 0   Trouble falling or staying asleep, or sleeping too much -   Feeling tired or having little energy -   Poor appetite, weight loss, or overeating -   Feeling bad about yourself - or that you are a failure or have let yourself or your family down -   Trouble concentrating on things such as school, work, reading, or watching TV -   Moving or speaking so slowly that other people could have noticed; or the opposite being so fidgety that others notice -   Thoughts of being better off dead, or hurting yourself in some way -   PHQ 9 Score -   How difficult have these problems made it for you to do your work, take care of your home and get along with others -        Fall Risk Assessment:   :       Fall Risk Assessment, last 12 mths 1/25/2021   Able to walk? Yes   Fall in past 12 months? 0        Abuse Screening:   :       Abuse Screening Questionnaire 1/13/2020   Do you ever feel afraid of your partner? N   Are you in a relationship with someone who physically or mentally threatens you? N   Is it safe for you to go home?  Y        ADL Screening:   :       ADL Assessment 10/31/2014   Feeding yourself No Help Needed   Getting from bed to chair No Help Needed   Getting dressed No Help Needed   Bathing or showering No Help Needed   Walk across the room (includes cane/walker) No Help Needed   Using the telphone No Help Needed   Taking your medications No Help Needed   Preparing meals No Help Needed   Managing money (expenses/bills) No Help Needed   Moderately strenuous housework (laundry) No Help Needed   Shopping for personal items (toiletries/medicines) No Help Needed   Shopping for groceries No Help Needed   Driving Help Needed   Climbing a flight of stairs No Help Needed   Getting to places beyond walking distances No Help Needed

## 2021-02-08 ENCOUNTER — DOCUMENTATION ONLY (OUTPATIENT)
Dept: CARDIOLOGY CLINIC | Age: 61
End: 2021-02-08

## 2021-02-08 NOTE — PROGRESS NOTES
Received fax from THE [facility] office asking for clearance on EGD/Colonoscopy. Faxed note back to THE [facility] at [phone] stating pt last seen by us 2/25/20 and had le angio 2/28/20 and never f/u. Pt will need to call and schedule a cardiac appt for clearance. Received fax confirmation.

## 2021-02-09 ENCOUNTER — HOSPITAL ENCOUNTER (OUTPATIENT)
Dept: CT IMAGING | Age: 61
Discharge: HOME | End: 2021-02-09
Attending: INTERNAL MEDICINE
Payer: MEDICAID

## 2021-02-09 DIAGNOSIS — R91.8 MULTIPLE LUNG NODULES: ICD-10-CM

## 2021-02-09 PROCEDURE — 71250 CT THORAX DX C-: CPT

## 2021-05-12 DIAGNOSIS — F41.9 ANXIETY: ICD-10-CM

## 2021-05-12 DIAGNOSIS — I25.118 CORONARY ARTERY DISEASE OF NATIVE ARTERY OF NATIVE HEART WITH STABLE ANGINA PECTORIS (HCC): ICD-10-CM

## 2021-05-12 RX ORDER — BUSPIRONE HYDROCHLORIDE 5 MG/1
TABLET ORAL
Qty: 90 TAB | Refills: 0 | Status: SHIPPED | OUTPATIENT
Start: 2021-05-12 | End: 2021-08-16 | Stop reason: SDUPTHER

## 2021-05-12 RX ORDER — ISOSORBIDE MONONITRATE 30 MG/1
TABLET, EXTENDED RELEASE ORAL
Qty: 30 TAB | Refills: 0 | Status: SHIPPED | OUTPATIENT
Start: 2021-05-12 | End: 2021-08-16 | Stop reason: SDUPTHER

## 2021-05-20 NOTE — PROGRESS NOTES
Cardiac Cath Lab Recovery Arrival Note: Jess Mueller arrived to Cardiac Cath Lab, Recovery Area. Staff introduced to patient. Patient identifiers verified with NAME and DATE OF BIRTH. Procedure verified with patient. Consent forms reviewed and signed by patient or authorized representative and verified. Allergies verified. Patient and family oriented to department. Patient and family informed of procedure and plan of care. Questions answered with review. Patient prepped for procedure, per orders from physician, prior to arrival. 
 
Patient on cardiac monitor, non-invasive blood pressure, SPO2 monitor. On room air. Patient is A&Ox 3. Patient reports no c/o. Patient in stretcher, in low position, with side rails up, call bell within reach, patient instructed to call if assistance as needed. Patient prep in: [address], [place] [address]. Patient family has pager # none Family in: [facility] waiting area.   
Prep by: Keanu Sheridan RN 
 Arm band on/IV intact

## 2021-08-16 ENCOUNTER — VIRTUAL VISIT (OUTPATIENT)
Dept: INTERNAL MEDICINE CLINIC | Age: 61
End: 2021-08-16
Payer: MEDICAID

## 2021-08-16 DIAGNOSIS — R19.09 LEFT GROIN MASS: Primary | ICD-10-CM

## 2021-08-16 PROCEDURE — 99441 PR PHYS/QHP TELEPHONE EVALUATION 5-10 MIN: CPT | Performed by: NURSE PRACTITIONER

## 2021-08-16 RX ORDER — ISOSORBIDE MONONITRATE 30 MG/1
30 TABLET, EXTENDED RELEASE ORAL DAILY
Qty: 30 TABLET | Refills: 0 | Status: SHIPPED | OUTPATIENT
Start: 2021-08-16 | End: 2021-08-18 | Stop reason: SDUPTHER

## 2021-08-16 RX ORDER — NITROGLYCERIN 0.4 MG/1
0.4 TABLET SUBLINGUAL
Qty: 1 BOTTLE | Refills: 1 | OUTPATIENT
Start: 2021-08-16

## 2021-08-16 RX ORDER — ROSUVASTATIN CALCIUM 40 MG/1
40 TABLET, COATED ORAL
Qty: 30 TABLET | Refills: 0 | Status: SHIPPED | OUTPATIENT
Start: 2021-08-16 | End: 2021-08-18 | Stop reason: SDUPTHER

## 2021-08-16 RX ORDER — ALBUTEROL SULFATE 90 UG/1
1 AEROSOL, METERED RESPIRATORY (INHALATION)
Qty: 1 INHALER | Refills: 0 | Status: SHIPPED | OUTPATIENT
Start: 2021-08-16 | End: 2022-04-21

## 2021-08-16 RX ORDER — LISINOPRIL AND HYDROCHLOROTHIAZIDE 12.5; 2 MG/1; MG/1
2 TABLET ORAL DAILY
Qty: 60 TABLET | Refills: 11 | Status: SHIPPED | OUTPATIENT
Start: 2021-08-16 | End: 2021-08-18 | Stop reason: SDUPTHER

## 2021-08-16 RX ORDER — AMLODIPINE BESYLATE 5 MG/1
5 TABLET ORAL DAILY
Qty: 90 TABLET | Refills: 0 | Status: SHIPPED | OUTPATIENT
Start: 2021-08-16 | End: 2021-08-18 | Stop reason: SDUPTHER

## 2021-08-16 RX ORDER — OMEPRAZOLE 40 MG/1
40 CAPSULE, DELAYED RELEASE ORAL
Qty: 90 CAPSULE | Refills: 0 | Status: SHIPPED | OUTPATIENT
Start: 2021-08-16 | End: 2021-08-18 | Stop reason: SDUPTHER

## 2021-08-16 RX ORDER — ASPIRIN 81 MG/1
81 TABLET ORAL DAILY
Qty: 100 TABLET | Refills: 1 | Status: CANCELLED | OUTPATIENT
Start: 2021-08-16

## 2021-08-16 RX ORDER — ASPIRIN 81 MG/1
81 TABLET ORAL DAILY
Qty: 100 TABLET | Refills: 1 | OUTPATIENT
Start: 2021-08-16

## 2021-08-16 RX ORDER — METOPROLOL SUCCINATE 25 MG/1
25 TABLET, EXTENDED RELEASE ORAL
Qty: 90 TABLET | Refills: 0 | Status: SHIPPED | OUTPATIENT
Start: 2021-08-16 | End: 2021-08-18 | Stop reason: SDUPTHER

## 2021-08-16 RX ORDER — NITROGLYCERIN 0.4 MG/1
0.4 TABLET SUBLINGUAL
Qty: 1 BOTTLE | Refills: 1 | Status: CANCELLED | OUTPATIENT
Start: 2021-08-16

## 2021-08-16 RX ORDER — BUSPIRONE HYDROCHLORIDE 5 MG/1
5 TABLET ORAL
Qty: 90 TABLET | Refills: 0 | Status: SHIPPED | OUTPATIENT
Start: 2021-08-16 | End: 2021-08-18 | Stop reason: SDUPTHER

## 2021-08-16 NOTE — PATIENT INSTRUCTIONS

## 2021-08-16 NOTE — PROGRESS NOTES
Ahsan Mcknight  Identified pt with two pt identifiers(name and ). Chief Complaint   Patient presents with    Cyst     on the groin // x 1 month // painful // comes up like a golf ball and then goes away //        Reviewed record In preparation for visit and have obtained necessary documentation. 1. Have you been to the ER, urgent care clinic or hospitalized since your last visit? No     2. Have you seen or consulted any other health care providers outside of the [address] [place] [address] since your last visit? Include any pap smears or colon screening. No    Patient does not have an advance directive. Vitals reviewed with provider. Health Maintenance reviewed:     Health Maintenance Due   Topic    COVID-19 Vaccine (1)    Shingrix Vaccine Age 49> (1 of 2)    Lipid Screen         Wt Readings from Last 3 Encounters:   21 187 lb 9.6 oz (85.1 kg)   20 193 lb (87.5 kg)   20 193 lb 14.4 oz (88 kg)      Temp Readings from Last 3 Encounters:   21 97.7 °F (36.5 °C) (Oral)   20 97.7 °F (36.5 °C)   20 97.9 °F (36.6 °C) (Oral)      BP Readings from Last 3 Encounters:   21 130/80   20 144/74   20 122/74      Pulse Readings from Last 3 Encounters:   21 67   20 72   20 75      There were no vitals filed for this visit.        Learning Assessment:   :     Learning Assessment 2020   PRIMARY LEARNER Patient Patient   HIGHEST LEVEL OF EDUCATION - PRIMARY LEARNER  - DID NOT GRADUATE HIGH SCHOOL   BARRIERS PRIMARY LEARNER - READING   CO-LEARNER CAREGIVER - No   PRIMARY LANGUAGE ENGLISH ENGLISH   LEARNER PREFERENCE PRIMARY DEMONSTRATION DEMONSTRATION   ANSWERED BY self patient   RELATIONSHIP SELF SELF        Depression Screening:   :     3 most recent PHQ Screens 2021   PHQ Not Done -   Little interest or pleasure in doing things Not at all   Feeling down, depressed, irritable, or hopeless Not at all   Total Score PHQ 2 0 Trouble falling or staying asleep, or sleeping too much -   Feeling tired or having little energy -   Poor appetite, weight loss, or overeating -   Feeling bad about yourself - or that you are a failure or have let yourself or your family down -   Trouble concentrating on things such as school, work, reading, or watching TV -   Moving or speaking so slowly that other people could have noticed; or the opposite being so fidgety that others notice -   Thoughts of being better off dead, or hurting yourself in some way -   PHQ 9 Score -   How difficult have these problems made it for you to do your work, take care of your home and get along with others -        Fall Risk Assessment:   :     Fall Risk Assessment, last 12 mths 1/25/2021   Able to walk? Yes   Fall in past 12 months? 0        Abuse Screening:   :     Abuse Screening Questionnaire 1/13/2020   Do you ever feel afraid of your partner? N   Are you in a relationship with someone who physically or mentally threatens you? N   Is it safe for you to go home?  Y        ADL Screening:   :     ADL Assessment 10/31/2014   Feeding yourself No Help Needed   Getting from bed to chair No Help Needed   Getting dressed No Help Needed   Bathing or showering No Help Needed   Walk across the room (includes cane/walker) No Help Needed   Using the telphone No Help Needed   Taking your medications No Help Needed   Preparing meals No Help Needed   Managing money (expenses/bills) No Help Needed   Moderately strenuous housework (laundry) No Help Needed   Shopping for personal items (toiletries/medicines) No Help Needed   Shopping for groceries No Help Needed   Driving Help Needed   Climbing a flight of stairs No Help Needed   Getting to places beyond walking distances No Help Needed

## 2021-08-16 NOTE — TELEPHONE ENCOUNTER
PCP: Akira Philip MD     Last appt: 2021   No future appointments. Requested Prescriptions     Pending Prescriptions Disp Refills    aspirin delayed-release 81 mg tablet 100 Tablet 1     Sig: Take 1 Tablet by mouth daily.  ticagrelor (BRILINTA) 90 mg tablet 60 Tablet 11     Sig: Take 1 Tablet by mouth every twelve (12) hours every twelve (12) hours. Indications: myocardial reinfarction prevention    nitroglycerin (NITROSTAT) 0.4 mg SL tablet 1 Bottle 1     Si Tablet by SubLINGual route every five (5) minutes as needed for Chest Pain. Up to 3 doses.

## 2021-08-16 NOTE — PROGRESS NOTES
Darell Hutchison is a 63 y.o. male, evaluated via audio-only technology on 8/16/2021 for Cyst (on the groin // x 1 month // painful // comes up like a golf ball and then goes away // )  . Assessment & Plan:     Diagnoses and all orders for this visit:    1. Left groin mass  -     REFERRAL TO GENERAL SURGERY  Suspect hernia based on description- needs in person eval, he will call Gen Surgy-number given, he will call back if trouble obtaining timely appt    Other orders  -     albuterol (PROVENTIL HFA, VENTOLIN HFA, PROAIR HFA) 90 mcg/actuation inhaler; Take 1 Puff by inhalation every four (4) hours as needed for Wheezing. Indications: chronic obstructive pulmonary disease  -     amLODIPine (NORVASC) 5 mg tablet; Take 1 Tablet by mouth daily. -     busPIRone (BUSPAR) 5 mg tablet; Take 1 Tablet by mouth three (3) times daily (with meals). -     isosorbide mononitrate ER (IMDUR) 30 mg tablet; Take 1 Tablet by mouth daily. -     lisinopril-hydroCHLOROthiazide (PRINZIDE, ZESTORETIC) 20-12.5 mg per tablet; Take 2 Tablets by mouth daily. -     metoprolol succinate (TOPROL-XL) 25 mg XL tablet; Take 1 Tablet by mouth nightly. -     omeprazole (PRILOSEC) 40 mg capsule; Take 1 Capsule by mouth daily as needed (gerd). Indications: gastroesophageal reflux disease  -     rosuvastatin (CRESTOR) 40 mg tablet; Take 1 Tablet by mouth nightly. CHRONIC MED REFILLS  Advised contact Cards for Brillinta and aspirin  PSR to call to make appt with dr stroud for routine fu/future refills        Follow-up and Dispositions    · Return if symptoms worsen or fail to improve. Routing History          12  Subjective:     Pt c/o knot on left groin x 1 month, thinks cyst, painful, comes and goes, swollen like golf ball. Denies heat present. Hasn't tried anything OTC. Denies hx of the same. This last week woke him from sleep. Denies injury or fall, \"always doing something he shouldn't\". Area is painful when he coughs.    Had stents placed in same area in 2019. Also requesting refill on all of his chronic medications. Has not seen cardiologist recently. Was due for fu with Dr. Luz Maria Coffman last month, no future appt scheduled. Prior to Admission medications    Medication Sig Start Date End Date Taking? Authorizing Provider   albuterol (PROVENTIL HFA, VENTOLIN HFA, PROAIR HFA) 90 mcg/actuation inhaler Take 1 Puff by inhalation every four (4) hours as needed for Wheezing. Indications: chronic obstructive pulmonary disease 8/16/21  Yes Burke Payne NP   amLODIPine (NORVASC) 5 mg tablet Take 1 Tablet by mouth daily. 8/16/21  Yes Burke Payne NP   busPIRone (BUSPAR) 5 mg tablet Take 1 Tablet by mouth three (3) times daily (with meals). 8/16/21  Yes Burke Payne NP   isosorbide mononitrate ER (IMDUR) 30 mg tablet Take 1 Tablet by mouth daily. 8/16/21  Yes Burke Payne NP   lisinopril-hydroCHLOROthiazide (PRINZIDE, ZESTORETIC) 20-12.5 mg per tablet Take 2 Tablets by mouth daily. 8/16/21  Yes Burke Payne NP   metoprolol succinate (TOPROL-XL) 25 mg XL tablet Take 1 Tablet by mouth nightly. 8/16/21  Yes Burke Payne NP   omeprazole (PRILOSEC) 40 mg capsule Take 1 Capsule by mouth daily as needed (gerd). Indications: gastroesophageal reflux disease 8/16/21  Yes Burke Payne NP   rosuvastatin (CRESTOR) 40 mg tablet Take 1 Tablet by mouth nightly. 8/16/21  Yes Burke Payne NP   ticagrelor (BRILINTA) 90 mg tablet Take 1 Tab by mouth every twelve (12) hours every twelve (12) hours. Indications: myocardial reinfarction prevention 4/7/20  Yes Mirlande Uriostegui MD   nitroglycerin (NITROSTAT) 0.4 mg SL tablet DISSOLVE ONE TABLET UNDER THE TONGUE EVERY 5 MINUTES AS NEEDED FOR CHEST PAIN. DO NOT EXCEED A TOTAL OF 3 DOSES IN 15 MINUTES 11/26/19  Yes Mirlande Uriostegui MD   aspirin delayed-release 81 mg tablet Take 1 Tab by mouth daily.  11/15/18  Yes Peg Dupont NP   acetaminophen (TYLENOL Dallie Heidi STRENGTH) 500 mg tablet Take  by mouth every six (6) hours as needed for Pain. Yes Provider, Historical   isosorbide mononitrate ER (IMDUR) 30 mg tablet Take 1 tablet by mouth once daily 5/12/21 8/16/21  Anastacio Dixon MD   busPIRone (BUSPAR) 5 mg tablet TAKE 1 TABLET BY MOUTH THREE TIMES DAILY WITH MEALS 5/12/21 8/16/21  Anastacio Dixon MD   rosuvastatin (CRESTOR) 40 mg tablet TAKE 1 TABLET BY MOUTH NIGHTLY FOR HIGH CHOLESTEROL 1/31/21 8/16/21  Anastacio Dixon MD   lisinopril-hydroCHLOROthiazide (PRINZIDE, ZESTORETIC) 20-12.5 mg per tablet TAKE 2 TABLETS BY MOUTH DAILY 1/29/21 8/16/21  Anastacio Dixon MD   omeprazole (PRILOSEC) 40 mg capsule Take 1 Cap by mouth daily as needed (gerd). Indications: gastroesophageal reflux disease 1/25/21 8/16/21  Anastacio Dixon MD   amLODIPine (NORVASC) 5 mg tablet Take 1 Tab by mouth daily. 1/13/20 8/16/21  Ryland Liz MD   metoprolol succinate (TOPROL-XL) 25 mg XL tablet Take 1 Tab by mouth nightly. 2/7/19 8/16/21  Ryland Liz MD   albuterol (PROVENTIL HFA, VENTOLIN HFA, PROAIR HFA) 90 mcg/actuation inhaler Take 1 Puff by inhalation every four (4) hours as needed for Wheezing. 11/1/18 8/16/21  Ryland Liz MD         ROS see hpi      Patient-Reported Vitals 8/16/2021   Patient-Reported Weight 185lb        Belen Bosworth, who was evaluated through a patient-initiated, synchronous (real-time) audio only encounter, and/or her healthcare decision maker, is aware that it is a billable service, with coverage as determined by his insurance carrier. He provided verbal consent to proceed: Yes. He has not had a related appointment within my department in the past 7 days or scheduled within the next 24 hours.       Total Time: minutes: 5-10 minutes    Valentine Ch NP

## 2021-08-18 ENCOUNTER — OFFICE VISIT (OUTPATIENT)
Dept: INTERNAL MEDICINE CLINIC | Age: 61
End: 2021-08-18
Payer: MEDICAID

## 2021-08-18 VITALS
DIASTOLIC BLOOD PRESSURE: 74 MMHG | SYSTOLIC BLOOD PRESSURE: 121 MMHG | BODY MASS INDEX: 26.04 KG/M2 | WEIGHT: 175.8 LBS | RESPIRATION RATE: 16 BRPM | HEIGHT: 69 IN | OXYGEN SATURATION: 98 % | HEART RATE: 79 BPM | TEMPERATURE: 98 F

## 2021-08-18 DIAGNOSIS — F41.8 DEPRESSION WITH ANXIETY: ICD-10-CM

## 2021-08-18 DIAGNOSIS — I10 HYPERTENSION, ESSENTIAL: ICD-10-CM

## 2021-08-18 DIAGNOSIS — Z12.5 PROSTATE CANCER SCREENING: ICD-10-CM

## 2021-08-18 DIAGNOSIS — K21.9 GASTROESOPHAGEAL REFLUX DISEASE WITHOUT ESOPHAGITIS: ICD-10-CM

## 2021-08-18 DIAGNOSIS — R73.01 IFG (IMPAIRED FASTING GLUCOSE): ICD-10-CM

## 2021-08-18 DIAGNOSIS — E78.2 HYPERLIPEMIA, MIXED: ICD-10-CM

## 2021-08-18 DIAGNOSIS — K40.90 INGUINAL HERNIA OF LEFT SIDE WITHOUT OBSTRUCTION OR GANGRENE: ICD-10-CM

## 2021-08-18 DIAGNOSIS — J44.9 CHRONIC OBSTRUCTIVE PULMONARY DISEASE, UNSPECIFIED COPD TYPE (HCC): Primary | ICD-10-CM

## 2021-08-18 DIAGNOSIS — I25.118 CORONARY ARTERY DISEASE OF NATIVE ARTERY OF NATIVE HEART WITH STABLE ANGINA PECTORIS (HCC): ICD-10-CM

## 2021-08-18 DIAGNOSIS — I25.5 ISCHEMIC CARDIOMYOPATHY: ICD-10-CM

## 2021-08-18 PROCEDURE — 99214 OFFICE O/P EST MOD 30 MIN: CPT | Performed by: INTERNAL MEDICINE

## 2021-08-18 RX ORDER — ROSUVASTATIN CALCIUM 40 MG/1
40 TABLET, COATED ORAL
Qty: 90 TABLET | Refills: 3 | Status: SHIPPED | OUTPATIENT
Start: 2021-08-18 | End: 2022-04-19

## 2021-08-18 RX ORDER — ISOSORBIDE MONONITRATE 30 MG/1
30 TABLET, EXTENDED RELEASE ORAL DAILY
Qty: 90 TABLET | Refills: 3 | Status: SHIPPED | OUTPATIENT
Start: 2021-08-18 | End: 2022-04-19

## 2021-08-18 RX ORDER — LISINOPRIL AND HYDROCHLOROTHIAZIDE 12.5; 2 MG/1; MG/1
2 TABLET ORAL DAILY
Qty: 180 TABLET | Refills: 3 | Status: SHIPPED | OUTPATIENT
Start: 2021-08-18 | End: 2022-09-29

## 2021-08-18 RX ORDER — METOPROLOL SUCCINATE 25 MG/1
25 TABLET, EXTENDED RELEASE ORAL
Qty: 90 TABLET | Refills: 3 | Status: SHIPPED | OUTPATIENT
Start: 2021-08-18 | End: 2022-04-19

## 2021-08-18 RX ORDER — OMEPRAZOLE 40 MG/1
40 CAPSULE, DELAYED RELEASE ORAL
Qty: 90 CAPSULE | Refills: 3 | Status: SHIPPED | OUTPATIENT
Start: 2021-08-18 | End: 2022-09-29

## 2021-08-18 RX ORDER — AMLODIPINE BESYLATE 5 MG/1
5 TABLET ORAL DAILY
Qty: 90 TABLET | Refills: 3 | Status: SHIPPED | OUTPATIENT
Start: 2021-08-18 | End: 2022-04-19

## 2021-08-18 RX ORDER — BUSPIRONE HYDROCHLORIDE 5 MG/1
5 TABLET ORAL
Qty: 270 TABLET | Refills: 1 | Status: SHIPPED | OUTPATIENT
Start: 2021-08-18 | End: 2022-04-19

## 2021-08-18 NOTE — PROGRESS NOTES
Edmund Sample  Identified pt with two pt identifiers(name and ). Chief Complaint   Patient presents with    Blood Pressure Check     Room 2A //     COPD    Groin Injury     been there a couple of months // a knot that gets as big as a golf ball and then goes away // in the area of where they put stents        Reviewed record In preparation for visit and have obtained necessary documentation. 1. Have you been to the ER, urgent care clinic or hospitalized since your last visit? No     2. Have you seen or consulted any other health care providers outside of the [address] [place] [address] since your last visit? Include any pap smears or colon screening. No    Patient does not have an advance directive. Vitals reviewed with provider.     Health Maintenance reviewed:     Health Maintenance Due   Topic    Shingrix Vaccine Age 49> (1 of 2)    Lipid Screen           Wt Readings from Last 3 Encounters:   21 175 lb 12.8 oz (79.7 kg)   21 187 lb 9.6 oz (85.1 kg)   20 193 lb (87.5 kg)        Temp Readings from Last 3 Encounters:   21 98 °F (36.7 °C) (Oral)   21 97.7 °F (36.5 °C) (Oral)   20 97.7 °F (36.5 °C)        BP Readings from Last 3 Encounters:   21 121/74   21 130/80   20 144/74        Pulse Readings from Last 3 Encounters:   21 79   21 67   20 72        Vitals:    21 0920   BP: 121/74   Pulse: 79   Resp: 16   Temp: 98 °F (36.7 °C)   TempSrc: Oral   SpO2: 98%   Weight: 175 lb 12.8 oz (79.7 kg)   Height: 5' 9\" (1.753 m)   PainSc:   8   PainLoc: Groin          Learning Assessment:   :       Learning Assessment 2020   PRIMARY LEARNER Patient Patient   HIGHEST LEVEL OF EDUCATION - PRIMARY LEARNER  - DID NOT GRADUATE HIGH SCHOOL   BARRIERS PRIMARY LEARNER - READING   CO-LEARNER CAREGIVER - No   PRIMARY LANGUAGE ENGLISH ENGLISH   LEARNER PREFERENCE PRIMARY DEMONSTRATION DEMONSTRATION   ANSWERED BY self patient RELATIONSHIP SELF SELF        Depression Screening:   :       3 most recent PHQ Screens 8/18/2021   PHQ Not Done -   Little interest or pleasure in doing things Not at all   Feeling down, depressed, irritable, or hopeless Not at all   Total Score PHQ 2 0   Trouble falling or staying asleep, or sleeping too much -   Feeling tired or having little energy -   Poor appetite, weight loss, or overeating -   Feeling bad about yourself - or that you are a failure or have let yourself or your family down -   Trouble concentrating on things such as school, work, reading, or watching TV -   Moving or speaking so slowly that other people could have noticed; or the opposite being so fidgety that others notice -   Thoughts of being better off dead, or hurting yourself in some way -   PHQ 9 Score -   How difficult have these problems made it for you to do your work, take care of your home and get along with others -        Fall Risk Assessment:   :       Fall Risk Assessment, last 12 mths 1/25/2021   Able to walk? Yes   Fall in past 12 months? 0        Abuse Screening:   :       Abuse Screening Questionnaire 8/18/2021 1/13/2020   Do you ever feel afraid of your partner? N N   Are you in a relationship with someone who physically or mentally threatens you? N N   Is it safe for you to go home?  Y Y        ADL Screening:   :       ADL Assessment 8/18/2021   Feeding yourself No Help Needed   Getting from bed to chair No Help Needed   Getting dressed No Help Needed   Bathing or showering No Help Needed   Walk across the room (includes cane/walker) No Help Needed   Using the telphone No Help Needed   Taking your medications No Help Needed   Preparing meals No Help Needed   Managing money (expenses/bills) No Help Needed   Moderately strenuous housework (laundry) No Help Needed   Shopping for personal items (toiletries/medicines) No Help Needed   Shopping for groceries No Help Needed   Driving No Help Needed   Climbing a flight of stairs No Help Needed   Getting to places beyond walking distances No Help Needed

## 2021-08-18 NOTE — PROGRESS NOTES
HPI  Mr. Leonor Cabral is a 63y.o. year old male, he is seen today for follow up HTN, COPD. No chest pain, sob, HA, dizziness, lightheadedness. No change in cough, still smoking. No PND, orthopnea, edema, n/v/abd pain.   gerd symptoms controlled with omeprazole - no more heartburn or belching as he was last visit. Says he is still taking brilinta - needs to follow up with cardiology and will refer again today. Complains of left groin swelling off and on, reducible, for a few months. Scheduled to see Dr. Sheri Doshi for possible hernia. Chief Complaint   Patient presents with    Blood Pressure Check     Room 2A //     COPD    Groin Injury     been there a couple of months // a knot that gets as big as a golf ball and then goes away // in the area of where they put stents         Prior to Admission medications    Medication Sig Start Date End Date Taking? Authorizing Provider   lisinopril-hydroCHLOROthiazide (PRINZIDE, ZESTORETIC) 20-12.5 mg per tablet Take 2 Tablets by mouth daily. 8/18/21  Yes dAy Elizabeth MD   busPIRone (BUSPAR) 5 mg tablet Take 1 Tablet by mouth three (3) times daily (with meals). 8/18/21  Yes Ady Elizabeth MD   metoprolol succinate (TOPROL-XL) 25 mg XL tablet Take 1 Tablet by mouth nightly. 8/18/21  Yes Ady Elizabeth MD   amLODIPine (NORVASC) 5 mg tablet Take 1 Tablet by mouth daily. 8/18/21  Yes Ady Elizabeth MD   rosuvastatin (CRESTOR) 40 mg tablet Take 1 Tablet by mouth nightly. 8/18/21  Yes Ady Elizabeth MD   isosorbide mononitrate ER (IMDUR) 30 mg tablet Take 1 Tablet by mouth daily. 8/18/21  Yes Ady Elizabeth MD   omeprazole (PRILOSEC) 40 mg capsule Take 1 Capsule by mouth daily as needed (gerd). Indications: gastroesophageal reflux disease 8/18/21  Yes Ady Elizabeth MD   albuterol (PROVENTIL HFA, VENTOLIN HFA, PROAIR HFA) 90 mcg/actuation inhaler Take 1 Puff by inhalation every four (4) hours as needed for Wheezing.  Indications: chronic obstructive pulmonary disease 8/16/21  Yes Burke Payne NP   ticagrelor (BRILINTA) 90 mg tablet Take 1 Tab by mouth every twelve (12) hours every twelve (12) hours. Indications: myocardial reinfarction prevention 4/7/20  Yes Mirlande Uriostegui MD   nitroglycerin (NITROSTAT) 0.4 mg SL tablet DISSOLVE ONE TABLET UNDER THE TONGUE EVERY 5 MINUTES AS NEEDED FOR CHEST PAIN. DO NOT EXCEED A TOTAL OF 3 DOSES IN 15 MINUTES 11/26/19  Yes Mirlande Uriostegui MD   aspirin delayed-release 81 mg tablet Take 1 Tab by mouth daily. 11/15/18  Yes Peg Dupont NP   acetaminophen (TYLENOL EXTRA STRENGTH) 500 mg tablet Take  by mouth every six (6) hours as needed for Pain. Yes Provider, Historical         Allergies   Allergen Reactions    Advil [Ibuprofen] Hives    Duloxetine Diarrhea    Fluoxetine Diarrhea    Lovastatin Diarrhea    Tylenol [Acetaminophen] Other (comments)     PT STATES NOT ALLERGIC TO THIS MEDICATION         REVIEW OF SYSTEMS:  Per HPI    PHYSICAL EXAM:  Visit Vitals  /74 (BP 1 Location: Left upper arm, BP Patient Position: Sitting, BP Cuff Size: Adult)   Pulse 79   Temp 98 °F (36.7 °C) (Oral)   Resp 16   Ht 5' 9\" (1.753 m)   Wt 175 lb 12.8 oz (79.7 kg)   SpO2 98%   BMI 25.96 kg/m²     Constitutional: Appears well-developed and well-nourished. No distress. HENT:   Head: Normocephalic and atraumatic. Eyes: No scleral icterus. Neck: no lad, no tm, supple  Cardiovascular: Normal S1/S2, regular rhythm. No murmurs, rubs, or gallops. Pulmonary/Chest: Effort normal and breath sounds normal. No respiratory distress. No wheezes, rhonchi, or rales. Abdomen: Soft, NT/ND, +BS, no rebound or guarding, +bulge left inguinal region,no HSM appreciated. Ext: No edema. Neurological: Alert. Psychiatric: Normal mood and affect.  Behavior is normal.     Lab Results   Component Value Date/Time    Sodium 141 02/25/2020 03:18 PM    Potassium 4.1 02/25/2020 03:18 PM    Chloride 100 02/25/2020 03:18 PM    CO2 24 02/25/2020 03:18 PM    Anion gap 8 12/01/2018 01:16 PM    Glucose 82 02/25/2020 03:18 PM    BUN 12 02/25/2020 03:18 PM    Creatinine 0.79 02/25/2020 03:18 PM    BUN/Creatinine ratio 15 02/25/2020 03:18 PM    GFR est  02/25/2020 03:18 PM    GFR est non-AA 98 02/25/2020 03:18 PM    Calcium 10.4 (H) 02/25/2020 03:18 PM    Bilirubin, total 0.2 02/25/2020 03:18 PM    Alk. phosphatase 95 02/25/2020 03:18 PM    Protein, total 6.9 02/25/2020 03:18 PM    Albumin 4.9 02/25/2020 03:18 PM    Globulin 3.3 12/01/2018 01:16 PM    A-G Ratio 2.5 (H) 02/25/2020 03:18 PM    ALT (SGPT) 18 02/25/2020 03:18 PM     Lab Results   Component Value Date/Time    Hemoglobin A1c 5.7 (H) 04/18/2017 10:04 AM      Lab Results   Component Value Date/Time    Cholesterol, total 150 05/24/2019 12:31 PM    HDL Cholesterol 55 05/24/2019 12:31 PM    LDL,Direct 181 (H) 10/25/2018 01:40 PM    LDL, calculated 69 05/24/2019 12:31 PM    VLDL, calculated 26 05/24/2019 12:31 PM    Triglyceride 130 05/24/2019 12:31 PM          ASSESSMENT/PLAN  Diagnoses and all orders for this visit:    1. Chronic obstructive pulmonary disease, unspecified COPD type ([facility]    2. Hypertension, essential  -     metoprolol succinate (TOPROL-XL) 25 mg XL tablet; Take 1 Tablet by mouth nightly. -     amLODIPine (NORVASC) 5 mg tablet; Take 1 Tablet by mouth daily. 3. Hyperlipemia, mixed  -     METABOLIC PANEL, COMPREHENSIVE; Future  -     LIPID PANEL; Future  -     lisinopril-hydroCHLOROthiazide (PRINZIDE, ZESTORETIC) 20-12.5 mg per tablet; Take 2 Tablets by mouth daily. -     rosuvastatin (CRESTOR) 40 mg tablet; Take 1 Tablet by mouth nightly. 4. Gastroesophageal reflux disease without esophagitis  -     omeprazole (PRILOSEC) 40 mg capsule; Take 1 Capsule by mouth daily as needed (gerd). Indications: gastroesophageal reflux disease    5. Ischemic cardiomyopathy  -     REFERRAL TO CARDIOLOGY    6.  Coronary artery disease of native artery of native heart with stable angina pectoris (HCC)  -     REFERRAL TO CARDIOLOGY  -     CBC WITH AUTOMATED DIFF; Future  -     isosorbide mononitrate ER (IMDUR) 30 mg tablet; Take 1 Tablet by mouth daily. 7. Inguinal hernia of left side without obstruction or gangrene    8. IFG (impaired fasting glucose)  -     HEMOGLOBIN A1C WITH EAG; Future    9. Prostate cancer screening  -     PSA SCREENING (SCREENING); Future    10. Depression with anxiety  -     busPIRone (BUSPAR) 5 mg tablet; Take 1 Tablet by mouth three (3) times daily (with meals). Blood pressure is at goal, continue current medications. He needs labs and they have been ordered. He agrees to follow-up with cardiology, he is overdue. For hernia he has appointment with Dr. Catalina Huerta already scheduled. Depression and anxiety is controlled on current regimen, continue. Encourage smoking cessation. COPD is stable on current regimen, continue. Health Maintenance Due   Topic Date Due    Shingrix Vaccine Age 49> (1 of 2) Never done    Lipid Screen  05/24/2020        Follow-up and Dispositions    · Return in about 6 months (around 2/18/2022) for bp, chol, copd. Reviewed plan of care. Patient has provided input and agrees with goals. The nurse provided the patient and/or family with advanced directive information if needed and encouraged the patient to provide a copy to the office when available.

## 2021-08-31 ENCOUNTER — OFFICE VISIT (OUTPATIENT)
Dept: SURGERY | Age: 61
End: 2021-08-31
Payer: MEDICAID

## 2021-08-31 VITALS
WEIGHT: 178 LBS | DIASTOLIC BLOOD PRESSURE: 78 MMHG | RESPIRATION RATE: 18 BRPM | SYSTOLIC BLOOD PRESSURE: 131 MMHG | OXYGEN SATURATION: 97 % | HEART RATE: 71 BPM | HEIGHT: 69 IN | TEMPERATURE: 97.5 F | BODY MASS INDEX: 26.36 KG/M2

## 2021-08-31 DIAGNOSIS — K40.90 LEFT INGUINAL HERNIA: Primary | ICD-10-CM

## 2021-08-31 DIAGNOSIS — I25.119 CORONARY ARTERY DISEASE INVOLVING NATIVE HEART WITH ANGINA PECTORIS, UNSPECIFIED VESSEL OR LESION TYPE (HCC): ICD-10-CM

## 2021-08-31 PROCEDURE — 99204 OFFICE O/P NEW MOD 45 MIN: CPT | Performed by: SURGERY

## 2021-08-31 NOTE — PROGRESS NOTES
Identified pt with two pt identifiers(name and ). Reviewed record in preparation for visit and have obtained necessary documentation. Chief Complaint   Patient presents with    New Patient     evaluation for knot/cyst on left groin      Vitals:    21 0845   BP: 131/78   Pulse: 71   Resp: 18   Temp: 97.5 °F (36.4 °C)   TempSrc: Temporal   SpO2: 97%   Weight: 80.7 kg (178 lb)   Height: 5' 9\" (1.753 m)   PainSc:   2   PainLoc: Groin       Health Maintenance Review: Patient reminded of \"due or due soon\" health maintenance. I have asked the patient to contact his/her primary care provider (PCP) for follow-up on his/her health maintenance. Coordination of Care Questionnaire:  :   1) Have you been to an emergency room, urgent care, or hospitalized since your last visit? If yes, where when, and reason for visit? no       Patient is accompanied by self I have received verbal consent from Anne Mcnair to discuss any/all medical information while they are present in the room.

## 2021-08-31 NOTE — PROGRESS NOTES
HISTORY OF PRESENT ILLNESS  Lauralyn Siemens is a 63 y.o. male who comes in for consultation by Akira Philip MD for left groin pain and possible hernia  HPI  He has had intermittent left groin pain and bulge for 2-3 months. It becomes very painful at times and has to lay down to reduce the swelling. He denies associated nausea, vomiting, diarrhea, constipation, melena, hematochezia, dysuria or hematuria. He has not had surgery in the area previously. He does not work. Past Medical History:   Diagnosis Date    Adrenal mass, right ([address] 2014    Seen on abd CT    CAD (coronary artery disease)     calcific atherosclerosis on CT    Depression     Hypercholesterolemia     Hypertension     Multiple lung nodules      Past Surgical History:   Procedure Laterality Date    HX ORTHOPAEDIC      hand    HX ORTHOPAEDIC      foot     Family History   Problem Relation Age of Onset    Heart Disease Mother     Asthma Mother     Elevated Lipids Mother     Hypertension Mother      Social History     Tobacco Use    Smoking status: Current Every Day Smoker     Packs/day: 1.00     Types: Cigarettes    Smokeless tobacco: Never Used   Vaping Use    Vaping Use: Never used   Substance Use Topics    Alcohol use: No     Alcohol/week: 0.0 standard drinks     Comment: stopped jan 2014    Drug use: No     Current Outpatient Medications   Medication Sig    lisinopril-hydroCHLOROthiazide (PRINZIDE, ZESTORETIC) 20-12.5 mg per tablet Take 2 Tablets by mouth daily.  busPIRone (BUSPAR) 5 mg tablet Take 1 Tablet by mouth three (3) times daily (with meals).  metoprolol succinate (TOPROL-XL) 25 mg XL tablet Take 1 Tablet by mouth nightly.  amLODIPine (NORVASC) 5 mg tablet Take 1 Tablet by mouth daily.  rosuvastatin (CRESTOR) 40 mg tablet Take 1 Tablet by mouth nightly.  isosorbide mononitrate ER (IMDUR) 30 mg tablet Take 1 Tablet by mouth daily.     omeprazole (PRILOSEC) 40 mg capsule Take 1 Capsule by mouth daily as needed (gerd). Indications: gastroesophageal reflux disease    albuterol (PROVENTIL HFA, VENTOLIN HFA, PROAIR HFA) 90 mcg/actuation inhaler Take 1 Puff by inhalation every four (4) hours as needed for Wheezing. Indications: chronic obstructive pulmonary disease    ticagrelor (BRILINTA) 90 mg tablet Take 1 Tab by mouth every twelve (12) hours every twelve (12) hours. Indications: myocardial reinfarction prevention    nitroglycerin (NITROSTAT) 0.4 mg SL tablet DISSOLVE ONE TABLET UNDER THE TONGUE EVERY 5 MINUTES AS NEEDED FOR CHEST PAIN. DO NOT EXCEED A TOTAL OF 3 DOSES IN 15 MINUTES    aspirin delayed-release 81 mg tablet Take 1 Tab by mouth daily.  acetaminophen (TYLENOL EXTRA STRENGTH) 500 mg tablet Take  by mouth every six (6) hours as needed for Pain. No current facility-administered medications for this visit. Allergies   Allergen Reactions    Advil [Ibuprofen] Hives    Duloxetine Diarrhea    Fluoxetine Diarrhea    Lovastatin Diarrhea    Tylenol [Acetaminophen] Other (comments)     PT STATES NOT ALLERGIC TO THIS MEDICATION       Review of Systems   Constitutional: Negative for chills, diaphoresis, fever, malaise/fatigue and weight loss. HENT: Negative for congestion, ear pain and sore throat. Eyes: Negative for blurred vision and pain. Respiratory: Negative for cough, hemoptysis, sputum production, shortness of breath, wheezing and stridor. Cardiovascular: Negative for chest pain, palpitations, orthopnea, claudication, leg swelling and PND. Gastrointestinal: Positive for abdominal pain. Negative for blood in stool, constipation, diarrhea, heartburn, melena, nausea and vomiting. Genitourinary: Negative for dysuria, flank pain, frequency, hematuria and urgency. Musculoskeletal: Negative for back pain, joint pain, myalgias and neck pain. Skin: Negative for itching and rash.    Neurological: Negative for dizziness, tremors, focal weakness, seizures, weakness and headaches. Endo/Heme/Allergies: Negative for polydipsia. Psychiatric/Behavioral: Negative for depression and memory loss. The patient is not nervous/anxious. Visit Vitals  /78 (BP 1 Location: Left arm, BP Patient Position: Sitting, BP Cuff Size: Adult)   Pulse 71   Temp 97.5 °F (36.4 °C) (Temporal)   Resp 18   Ht 5' 9\" (1.753 m)   Wt 80.7 kg (178 lb)   SpO2 97%   BMI 26.29 kg/m²       Physical Exam  Constitutional:       General: He is not in acute distress. Appearance: Normal appearance. He is well-developed. He is not diaphoretic. HENT:      Head: Normocephalic and atraumatic. Mouth/Throat:      Pharynx: No oropharyngeal exudate. Eyes:      General: No scleral icterus. Conjunctiva/sclera: Conjunctivae normal.      Pupils: Pupils are equal, round, and reactive to light. Neck:      Thyroid: No thyromegaly. Trachea: No tracheal deviation. Cardiovascular:      Rate and Rhythm: Normal rate and regular rhythm. Heart sounds: Normal heart sounds. No murmur heard. No friction rub. No gallop. Pulmonary:      Effort: Pulmonary effort is normal. No respiratory distress. Breath sounds: Normal breath sounds. No stridor. No wheezing or rales. Abdominal:      General: Bowel sounds are normal. There is no distension. Palpations: Abdomen is soft. There is no mass. Tenderness: There is no abdominal tenderness. There is no guarding or rebound. Hernia: A hernia is present. Hernia is present in the left inguinal area (reducible small/medium indirect). There is no hernia in the umbilical area, ventral area or right inguinal area. Genitourinary:     Penis: Circumcised. Testes: Normal. Cremasteric reflex is present. Musculoskeletal:         General: No tenderness. Normal range of motion. Cervical back: Normal range of motion and neck supple. Lymphadenopathy:      Cervical: No cervical adenopathy. Skin:     General: Skin is warm and dry. Findings: No erythema or rash. Neurological:      Mental Status: He is alert and oriented to person, place, and time. Cranial Nerves: No cranial nerve deficit. Coordination: Coordination normal.   Psychiatric:         Behavior: Behavior normal.         Thought Content: Thought content normal.         Judgment: Judgment normal.         ASSESSMENT and PLAN  1. Left inguinal hernia. I explained about the anatomy and pathophysiology of hernias and the risk of incarceration and strangulation of the bowel. I explained about hernia repairs (open with and without mesh, and robotic assisted and laparoscopic with mesh). I explained the risks and benefits of repair including bleeding, infection, chronic pain, orchalgia, loss of testes, bowel or bladder injury, hernia recurrence, seroma, mesh infection requiring removal.  I explained it would be a six to eight week recuperation with no driving for 5 - 7 days, no lifting for six weeks. 2.  CAD s/p PCI   Needs cardiac assessment prior to surgery  Will need to hold Brilinta perioperatively    3. Tobacco abuse  Recommended cessation    4. Not vaccinated for Covid 19  Strongly encouraged vaccination    5. Essential hypertension. Stable on rx  6. Peripheral vascular disease. S/p stent by Dr Roro Ramos       He wishes to proceed with a left inguinal hernia repair with mesh under MAC anesthesia as an outpatient     The patient was counseled at length about the risks of itzel Covid-19 in the giselle-operative and post-operative states including the recovery window of their procedure. The patient was made aware that itzel Covid-19 after a surgical procedure may worsen their prognosis for recovering from the virus and lend to a higher morbidity and or mortality risk. The patient was given the options of postponing their procedure. All of the risks, benefits, and alternatives were discussed. The patient  wishes to proceed with the procedure.     He needs cardiac assessment and be able to stop Brilinta x 5 days prior to surgery    Mason Garza MD FACS

## 2021-09-16 ENCOUNTER — OFFICE VISIT (OUTPATIENT)
Dept: CARDIOLOGY CLINIC | Age: 61
End: 2021-09-16
Payer: MEDICAID

## 2021-09-16 VITALS
OXYGEN SATURATION: 99 % | BODY MASS INDEX: 26.2 KG/M2 | HEART RATE: 57 BPM | WEIGHT: 176.9 LBS | SYSTOLIC BLOOD PRESSURE: 144 MMHG | DIASTOLIC BLOOD PRESSURE: 86 MMHG | RESPIRATION RATE: 18 BRPM | HEIGHT: 69 IN

## 2021-09-16 DIAGNOSIS — I10 HYPERTENSION, ESSENTIAL: ICD-10-CM

## 2021-09-16 DIAGNOSIS — I73.9 PERIPHERAL VASCULAR DISEASE WITH CLAUDICATION (HCC): ICD-10-CM

## 2021-09-16 DIAGNOSIS — Z01.810 PRE-OPERATIVE CARDIOVASCULAR EXAMINATION: ICD-10-CM

## 2021-09-16 DIAGNOSIS — I25.118 CORONARY ARTERY DISEASE OF NATIVE ARTERY OF NATIVE HEART WITH STABLE ANGINA PECTORIS (HCC): Primary | ICD-10-CM

## 2021-09-16 DIAGNOSIS — E78.2 HYPERLIPEMIA, MIXED: ICD-10-CM

## 2021-09-16 DIAGNOSIS — I25.5 ISCHEMIC CARDIOMYOPATHY: ICD-10-CM

## 2021-09-16 PROCEDURE — 99214 OFFICE O/P EST MOD 30 MIN: CPT | Performed by: INTERNAL MEDICINE

## 2021-09-16 PROCEDURE — 93000 ELECTROCARDIOGRAM COMPLETE: CPT | Performed by: INTERNAL MEDICINE

## 2021-09-16 NOTE — PROGRESS NOTES
Mirella Mai, [facility]    Subjective/HPI:     Anastacio Gomez is a 63 y.o. male is here for a f/u appt and cardiac clearance for left inguinal hernia repair. He has a PMHx of CAD, ischemic cardiomyopathy, PAD, HTN, HLD and tobacco abuse. He was last seen in our office 2/25/20. He denies complaints of chest pains, SOB/GARCIA, orthopnea, or PND. He denies palpitations, lightheadedness or syncope. He still has pain in his left ankle, \"pops out\" at times. Main concern is his hernia causing pain. PCP Provider  Simon Camara MD    Past Medical History:   Diagnosis Date    Adrenal mass, right [facility]) 2014    Seen on abd CT    CAD (coronary artery disease)     calcific atherosclerosis on CT    Chronic obstructive pulmonary disease [address] [address]     Depression     Hypercholesterolemia     Hypertension     Long term current use of anticoagulant therapy     Multiple lung nodules         Allergies   Allergen Reactions    Advil [Ibuprofen] Hives    Duloxetine Diarrhea    Fluoxetine Diarrhea    Lovastatin Diarrhea    Tylenol [Acetaminophen] Other (comments)     PT STATES NOT ALLERGIC TO THIS MEDICATION        Outpatient Encounter Medications as of 9/16/2021   Medication Sig Dispense Refill    lisinopril-hydroCHLOROthiazide (PRINZIDE, ZESTORETIC) 20-12.5 mg per tablet Take 2 Tablets by mouth daily. 180 Tablet 3    busPIRone (BUSPAR) 5 mg tablet Take 1 Tablet by mouth three (3) times daily (with meals). 270 Tablet 1    metoprolol succinate (TOPROL-XL) 25 mg XL tablet Take 1 Tablet by mouth nightly. 90 Tablet 3    amLODIPine (NORVASC) 5 mg tablet Take 1 Tablet by mouth daily. 90 Tablet 3    rosuvastatin (CRESTOR) 40 mg tablet Take 1 Tablet by mouth nightly. 90 Tablet 3    isosorbide mononitrate ER (IMDUR) 30 mg tablet Take 1 Tablet by mouth daily. 90 Tablet 3    omeprazole (PRILOSEC) 40 mg capsule Take 1 Capsule by mouth daily as needed (gerd).  Indications: gastroesophageal reflux disease 90 Capsule 3    albuterol (PROVENTIL HFA, VENTOLIN HFA, PROAIR HFA) 90 mcg/actuation inhaler Take 1 Puff by inhalation every four (4) hours as needed for Wheezing. Indications: chronic obstructive pulmonary disease 1 Inhaler 0    ticagrelor (BRILINTA) 90 mg tablet Take 1 Tab by mouth every twelve (12) hours every twelve (12) hours. Indications: myocardial reinfarction prevention 60 Tab 11    nitroglycerin (NITROSTAT) 0.4 mg SL tablet DISSOLVE ONE TABLET UNDER THE TONGUE EVERY 5 MINUTES AS NEEDED FOR CHEST PAIN. DO NOT EXCEED A TOTAL OF 3 DOSES IN 15 MINUTES 1 Bottle 1    aspirin delayed-release 81 mg tablet Take 1 Tab by mouth daily. 100 Tab 1    acetaminophen (TYLENOL EXTRA STRENGTH) 500 mg tablet Take  by mouth every six (6) hours as needed for Pain. No facility-administered encounter medications on file as of 9/16/2021. Review of Symptoms:    Review of Systems   Constitutional: Negative for chills, fever and weight loss. HENT: Negative for nosebleeds. Eyes: Negative for blurred vision and double vision. Respiratory: Negative for cough, shortness of breath and wheezing. Cardiovascular: Negative for chest pain, palpitations, orthopnea, leg swelling and PND. Gastrointestinal: Negative for abdominal pain, blood in stool, diarrhea, nausea and vomiting. Musculoskeletal: Positive for joint pain. Left inguinal area pain   Skin: Negative for rash. Neurological: Negative for dizziness, tingling and loss of consciousness. Endo/Heme/Allergies: Does not bruise/bleed easily. Physical Exam:      General: Well developed, in no acute distress, cooperative and alert  HEENT: No carotid bruits, no JVD, trach is midline. Neck Supple, PEERL, EOM intact. Heart:  reg rate and rhythm; normal S1/S2; no murmurs, no gallops or rubs. Respiratory: Clear bilaterally x 4, no wheezing or rales  Abdomen:   Soft, non-tender, no distention, no masses. + BS.    Extremities:  Normal cap refill, no cyanosis, atraumatic. No edema. Neuro: A&Ox3, speech clear, gait stable. Skin: Skin color is normal. No rashes or lesions. Non diaphoretic  Vascular: Diminished pulses bilaterally    Vitals:    09/16/21 1445   BP: (!) 144/86   Pulse: (!) 57   Resp: 18   SpO2: 99%   Weight: 176 lb 14.4 oz (80.2 kg)   Height: 5' 9\" (1.753 m)       ECG: SR HR 72    Cardiology Labs:    Lab Results   Component Value Date/Time    Cholesterol, total 150 05/24/2019 12:31 PM    HDL Cholesterol 55 05/24/2019 12:31 PM    LDL, calculated 69 05/24/2019 12:31 PM    LDL, calculated 227 (H) 04/18/2017 10:04 AM    LDL, calculated 170 (H) 09/13/2016 08:22 AM    VLDL, calculated 26 05/24/2019 12:31 PM       Lab Results   Component Value Date/Time    Hemoglobin A1c 5.7 (H) 04/18/2017 10:04 AM    Hemoglobin A1c (POC) 5.2 11/26/2019 08:20 AM       Lab Results   Component Value Date/Time    Sodium 141 02/25/2020 03:18 PM    Potassium 4.1 02/25/2020 03:18 PM    Chloride 100 02/25/2020 03:18 PM    CO2 24 02/25/2020 03:18 PM    Glucose 82 02/25/2020 03:18 PM    BUN 12 02/25/2020 03:18 PM    Creatinine 0.79 02/25/2020 03:18 PM    BUN/Creatinine ratio 15 02/25/2020 03:18 PM    GFR est  02/25/2020 03:18 PM    GFR est non-AA 98 02/25/2020 03:18 PM    Calcium 10.4 (H) 02/25/2020 03:18 PM    Anion gap 8 12/01/2018 01:16 PM    Bilirubin, total 0.2 02/25/2020 03:18 PM    ALT (SGPT) 18 02/25/2020 03:18 PM    Alk. phosphatase 95 02/25/2020 03:18 PM    Protein, total 6.9 02/25/2020 03:18 PM    Albumin 4.9 02/25/2020 03:18 PM    Globulin 3.3 12/01/2018 01:16 PM    A-G Ratio 2.5 (H) 02/25/2020 03:18 PM          Assessment:       ICD-10-CM ICD-9-CM    1. Coronary artery disease of native artery of native heart with stable angina pectoris ([facility] [address]  I25.118 414.01 AMB POC EKG ROUTINE W/ 12 LEADS, INTER & REP     413.9 ECHO ADULT COMPLETE   2. Ischemic cardiomyopathy  I25.5 414.8 ECHO ADULT COMPLETE   3. Hypertension, essential  I10 401.9 ECHO ADULT COMPLETE   4. Hyperlipemia, mixed  E78.2 272.2 ECHO ADULT COMPLETE   5. Peripheral vascular disease with claudication (HCC)  I73.9 443.9 ECHO ADULT COMPLETE   6. Pre-operative cardiovascular examination  Z01.810 V72.81         Plan:     1. Peripheral vascular disease with claudication (HCC)  [place]amo class 3, approaching class 4 symptoms with known severe PAD  LE angiogram with Chronic total occlusion of right superficial femoral artery. Left extremity without obstructive flow. He has his continued left ankle pain. Denies other symptoms today. Cont med management. 2. Coronary artery disease of native artery of native heart with stable angina pectoris (HCC)  Hx TIFFANIE to LAD, LCx and RCA in 1/2019  Denies anginal or anginal equivalent symptoms. His EKG shows SR without acute ischemic changes  Continue ASA, BB, Imdur, amlodipine, statin. He is greater then 1 yr post stent. Can stop Brilinta    3. Ischemic cardiomyopathy  Echo done 1/2019 with reduced LVEF 25%  S/p multi-vessel staged intervention end of 1/2019  Euvolemic on exam today  Obtain echo before he can be cleared for surgery  Continue lisinopril, metoprolol    4. Hypertension, essential  BP elevated in office today. Generally well controlled. Continue anti-hypertensive therapy and low sodium diet    5. Hyperlipemia, mixed  LDL 69 in 5/2019  Continue statin therapy and low fat, low cholesterol diet  Lipids managed by PCP, he has lab slip with him today with lipid panel on order    Sharon Zimmerman MD       Patient seen and examined by me with nurse practitioner. I personally performed all components of the history, physical, and medical decision making and agree with the assessment and plan as noted. Check Echo. If EF has improved compared to last Echo - pre PCI, then can proceed with hernia surgery. Dc bilinta. Since no LE PAD symptoms, manage right SFA  medically.     Sharon Zimmerman MD

## 2021-09-16 NOTE — PROGRESS NOTES
Chief Complaint   Patient presents with    Pre-op Exam     needs cardiac clearance for left inguinal hernia repair with mesh under MAC anesthesia as an outpatient - Dr Nicole Hidalgo -denies cardiac sx      1. Have you been to the ER, urgent care clinic since your last visit? Hospitalized since your last visit? No     2. Have you seen or consulted any other health care providers outside of the [address] [place] [address] since your last visit? Include any pap smears or colon screening.   No

## 2021-09-21 ENCOUNTER — ANCILLARY PROCEDURE (OUTPATIENT)
Dept: CARDIOLOGY CLINIC | Age: 61
End: 2021-09-21
Payer: MEDICAID

## 2021-09-21 VITALS
WEIGHT: 176 LBS | DIASTOLIC BLOOD PRESSURE: 86 MMHG | SYSTOLIC BLOOD PRESSURE: 144 MMHG | HEIGHT: 69 IN | BODY MASS INDEX: 26.07 KG/M2

## 2021-09-21 DIAGNOSIS — I10 HYPERTENSION, ESSENTIAL: ICD-10-CM

## 2021-09-21 DIAGNOSIS — I25.118 CORONARY ARTERY DISEASE OF NATIVE ARTERY OF NATIVE HEART WITH STABLE ANGINA PECTORIS (HCC): ICD-10-CM

## 2021-09-21 DIAGNOSIS — I73.9 PERIPHERAL VASCULAR DISEASE WITH CLAUDICATION (HCC): ICD-10-CM

## 2021-09-21 DIAGNOSIS — I25.5 ISCHEMIC CARDIOMYOPATHY: ICD-10-CM

## 2021-09-21 DIAGNOSIS — E78.2 HYPERLIPEMIA, MIXED: ICD-10-CM

## 2021-09-21 LAB
ECHO AO ASC DIAM: 3.7 CM
ECHO AO ROOT DIAM: 3.78 CM
ECHO AV AREA PEAK VELOCITY: 3.28 CM2
ECHO AV AREA/BSA PEAK VELOCITY: 1.7 CM2/M2
ECHO AV PEAK GRADIENT: 3.46 MMHG
ECHO AV PEAK VELOCITY: 93.02 CM/S
ECHO LA AREA 4C: 21.44 CM2
ECHO LA MAJOR AXIS: 3.8 CM
ECHO LA MINOR AXIS: 1.94 CM
ECHO LA VOL 2C: 82.26 ML (ref 18–58)
ECHO LA VOL 4C: 67.06 ML (ref 18–58)
ECHO LA VOL BP: 84.49 ML (ref 18–58)
ECHO LA VOL/BSA BIPLANE: 43.11 ML/M2 (ref 16–28)
ECHO LA VOLUME INDEX A2C: 41.97 ML/M2 (ref 16–28)
ECHO LA VOLUME INDEX A4C: 34.21 ML/M2 (ref 16–28)
ECHO LV E' LATERAL VELOCITY: 6.54 CM/S
ECHO LV E' SEPTAL VELOCITY: 5.72 CM/S
ECHO LV GLOBAL LONGITUDINAL STRAIN (GLS): -12 PERCENT
ECHO LV INTERNAL DIMENSION DIASTOLIC: 5.52 CM (ref 4.2–5.9)
ECHO LV INTERNAL DIMENSION SYSTOLIC: 4.86 CM
ECHO LV IVSD: 1.1 CM (ref 0.6–1)
ECHO LV MASS 2D: 247.9 G (ref 88–224)
ECHO LV MASS INDEX 2D: 126.5 G/M2 (ref 49–115)
ECHO LV POSTERIOR WALL DIASTOLIC: 1.13 CM (ref 0.6–1)
ECHO LVOT DIAM: 2.03 CM
ECHO LVOT PEAK GRADIENT: 3.56 MMHG
ECHO LVOT PEAK VELOCITY: 94.34 CM/S
ECHO LVOT SV: 63.6 ML
ECHO LVOT VTI: 19.69 CM
ECHO MV A VELOCITY: 129.81 CM/S
ECHO MV E DECELERATION TIME (DT): 171.29 MS
ECHO MV E VELOCITY: 77.16 CM/S
ECHO MV E/A RATIO: 0.59
ECHO MV E/E' LATERAL: 11.8
ECHO MV E/E' RATIO (AVERAGED): 12.64
ECHO MV E/E' SEPTAL: 13.49
ECHO MV EROA PISA: 0.14 CM2
ECHO MV REGURGITANT RADIUS PISA: 0.53 CM
ECHO MV REGURGITANT VOLUME: 27.91 ML
ECHO MV REGURGITANT VTIA: 202.19 CM
ECHO RA AREA 4C: 16.56 CM2
ECHO RV TAPSE: 2.46 CM (ref 1.5–2)
GLOBAL LONGITUDINAL STRAIN 2 CHAMBER: -13.1 PERCENT
GLOBAL LONGITUDINAL STRAIN 4 CHAMBER: -10.7 PERCENT
GLOBAL LONGITUDINAL STRAIN LONG AXIS: -12.3 PERCENT
LA VOL DISK BP: 76.15 ML (ref 18–58)
LVOT MG: 1.8 MMHG
MR PISA PV: 539.74 CM/S

## 2021-09-21 PROCEDURE — 93306 TTE W/DOPPLER COMPLETE: CPT | Performed by: INTERNAL MEDICINE

## 2021-09-22 ENCOUNTER — TELEPHONE (OUTPATIENT)
Dept: CARDIOLOGY CLINIC | Age: 61
End: 2021-09-22

## 2021-09-22 NOTE — TELEPHONE ENCOUNTER
----- Message from Sharon Zimmerman MD sent at 9/21/2021 11:09 AM EDT -----  Inform him LV function has improved. No further work up, he can proceed with his hernia surgery.

## 2021-09-28 NOTE — TELEPHONE ENCOUNTER
Called pt,verified pt with two pt identifiers, advised pt his echo has improved with his pumping strength. And he is cleared for his surgery. Advised if the surgeon needs something faxed to them have them contact our office and I will fax over the info. Pt verbalized understanding.

## 2021-09-29 ENCOUNTER — TELEPHONE (OUTPATIENT)
Dept: SURGERY | Age: 61
End: 2021-09-29

## 2021-09-29 NOTE — TELEPHONE ENCOUNTER
Cardiac clearance obtained from Dr Jackie Burgess to schedule for a left inguinal hernia repair with mesh under MAC anesthesia as an outpatient    Will need to hold Brilinta x 5 says prior to surgery    Left message for patient to call back    Ricardo Church MD FACS

## 2021-09-29 NOTE — TELEPHONE ENCOUNTER
Spoke with patient to let him know I gave Dr Marcela Eastman the cardiac note, that shows he's clear for surgery. Once I get order I will give order to Linda to set patient up for surgery. Patient notified by phone.

## 2021-09-29 NOTE — TELEPHONE ENCOUNTER
PT STATES THAT HE HAS COMPLETED HIS ECHO AND WOULD LIKE ACLL BACK TO TO KNOW THE NEXT STEPS OF SCHEDULING SURGERY. PT IS IN SEVERE PAIN, HARD TO SLEEP AND IT IS BECOMING HARD WALK.  WOULD LIKE CALL ASAP

## 2021-10-01 NOTE — PERIOP NOTES
[facility]  Ambulatory Surgery Unit  Pre-operative Instructions    Surgery/Procedure Date  10/11            Tentative Arrival Time tbd      1. On the day of your surgery/procedure, please report to the Ambulatory Surgery Unit Registration Desk and sign in at your designated time. The Ambulatory Surgery Unit is located in [facility] on the [facility] side of the [facility] across from the [address] [place] [address]. Please have all of your health insurance cards and a photo ID. 2. You must have someone with you to drive you home, as you should not drive a car for 24 hours following anesthesia. Please make arrangements for a responsible adult friend or family member to stay with you for at least the first 24 hours after your surgery. 3. Do not have anything to eat or drink (including water, gum, mints, coffee, juice) after 11:59 PM  10/10. This may not apply to medications prescribed by your physician. (Please note below the special instructions with medications to take the morning of surgery, if applicable.)    4. We recommend you do not drink any alcoholic beverages for 24 hours before and after your surgery. 5. Contact your surgeons office for instructions on the following medications: non-steroidal anti-inflammatory drugs (i.e. Advil, Aleve), vitamins, and supplements. (Some surgeons will want you to stop these medications prior to surgery and others may allow you to take them)   **If you are currently taking Plavix, Coumadin, Aspirin and/or other blood-thinning agents, contact your surgeon for instructions. ** Your surgeon will partner with the physician prescribing these medications to determine if it is safe to stop or if you need to continue taking. Please do not stop taking these medications without instructions from your surgeon.     6. In an effort to help prevent surgical site infection, we ask that you shower with an anti-bacterial soap (i.e. Dial/Safeguard, or the soap provided to you at your preadmission testing appointment) for 3 days prior to and on the morning of surgery, using a fresh towel after each shower. (Please begin this process with fresh bed linens.) Do not apply any lotions, powders, or deodorants after the shower on the day of your procedure. If applicable, please do not shave the operative site for 48 hours prior to surgery. 7. Wear comfortable clothes. Wear glasses instead of contacts. Do not bring any jewelry or money (other than copays or fees as instructed). Do not wear make-up, particularly mascara, the morning of your surgery. Do not wear nail polish, particularly if you are having foot /hand surgery. Wear your hair loose or down, no ponytails, buns, gali pins or clips. All body piercings must be removed. 8. You should understand that if you do not follow these instructions your surgery may be cancelled. If your physical condition changes (i.e. fever, cold or flu) please contact your surgeon as soon as possible. 9. It is important that you be on time. If a situation occurs where you may be late, or if you have any questions or problems, please call [phone].    10. Your surgery time may be subject to change. You will receive a phone call the day prior to surgery to confirm your arrival time. Special Instructions: Take all medications and inhalers, as prescribed, on the morning of surgery with a sip of water EXCEPT: none      I understand a pre-operative phone call will be made to verify my surgery time. In the event that I am not available, I give permission for a message to be left on my answering service and/or with another person? yes    Reviewed by phone with pt, verbalized understanding.  covid testing 10/7 [phone]     ___________________      ___________________      ________________  (Signature of Patient)          (Witness)                   (Date and Time)

## 2021-10-01 NOTE — PERIOP NOTES
Spoke with [facility] in Dr. Neelam Gomez office. Per 9/16 cardio note, pt may stop brillinta as PCI was >1 year. Pt remains on ASA 81mg daily. She will reach out to cardiology for clearance to hold ASA for surgery and will notify pt.

## 2021-10-05 ENCOUNTER — TELEPHONE (OUTPATIENT)
Dept: CARDIOLOGY CLINIC | Age: 61
End: 2021-10-05

## 2021-10-05 ENCOUNTER — TELEPHONE (OUTPATIENT)
Dept: SURGERY | Age: 61
End: 2021-10-05

## 2021-10-05 NOTE — TELEPHONE ENCOUNTER
Per echo note, pt is cleared for hernia surgery. How many days can he stop his ASA before surgery? Please advise, thanks.

## 2021-10-06 NOTE — TELEPHONE ENCOUNTER
Message  Received: Today  Mirian Jones MD sent to Giovanna Marquez LPN  Caller: Unspecified Gary Gates,  2:46 PM)  5 days. Faxed clearance note to  at [phone] stating per  pt is cleared for his upcoming surgery and can hold his ASA 5 days before surgery and resume per surgeon afterwards. Received fax confirmation.

## 2021-10-07 ENCOUNTER — ANESTHESIA EVENT (OUTPATIENT)
Dept: SURGERY | Age: 61
End: 2021-10-07
Payer: MEDICAID

## 2021-10-07 ENCOUNTER — HOSPITAL ENCOUNTER (OUTPATIENT)
Dept: PREADMISSION TESTING | Age: 61
Discharge: HOME | End: 2021-10-07
Payer: MEDICAID

## 2021-10-07 PROCEDURE — U0005 INFEC AGEN DETEC AMPLI PROBE: HCPCS

## 2021-10-08 LAB
SARS-COV-2, XPLCVT: NOT DETECTED
SOURCE, COVRS: NORMAL

## 2021-10-11 ENCOUNTER — ANESTHESIA (OUTPATIENT)
Dept: SURGERY | Age: 61
End: 2021-10-11
Payer: MEDICAID

## 2021-10-11 ENCOUNTER — HOSPITAL ENCOUNTER (OUTPATIENT)
Age: 61
Setting detail: OUTPATIENT SURGERY
Discharge: HOME | End: 2021-10-11
Attending: SURGERY | Admitting: SURGERY
Payer: MEDICAID

## 2021-10-11 VITALS
TEMPERATURE: 98.2 F | WEIGHT: 177 LBS | HEIGHT: 68 IN | BODY MASS INDEX: 26.83 KG/M2 | OXYGEN SATURATION: 98 % | RESPIRATION RATE: 23 BRPM | DIASTOLIC BLOOD PRESSURE: 78 MMHG | SYSTOLIC BLOOD PRESSURE: 141 MMHG | HEART RATE: 66 BPM

## 2021-10-11 DIAGNOSIS — K40.90 LEFT INGUINAL HERNIA: Primary | ICD-10-CM

## 2021-10-11 PROCEDURE — 76210000034 HC AMBSU PH I REC 0.5 TO 1 HR: Performed by: SURGERY

## 2021-10-11 PROCEDURE — 74011000250 HC RX REV CODE- 250: Performed by: NURSE ANESTHETIST, CERTIFIED REGISTERED

## 2021-10-11 PROCEDURE — 77030021352 HC CBL LD SYS DISP COVD -B: Performed by: SURGERY

## 2021-10-11 PROCEDURE — 74011000250 HC RX REV CODE- 250: Performed by: SURGERY

## 2021-10-11 PROCEDURE — 88302 TISSUE EXAM BY PATHOLOGIST: CPT

## 2021-10-11 PROCEDURE — 77030002996 HC SUT SLK J&J -A: Performed by: SURGERY

## 2021-10-11 PROCEDURE — 76060000062 HC AMB SURG ANES 1 TO 1.5 HR: Performed by: SURGERY

## 2021-10-11 PROCEDURE — 74011250636 HC RX REV CODE- 250/636: Performed by: ANESTHESIOLOGY

## 2021-10-11 PROCEDURE — 2709999900 HC NON-CHARGEABLE SUPPLY: Performed by: SURGERY

## 2021-10-11 PROCEDURE — 76030000001 HC AMB SURG OR TIME 1 TO 1.5: Performed by: SURGERY

## 2021-10-11 PROCEDURE — 77030002986 HC SUT PROL J&J -A: Performed by: SURGERY

## 2021-10-11 PROCEDURE — C1781 MESH (IMPLANTABLE): HCPCS | Performed by: SURGERY

## 2021-10-11 PROCEDURE — 77030040503 HC DRN WND PENRS MDII -A: Performed by: SURGERY

## 2021-10-11 PROCEDURE — 76210000046 HC AMBSU PH II REC FIRST 0.5 HR: Performed by: SURGERY

## 2021-10-11 PROCEDURE — 77030011265 HC ELECTRD BLD HEX COVD -A: Performed by: SURGERY

## 2021-10-11 PROCEDURE — 77030031139 HC SUT VCRL2 J&J -A: Performed by: SURGERY

## 2021-10-11 PROCEDURE — 74011250636 HC RX REV CODE- 250/636: Performed by: SURGERY

## 2021-10-11 PROCEDURE — 74011250636 HC RX REV CODE- 250/636: Performed by: NURSE ANESTHETIST, CERTIFIED REGISTERED

## 2021-10-11 PROCEDURE — 49505 PRP I/HERN INIT REDUC >5 YR: CPT | Performed by: SURGERY

## 2021-10-11 DEVICE — BARD SOFT MESH, 3" X 6" (7.5 CM X 15 CM)
Type: IMPLANTABLE DEVICE | Site: ABDOMEN | Status: FUNCTIONAL
Brand: BARD

## 2021-10-11 RX ORDER — PROPOFOL 10 MG/ML
INJECTION, EMULSION INTRAVENOUS AS NEEDED
Status: DISCONTINUED | OUTPATIENT
Start: 2021-10-11 | End: 2021-10-11 | Stop reason: HOSPADM

## 2021-10-11 RX ORDER — DEXMEDETOMIDINE HYDROCHLORIDE 100 UG/ML
INJECTION, SOLUTION INTRAVENOUS AS NEEDED
Status: DISCONTINUED | OUTPATIENT
Start: 2021-10-11 | End: 2021-10-11 | Stop reason: HOSPADM

## 2021-10-11 RX ORDER — OXYCODONE AND ACETAMINOPHEN 5; 325 MG/1; MG/1
1 TABLET ORAL
Status: DISCONTINUED | OUTPATIENT
Start: 2021-10-11 | End: 2021-10-11 | Stop reason: HOSPADM

## 2021-10-11 RX ORDER — BUPIVACAINE HYDROCHLORIDE 5 MG/ML
INJECTION, SOLUTION EPIDURAL; INTRACAUDAL AS NEEDED
Status: DISCONTINUED | OUTPATIENT
Start: 2021-10-11 | End: 2021-10-11 | Stop reason: HOSPADM

## 2021-10-11 RX ORDER — KETOROLAC TROMETHAMINE 30 MG/ML
INJECTION, SOLUTION INTRAMUSCULAR; INTRAVENOUS AS NEEDED
Status: DISCONTINUED | OUTPATIENT
Start: 2021-10-11 | End: 2021-10-11 | Stop reason: HOSPADM

## 2021-10-11 RX ORDER — OXYCODONE AND ACETAMINOPHEN 5; 325 MG/1; MG/1
1 TABLET ORAL
Qty: 20 TABLET | Refills: 0 | Status: SHIPPED | OUTPATIENT
Start: 2021-10-11 | End: 2021-10-16

## 2021-10-11 RX ORDER — POLYETHYLENE GLYCOL 3350 17 G/17G
17 POWDER, FOR SOLUTION ORAL 2 TIMES DAILY
Qty: 60 PACKET | Refills: 0 | Status: SHIPPED | OUTPATIENT
Start: 2021-10-11 | End: 2021-11-10

## 2021-10-11 RX ORDER — SODIUM CHLORIDE 0.9 % (FLUSH) 0.9 %
5-40 SYRINGE (ML) INJECTION AS NEEDED
Status: DISCONTINUED | OUTPATIENT
Start: 2021-10-11 | End: 2021-10-11 | Stop reason: HOSPADM

## 2021-10-11 RX ORDER — PROPOFOL 10 MG/ML
INJECTION, EMULSION INTRAVENOUS
Status: DISCONTINUED | OUTPATIENT
Start: 2021-10-11 | End: 2021-10-11 | Stop reason: HOSPADM

## 2021-10-11 RX ORDER — DIPHENHYDRAMINE HYDROCHLORIDE 50 MG/ML
12.5 INJECTION, SOLUTION INTRAMUSCULAR; INTRAVENOUS AS NEEDED
Status: DISCONTINUED | OUTPATIENT
Start: 2021-10-11 | End: 2021-10-11 | Stop reason: HOSPADM

## 2021-10-11 RX ORDER — ONDANSETRON 2 MG/ML
INJECTION INTRAMUSCULAR; INTRAVENOUS AS NEEDED
Status: DISCONTINUED | OUTPATIENT
Start: 2021-10-11 | End: 2021-10-11 | Stop reason: HOSPADM

## 2021-10-11 RX ORDER — SODIUM CHLORIDE, SODIUM LACTATE, POTASSIUM CHLORIDE, CALCIUM CHLORIDE 600; 310; 30; 20 MG/100ML; MG/100ML; MG/100ML; MG/100ML
25 INJECTION, SOLUTION INTRAVENOUS CONTINUOUS
Status: DISCONTINUED | OUTPATIENT
Start: 2021-10-11 | End: 2021-10-11 | Stop reason: HOSPADM

## 2021-10-11 RX ORDER — MIDAZOLAM HYDROCHLORIDE 1 MG/ML
INJECTION, SOLUTION INTRAMUSCULAR; INTRAVENOUS AS NEEDED
Status: DISCONTINUED | OUTPATIENT
Start: 2021-10-11 | End: 2021-10-11 | Stop reason: HOSPADM

## 2021-10-11 RX ORDER — HYDROMORPHONE HYDROCHLORIDE 1 MG/ML
.2-.5 INJECTION, SOLUTION INTRAMUSCULAR; INTRAVENOUS; SUBCUTANEOUS ONCE
Status: DISCONTINUED | OUTPATIENT
Start: 2021-10-11 | End: 2021-10-11 | Stop reason: HOSPADM

## 2021-10-11 RX ORDER — ONDANSETRON 2 MG/ML
4 INJECTION INTRAMUSCULAR; INTRAVENOUS AS NEEDED
Status: DISCONTINUED | OUTPATIENT
Start: 2021-10-11 | End: 2021-10-11 | Stop reason: HOSPADM

## 2021-10-11 RX ORDER — FENTANYL CITRATE 50 UG/ML
INJECTION, SOLUTION INTRAMUSCULAR; INTRAVENOUS AS NEEDED
Status: DISCONTINUED | OUTPATIENT
Start: 2021-10-11 | End: 2021-10-11 | Stop reason: HOSPADM

## 2021-10-11 RX ORDER — LIDOCAINE HYDROCHLORIDE 10 MG/ML
0.1 INJECTION, SOLUTION EPIDURAL; INFILTRATION; INTRACAUDAL; PERINEURAL AS NEEDED
Status: DISCONTINUED | OUTPATIENT
Start: 2021-10-11 | End: 2021-10-11 | Stop reason: HOSPADM

## 2021-10-11 RX ORDER — SODIUM CHLORIDE 0.9 % (FLUSH) 0.9 %
5-40 SYRINGE (ML) INJECTION EVERY 8 HOURS
Status: DISCONTINUED | OUTPATIENT
Start: 2021-10-11 | End: 2021-10-11 | Stop reason: HOSPADM

## 2021-10-11 RX ORDER — MORPHINE SULFATE 10 MG/ML
2 INJECTION, SOLUTION INTRAMUSCULAR; INTRAVENOUS
Status: DISCONTINUED | OUTPATIENT
Start: 2021-10-11 | End: 2021-10-11 | Stop reason: HOSPADM

## 2021-10-11 RX ORDER — FENTANYL CITRATE 50 UG/ML
25 INJECTION, SOLUTION INTRAMUSCULAR; INTRAVENOUS
Status: DISCONTINUED | OUTPATIENT
Start: 2021-10-11 | End: 2021-10-11 | Stop reason: HOSPADM

## 2021-10-11 RX ORDER — LIDOCAINE HYDROCHLORIDE AND EPINEPHRINE 10; 10 MG/ML; UG/ML
INJECTION, SOLUTION INFILTRATION; PERINEURAL AS NEEDED
Status: DISCONTINUED | OUTPATIENT
Start: 2021-10-11 | End: 2021-10-11 | Stop reason: HOSPADM

## 2021-10-11 RX ORDER — LIDOCAINE HYDROCHLORIDE 20 MG/ML
INJECTION, SOLUTION EPIDURAL; INFILTRATION; INTRACAUDAL; PERINEURAL AS NEEDED
Status: DISCONTINUED | OUTPATIENT
Start: 2021-10-11 | End: 2021-10-11 | Stop reason: HOSPADM

## 2021-10-11 RX ADMIN — FENTANYL CITRATE 25 MCG: 50 INJECTION, SOLUTION INTRAMUSCULAR; INTRAVENOUS at 14:55

## 2021-10-11 RX ADMIN — LIDOCAINE HYDROCHLORIDE 20 MG: 20 INJECTION, SOLUTION EPIDURAL; INFILTRATION; INTRACAUDAL; PERINEURAL at 14:21

## 2021-10-11 RX ADMIN — KETOROLAC TROMETHAMINE 15 MG: 30 INJECTION, SOLUTION INTRAMUSCULAR; INTRAVENOUS at 15:02

## 2021-10-11 RX ADMIN — FENTANYL CITRATE 25 MCG: 50 INJECTION, SOLUTION INTRAMUSCULAR; INTRAVENOUS at 14:25

## 2021-10-11 RX ADMIN — SODIUM CHLORIDE, POTASSIUM CHLORIDE, SODIUM LACTATE AND CALCIUM CHLORIDE 25 ML/HR: 600; 310; 30; 20 INJECTION, SOLUTION INTRAVENOUS at 12:56

## 2021-10-11 RX ADMIN — FENTANYL CITRATE 25 MCG: 50 INJECTION, SOLUTION INTRAMUSCULAR; INTRAVENOUS at 15:04

## 2021-10-11 RX ADMIN — PROPOFOL 50 MG: 10 INJECTION, EMULSION INTRAVENOUS at 14:31

## 2021-10-11 RX ADMIN — PROPOFOL 50 MCG/KG/MIN: 10 INJECTION, EMULSION INTRAVENOUS at 14:21

## 2021-10-11 RX ADMIN — PROPOFOL 20 MG: 10 INJECTION, EMULSION INTRAVENOUS at 14:25

## 2021-10-11 RX ADMIN — MIDAZOLAM HYDROCHLORIDE 2 MG: 1 INJECTION, SOLUTION INTRAMUSCULAR; INTRAVENOUS at 14:17

## 2021-10-11 RX ADMIN — FENTANYL CITRATE 25 MCG: 50 INJECTION, SOLUTION INTRAMUSCULAR; INTRAVENOUS at 14:30

## 2021-10-11 RX ADMIN — PROPOFOL 50 MG: 10 INJECTION, EMULSION INTRAVENOUS at 14:33

## 2021-10-11 RX ADMIN — DEXMEDETOMIDINE HYDROCHLORIDE 4 MCG: 100 INJECTION, SOLUTION, CONCENTRATE INTRAVENOUS at 14:30

## 2021-10-11 RX ADMIN — PROPOFOL 40 MG: 10 INJECTION, EMULSION INTRAVENOUS at 14:29

## 2021-10-11 RX ADMIN — PROPOFOL 20 MG: 10 INJECTION, EMULSION INTRAVENOUS at 14:22

## 2021-10-11 RX ADMIN — CEFAZOLIN 2 G: 1 INJECTION, POWDER, FOR SOLUTION INTRAMUSCULAR; INTRAVENOUS; PARENTERAL at 14:28

## 2021-10-11 RX ADMIN — PROPOFOL 20 MG: 10 INJECTION, EMULSION INTRAVENOUS at 14:27

## 2021-10-11 RX ADMIN — DEXMEDETOMIDINE HYDROCHLORIDE 6 MCG: 100 INJECTION, SOLUTION, CONCENTRATE INTRAVENOUS at 14:32

## 2021-10-11 RX ADMIN — ONDANSETRON HYDROCHLORIDE 4 MG: 2 INJECTION, SOLUTION INTRAMUSCULAR; INTRAVENOUS at 14:45

## 2021-10-11 NOTE — DISCHARGE INSTRUCTIONS
Discharge Instructions:  Hernia Repair    Dr. Florinda Hastings    Call for appointment for follow up in 2 weeks [phone]    Activity:    Walk regularly. No lifting more than 10 pounds for 6 weeks. Light aerobic activity is okay when you feel up to it. You may resume driving in five days unless still requiring narcotics for pain. Work:    You may return to work in 2 or 3 weeks to light activity. No lifting more than 10 pounds for 6 weeks. Diet:    You may resume normal diet after 24 hours. Anesthesia and narcotics may cause nausea and vomiting. If persistent please call the office. Wound Care: You have a special dressing called Dermabond. It is okay to shower and let the water run over the incisions but do not scrub the area or soak in a tub. If you have a small amount of drainage you may place a dry bandage over the wound and change it daily. If you experience a lot of drainage, develop redness around the wound, or a fever over 101 F occurs please call the office. May use ice over incision for comfort. Medications:    Resume home medications as indicated on the Medical Reconciliation form. Aspirin and Coumadin can be restarted immediately if you were taking them preoperatively. If taking Plavix do not restart it until post operative day 2. Pain medications:  Due to rash from ibuprofen do not use Non steroidal antiinflammatories. A narcotic prescription will also be given for breakthrough pain. Over the counter stool softeners and laxatives may be used if needed. Do not hesitate to call with questions or concerns. DO NOT TAKE too much TYLENOL/ACETAMINOPHEN WITH PERCOCET. Suggest 500mg Acetaminophen every 6 hours in case you need Percocet which has 325mg Acetaminophen. TAKE NARCOTIC PAIN MEDICATIONS WITH FOOD     Narcotics tend to be constipating, we suggest taking a stool softener such as Colace or Miralax (follow package instructions).     DO NOT DRIVE WHILE TAKING NARCOTIC PAIN MEDICATIONS. DO NOT TAKE SLEEPING MEDICATIONS OR ANTIANXIETY MEDICATIONS WHILE TAKING NARCOTIC PAIN MEDICATIONS,  ESPECIALLY THE NIGHT OF ANESTHESIA! CPAP PATIENTS BE SURE TO WEAR MACHINE WHENEVER NAPPING OR SLEEPING! DISCHARGE SUMMARY from Nurse    The following personal items collected during your admission are returned to you:   Dental Appliance: Dental Appliances: Lowers, Uppers, Partials, At home  Vision: Visual Aid: None  Hearing Aid:    Jewelry: Jewelry: None  Clothing: Clothing: With patient  Other Valuables: Other Valuables: Cell Phone, Tino Ponce, With patient  Valuables sent to safe:        PATIENT INSTRUCTIONS:    After General Anesthesia or Intravenous Sedation, for 24 hours or while taking prescription Narcotics:        Someone should be with you for the next 24 hours. For your own safety, a responsible adult must drive you home. · Limit your activities  · Recommended activity: Rest today, up with assistance today. Do not climb stairs or shower unattended for the next 24 hours. · Please start with a soft bland diet and advance as tolerated (no nausea) to regular diet. · If you have a sore throat you should try the following: fluids, warm salt water gargles, or throat lozenges. If it does not improve after several days please follow up with your primary physician. · Do not drive and operate hazardous machinery  · Do not make important personal or business decisions  · Do  not drink alcoholic beverages  · If you have not urinated within 8 hours after discharge, please contact your surgeon on call.     Report the following to your surgeon:  · Excessive pain, swelling, redness or odor of or around the surgical area  · Temperature over 100.5  · Nausea and vomiting lasting longer than 4 hours or if unable to take medications  · Any signs of decreased circulation or nerve impairment to extremity: change in color, persistent  numbness, tingling, coldness or increase pain      · You will receive a Post Operative Call from one of the Recovery Room Nurses on the day after your surgery to check on you. It is very important for us to know how you are recovering after your surgery. If you have an issue or need to speak with someone, please call your surgeon, do not wait for the post operative call. · You may receive an e-mail or letter in the mail from [employer] regarding your experience with us in the Ambulatory Surgery Unit. Your feedback is valuable to us and we appreciate your participation in the survey. · If the above instructions are not adequate or you are having problems after your surgery, call the physician at their office number. · We wish you a speedy recovery ? What to do at Home:      *  Please give a list of your current medications to your Primary Care Provider. *  Please update this list whenever your medications are discontinued, doses are      changed, or new medications (including over-the-counter products) are added. *  Please carry medication information at all times in case of emergency situations. If you have not received your influenza and/or pneumococcal vaccine, please follow up with your primary care physician. The discharge information has been reviewed with the patient and caregiver. The patient and caregiver verbalized understanding.

## 2021-10-11 NOTE — PERIOP NOTES
Bañuelos Cons  1960  [id number]    Situation:  Verbal report given from: RN and CRNa  Procedure: Procedure(s):  LEFT INGUINAL HERNIA REPAIR WITH MESH    Background:    Preoperative diagnosis: LEFT INGUINAL HERNIA    Postoperative diagnosis: LEFT INGUINAL HERNIA    :  Dr. Sheri Zimmerman    Assistant(s): Circ-1: Parker Rivera RN  Scrub RN-1: Cheryl Cohen RN  Surg Asst-1: Som Ren    Specimens:   ID Type Source Tests Collected by Time Destination   1 : Left inguinal hernia sac Preservative Inguinal  Luis Manuel Aleman MD 10/11/2021 1448 Pathology       Assessment:  Intra-procedure medications         Anesthesia gave intra-procedure sedation and medications, see anesthesia flow sheet     Intravenous fluids: LR@ KVO     Vital signs stable. Pt denies pain or chill. Very talkative but appropriate      Recommendation:    Permission to share finding with cousin-Lenore : yes

## 2021-10-11 NOTE — OP NOTES
[address]  OPERATIVE REPORT    Name:  Nixon Huber  MR#:  [id number]  :  1960  ACCOUNT #:  [de-identified]  DATE OF SERVICE:  10/11/2021    PREOPERATIVE DIAGNOSIS:  Symptomatic left inguinal hernia. POSTOPERATIVE DIAGNOSIS:  Symptomatic left inguinal hernia. PROCEDURE PERFORMED:  Left inguinal hernia repair with mesh. SURGEON:  Anatoly Luna MD    ASSISTANT:   Geovani Almanza. ANESTHESIA:  MAC.    COMPLICATIONS:  None. SPECIMENS REMOVED:  Hernia sac. IMPLANTS:  Bard Davol soft mesh 7.5 x 15 cm inguinal synthetic flat large bore, lot #[id number]. ESTIMATED BLOOD LOSS:  10 mL. FINDINGS:  Chronically scarred indirect defect. BRIEF HISTORY:  The patient is a pleasant 26-year-old gentleman with inguinal hernia. Options were discussed, elected to undergo repair. He understands the risks and benefits and wished to proceed. PROCEDURE:  The patient was taken to the operating room, placed on the operating table in the supine position, underwent IV sedation. The abdomen was prepped and draped in the usual sterile fashion. After appropriate time-out and antibiotics were given, 1% lidocaine with epinephrine was infiltrated into the skin and subcutaneous tissues 1 cm medially and 1 cm superiorly to the anterior superior iliac crest for regional nerve block as well as in the skin and subcutaneous tissues obliquely in the left groin. An oblique incision was made in the left groin. Subcutaneous tissue was dissected out sharply, crossing veins were doubly ligated and divided. Dissection was carried down through the Hermelinda's fascia down to the aponeurosis of the external oblique. The aponeurosis was then opened up along the length of fibers opening up the external ring. The cord structures were identified as well as the nerves and the more local was injected directly over the neural structures. The cord was mobilized up.   The area was cleared and a Penrose was placed around it. There was good evidence of an indirect defect. The sac was dissected away from cord structures and entered, no evidence for sliding component. A 0 silk pursestring was used for high ligation of the sac and then a suture ligature placed distal to that. The sac was then amputated off. Next, a piece of Bard soft mesh was brought into the field and a slit was down the short end for creation of a new internal ring. Interrupted 0 Prolene suture was used to tack the mesh down inferomedial to the fascia overlying the pubic tubercle, then down to Rob's ligament and up to the shelving edge of the inguinal ligament more superolaterally. Medially, it was sewn to the conjoint tendon taking special care not to entrap the nerves or any of the sutures. The tails of the mesh were wrapped around the cord structures and sewn back to themselves to create a snug, but not tight internal ring. Once that was all completed, more Marcaine was infiltrated into the skin and subcutaneous tissues. A running 3-0 Vicryl was used to approximate the aponeurosis of the external oblique creating an external ring. Another running layer of 0 Vicryl was used to approximate the Hermelinda's fascia. A running layer of 4-0 Vicryl was used to close the skin and Dermabond dressing was applied. Upon completion of the procedure, the needle, sponge, and instrument counts were correct x2. The patient had tolerated the procedure well and was brought to recovery room.         Renetta Triana MD [id number]  D:  10/11/2021 15:22  T:  10/11/2021 18:48  JOB #:  [id number]  CC:  MD Reji Hernandez MD

## 2021-10-11 NOTE — PERIOP NOTES
Permission received to review discharge instructions and discuss private health information with cordell Vargas.

## 2021-10-11 NOTE — ANESTHESIA POSTPROCEDURE EVALUATION
Procedure(s):  LEFT INGUINAL HERNIA REPAIR WITH MESH.     MAC    Anesthesia Post Evaluation      Multimodal analgesia: multimodal analgesia used between 6 hours prior to anesthesia start to PACU discharge  Patient location during evaluation: PACU  Patient participation: complete - patient participated  Level of consciousness: awake and alert  Pain score: 0  Airway patency: patent  Anesthetic complications: no  Cardiovascular status: acceptable  Respiratory status: acceptable  Hydration status: acceptable  Post anesthesia nausea and vomiting:  none  Final Post Anesthesia Temperature Assessment:  Normothermia (36.0-37.5 degrees C)      INITIAL Post-op Vital signs:   Vitals Value Taken Time   /93 10/11/21 1546   Temp 36.8 °C (98.2 °F) 10/11/21 1546   Pulse 65 10/11/21 1546   Resp 19 10/11/21 1546   SpO2 99 % 10/11/21 1546

## 2021-10-11 NOTE — ANESTHESIA PREPROCEDURE EVALUATION
Relevant Problems   RESPIRATORY SYSTEM   (+) COPD (chronic obstructive pulmonary disease) (HCC)   (+) Moderate cigarette smoker (10-19 per day)      NEUROLOGY   (+) Depression with anxiety      CARDIOVASCULAR   (+) Coronary artery disease of native artery with stable angina pectoris (HCC)   (+) Hypertension, essential      GASTROINTESTINAL   (+) GERD (gastroesophageal reflux disease)       Anesthetic History   No history of anesthetic complications            Review of Systems / Medical History  Patient summary reviewed, nursing notes reviewed and pertinent labs reviewed    Pulmonary    COPD      Smoker (1 ppd)      Comments: Multiple lung nodules   Neuro/Psych         Psychiatric history     Cardiovascular    Hypertension          CAD, PAD (chronic right SFA occlusion), cardiac stents (01/2019) and hyperlipidemia    Exercise tolerance: <4 METS  Comments: Ischemic CM    04/21 ECHO= EF 50-55%, mild MR   GI/Hepatic/Renal     GERD: well controlled           Endo/Other        Arthritis     Other Findings            Physical Exam    Airway  Mallampati: II  TM Distance: 4 - 6 cm  Neck ROM: normal range of motion   Mouth opening: Normal     Cardiovascular    Rhythm: regular  Rate: normal         Dental    Dentition: Lower partial plate and Upper partial plate  Comments: removed   Pulmonary  Breath sounds clear to auscultation               Abdominal  GI exam deferred       Other Findings            Anesthetic Plan    ASA: 3  Anesthesia type: MAC          Induction: Intravenous  Anesthetic plan and risks discussed with: Patient      Of meds x 3 days. Told pt to restart them today and that there is no need to stop heart & BP meds prior to sugery.

## 2021-10-11 NOTE — BRIEF OP NOTE
Brief Postoperative Note    Patient: Patrick Hatch  YOB: 1960  MRN: [id number]    Date of Procedure: 10/11/2021     Pre-Op Diagnosis: LEFT INGUINAL HERNIA    Post-Op Diagnosis: Same as preoperative diagnosis. Procedure(s):  LEFT INGUINAL HERNIA REPAIR WITH MESH    Surgeon(s):  William Garza MD    Surgical Assistant: Surg Asst-1: Sathish Rodriguez    Anesthesia: MAC     Estimated Blood Loss (mL): less than 50     Complications: None    Specimens:   ID Type Source Tests Collected by Time Destination   1 : Left inguinal hernia sac Preservative Inguinal  William Garza MD 10/11/2021 1448 Pathology        Implants:   Implant Name Type Inv.  Item Serial No.  Lot No. LRB No. Used Action   MESH ADEN [id number] INGUINAL POLYPR SYN FLAT L PORE MFIL - SN/A  MESH ADEN [id number] INGUINAL POLYPR SYN FLAT L PORE MFIL N/A BARD DAVOL_WD [id number] Left 1 Implanted       Drains: * No LDAs found *    Findings: indirect    Electronically Signed by Zackary Harris MD on 10/11/2021 at 3:16 PM

## 2021-10-11 NOTE — PERIOP NOTES
Pt. Alert. Denies pain or chill. Discharge instructions reviewed with caregiver and patient. Allowed and answered questions. Tolerating PO fluids. Both state ready for discharge.    1620 Discharged to car without incident with phone and ice pack

## 2021-10-11 NOTE — H&P
HISTORY OF PRESENT ILLNESS  Peg Lima is a 63 y.o. male who comes in for consultation by Dillon Loomis MD for left groin pain and possible hernia  HPI  He has had intermittent left groin pain and bulge for 2-3 months. It becomes very painful at times and has to lay down to reduce the swelling. He denies associated nausea, vomiting, diarrhea, constipation, melena, hematochezia, dysuria or hematuria. He has not had surgery in the area previously. He does not work.          Past Medical History:   Diagnosis Date    Adrenal mass, right ([address] 2014     Seen on abd CT    CAD (coronary artery disease)       calcific atherosclerosis on CT    Depression      Hypercholesterolemia      Hypertension      Multiple lung nodules              Past Surgical History:   Procedure Laterality Date    HX ORTHOPAEDIC         hand    HX ORTHOPAEDIC         foot            Family History   Problem Relation Age of Onset    Heart Disease Mother      Asthma Mother      Elevated Lipids Mother      Hypertension Mother        Social History            Tobacco Use    Smoking status: Current Every Day Smoker       Packs/day: 1.00       Types: Cigarettes    Smokeless tobacco: Never Used   Vaping Use    Vaping Use: Never used   Substance Use Topics    Alcohol use: No       Alcohol/week: 0.0 standard drinks       Comment: stopped jan 2014    Drug use: No           Current Outpatient Medications   Medication Sig    lisinopril-hydroCHLOROthiazide (PRINZIDE, ZESTORETIC) 20-12.5 mg per tablet Take 2 Tablets by mouth daily.  busPIRone (BUSPAR) 5 mg tablet Take 1 Tablet by mouth three (3) times daily (with meals).  metoprolol succinate (TOPROL-XL) 25 mg XL tablet Take 1 Tablet by mouth nightly.  amLODIPine (NORVASC) 5 mg tablet Take 1 Tablet by mouth daily.  rosuvastatin (CRESTOR) 40 mg tablet Take 1 Tablet by mouth nightly.  isosorbide mononitrate ER (IMDUR) 30 mg tablet Take 1 Tablet by mouth daily.     omeprazole (PRILOSEC) 40 mg capsule Take 1 Capsule by mouth daily as needed (gerd). Indications: gastroesophageal reflux disease    albuterol (PROVENTIL HFA, VENTOLIN HFA, PROAIR HFA) 90 mcg/actuation inhaler Take 1 Puff by inhalation every four (4) hours as needed for Wheezing. Indications: chronic obstructive pulmonary disease    ticagrelor (BRILINTA) 90 mg tablet Take 1 Tab by mouth every twelve (12) hours every twelve (12) hours. Indications: myocardial reinfarction prevention    nitroglycerin (NITROSTAT) 0.4 mg SL tablet DISSOLVE ONE TABLET UNDER THE TONGUE EVERY 5 MINUTES AS NEEDED FOR CHEST PAIN. DO NOT EXCEED A TOTAL OF 3 DOSES IN 15 MINUTES    aspirin delayed-release 81 mg tablet Take 1 Tab by mouth daily.  acetaminophen (TYLENOL EXTRA STRENGTH) 500 mg tablet Take  by mouth every six (6) hours as needed for Pain.      No current facility-administered medications for this visit.            Allergies   Allergen Reactions    Advil [Ibuprofen] Hives    Duloxetine Diarrhea    Fluoxetine Diarrhea    Lovastatin Diarrhea    Tylenol [Acetaminophen] Other (comments)       PT STATES NOT ALLERGIC TO THIS MEDICATION         Review of Systems   Constitutional: Negative for chills, diaphoresis, fever, malaise/fatigue and weight loss. HENT: Negative for congestion, ear pain and sore throat. Eyes: Negative for blurred vision and pain. Respiratory: Negative for cough, hemoptysis, sputum production, shortness of breath, wheezing and stridor. Cardiovascular: Negative for chest pain, palpitations, orthopnea, claudication, leg swelling and PND. Gastrointestinal: Positive for abdominal pain. Negative for blood in stool, constipation, diarrhea, heartburn, melena, nausea and vomiting. Genitourinary: Negative for dysuria, flank pain, frequency, hematuria and urgency. Musculoskeletal: Negative for back pain, joint pain, myalgias and neck pain. Skin: Negative for itching and rash.    Neurological: Negative for dizziness, tremors, focal weakness, seizures, weakness and headaches. Endo/Heme/Allergies: Negative for polydipsia. Psychiatric/Behavioral: Negative for depression and memory loss. The patient is not nervous/anxious.       Visit Vitals  /78 (BP 1 Location: Left arm, BP Patient Position: Sitting, BP Cuff Size: Adult)   Pulse 71   Temp 97.5 °F (36.4 °C) (Temporal)   Resp 18   Ht 5' 9\" (1.753 m)   Wt 80.7 kg (178 lb)   SpO2 97%   BMI 26.29 kg/m²         Physical Exam  Constitutional:       General: He is not in acute distress. Appearance: Normal appearance. He is well-developed. He is not diaphoretic. HENT:      Head: Normocephalic and atraumatic. Mouth/Throat:      Pharynx: No oropharyngeal exudate. Eyes:      General: No scleral icterus. Conjunctiva/sclera: Conjunctivae normal.      Pupils: Pupils are equal, round, and reactive to light. Neck:      Thyroid: No thyromegaly. Trachea: No tracheal deviation. Cardiovascular:      Rate and Rhythm: Normal rate and regular rhythm. Heart sounds: Normal heart sounds. No murmur heard. No friction rub. No gallop. Pulmonary:      Effort: Pulmonary effort is normal. No respiratory distress. Breath sounds: Normal breath sounds. No stridor. No wheezing or rales. Abdominal:      General: Bowel sounds are normal. There is no distension. Palpations: Abdomen is soft. There is no mass. Tenderness: There is no abdominal tenderness. There is no guarding or rebound. Hernia: A hernia is present. Hernia is present in the left inguinal area (reducible small/medium indirect). There is no hernia in the umbilical area, ventral area or right inguinal area. Genitourinary:     Penis: Circumcised. Testes: Normal. Cremasteric reflex is present. Musculoskeletal:         General: No tenderness. Normal range of motion. Cervical back: Normal range of motion and neck supple.    Lymphadenopathy:      Cervical: No cervical adenopathy. Skin:     General: Skin is warm and dry. Findings: No erythema or rash. Neurological:      Mental Status: He is alert and oriented to person, place, and time. Cranial Nerves: No cranial nerve deficit. Coordination: Coordination normal.   Psychiatric:         Behavior: Behavior normal.         Thought Content: Thought content normal.         Judgment: Judgment normal.            ASSESSMENT and PLAN  1. Left inguinal hernia. I explained about the anatomy and pathophysiology of hernias and the risk of incarceration and strangulation of the bowel. I explained about hernia repairs (open with and without mesh, and robotic assisted and laparoscopic with mesh). I explained the risks and benefits of repair including bleeding, infection, chronic pain, orchalgia, loss of testes, bowel or bladder injury, hernia recurrence, seroma, mesh infection requiring removal.  I explained it would be a six to eight week recuperation with no driving for 5 - 7 days, no lifting for six weeks.      2. CAD s/p PCI   Needs cardiac assessment prior to surgery  Will need to hold Brilinta perioperatively     3. Tobacco abuse  Recommended cessation     4. Not vaccinated for Covid 19  Strongly encouraged vaccination     5.  Essential hypertension. Stable on rx  6. Peripheral vascular disease. S/p stent by Dr Davey Robison   He wishes to proceed with a left inguinal hernia repair with mesh under MAC anesthesia as an outpatient      The patient was counseled at length about the risks of itzel Covid-19 in the giselle-operative and post-operative states including the recovery window of their procedure.  The patient was made aware that itzel Covid-19 after a surgical procedure may worsen their prognosis for recovering from the virus and lend to a higher morbidity and or mortality risk.  The patient was given the options of postponing their procedure.  All of the risks, benefits, and alternatives were discussed.  The patient  wishes to proceed with the procedure.     Ok for surgery by Sharmila Soriano MD FACS

## 2021-10-16 PROBLEM — Z01.810 PRE-OPERATIVE CARDIOVASCULAR EXAMINATION: Status: RESOLVED | Noted: 2021-09-16 | Resolved: 2021-10-16

## 2021-10-20 ENCOUNTER — TELEPHONE (OUTPATIENT)
Dept: SURGERY | Age: 61
End: 2021-10-20

## 2021-10-20 DIAGNOSIS — G89.18 POST-OP PAIN: Primary | ICD-10-CM

## 2021-10-20 RX ORDER — OXYCODONE AND ACETAMINOPHEN 5; 325 MG/1; MG/1
1 TABLET ORAL
Qty: 15 TABLET | Refills: 0 | Status: SHIPPED | OUTPATIENT
Start: 2021-10-20 | End: 2021-10-24

## 2021-10-20 NOTE — TELEPHONE ENCOUNTER
PT STATES HE TOOK HIS LASST PAIN PILL YESTERDAY AND IS STILL EXPERIENCING PAIN. PT WOULD LIKE LIKE A REFILL ON PAINI MED RX.

## 2021-10-20 NOTE — TELEPHONE ENCOUNTER
Called and spoke with patient he rates his pain around a 5, but he says the pain is getting better each day. I asked him is he was still having a lot of testicle swelling. He says that it has gone down but he is using ice to help with that. I also advised patient to elevate them if he feels like he needs to. He denies, fever, drainage, or warmth to the incision sites. Patient would like a refill on his pain medication sent to his pharmacy that we have on file. Pharmacy verified with patient.

## 2021-10-26 ENCOUNTER — OFFICE VISIT (OUTPATIENT)
Dept: SURGERY | Age: 61
End: 2021-10-26
Payer: MEDICAID

## 2021-10-26 VITALS
HEART RATE: 74 BPM | TEMPERATURE: 97.3 F | RESPIRATION RATE: 16 BRPM | DIASTOLIC BLOOD PRESSURE: 80 MMHG | OXYGEN SATURATION: 98 % | BODY MASS INDEX: 26.37 KG/M2 | HEIGHT: 68 IN | WEIGHT: 174 LBS | SYSTOLIC BLOOD PRESSURE: 138 MMHG

## 2021-10-26 DIAGNOSIS — Z09 POSTOPERATIVE EXAMINATION: Primary | ICD-10-CM

## 2021-10-26 PROCEDURE — 99024 POSTOP FOLLOW-UP VISIT: CPT | Performed by: SURGERY

## 2021-10-26 NOTE — LETTER
10/26/2021    Patient: Vini Garland   YOB: 1960   Date of Visit: 10/26/2021     Ally Welsh MD  [address] [facility] [address]  [address] NP  [address]  [place]    Dear MD Flavia Lucas NP,      Thank you for referring Mr. Mari Quintana to  [facility]  for evaluation. My notes for this consultation are attached. If you have questions, please do not hesitate to call me. I look forward to following your patient along with you.       Sincerely,    Orlene Kehr, MD

## 2021-10-26 NOTE — PROGRESS NOTES
Surgery  Follow up  Procedure: LIH with mesh  OR date:  10/11/2021  Path:  sac    S I feel fine, no diarrhea    Visit Vitals  /80 (BP 1 Location: Left upper arm, BP Patient Position: Sitting, BP Cuff Size: Adult)   Pulse 74   Temp 97.3 °F (36.3 °C) (Temporal)   Resp 16   Ht 5' 8\" (1.727 m)   Wt 78.9 kg (174 lb)   SpO2 98%   BMI 26.46 kg/m²       O Incisions healing well without infection   No signs of hernia    A/P Doing well   No lifting for another 4 weeks   Increase activity   RTC 6 week or prn    Balaji Zhou MD FACS

## 2021-10-26 NOTE — PROGRESS NOTES
Identified pt with two pt identifiers(name and ). Reviewed record in preparation for visit and have obtained necessary documentation. All patient medications has been reviewed. Chief Complaint   Patient presents with    Surgical Follow-up     LEFT INGUINAL HERNIA REPAIR WITH MESH 10/11       Health Maintenance Due   Topic    COVID-19 Vaccine (1)    Shingrix Vaccine Age 50> (1 of 2)    Low dose CT lung screening     Lipid Screen     Flu Vaccine (1)       Vitals:    10/26/21 0835   BP: 138/80   Pulse: 74   Resp: 16   Temp: 97.3 °F (36.3 °C)   TempSrc: Temporal   SpO2: 98%   Weight: 78.9 kg (174 lb)   Height: 5' 8\" (1.727 m)   PainSc:   0 - No pain       4. Have you been to the ER, urgent care clinic since your last visit? Hospitalized since your last visit? No    5. Have you seen or consulted any other health care providers outside of the [address] [place] [address] since your last visit? Include any pap smears or colon screening. No      Patient is accompanied by self I have received verbal consent from Jada Santos to discuss any/all medical information while they are present in the room.

## 2022-04-19 DIAGNOSIS — F41.8 DEPRESSION WITH ANXIETY: ICD-10-CM

## 2022-04-19 RX ORDER — BUSPIRONE HYDROCHLORIDE 5 MG/1
TABLET ORAL
Qty: 90 TABLET | Refills: 0 | Status: SHIPPED | OUTPATIENT
Start: 2022-04-19 | End: 2022-09-29

## 2022-08-25 ENCOUNTER — TELEPHONE (OUTPATIENT)
Dept: INTERNAL MEDICINE CLINIC | Age: 62
End: 2022-08-25

## 2022-10-14 ENCOUNTER — TELEPHONE (OUTPATIENT)
Dept: INTERNAL MEDICINE CLINIC | Age: 62
End: 2022-10-14

## 2022-10-14 NOTE — TELEPHONE ENCOUNTER
----- Message from Mirna Cheney sent at 10/14/2022 11:59 AM EDT -----  Subject: Message to Provider    QUESTIONS  Information for Provider? Pt states if any cancellations prior to 1/12 for   annual physical please call him to get in sooner  ---------------------------------------------------------------------------  --------------  [id number] [employer]  [phone]; OK to leave message on voicemail  ---------------------------------------------------------------------------  --------------  SCRIPT ANSWERS  Relationship to Patient?  Self

## 2022-10-24 RX ORDER — ALBUTEROL SULFATE 90 UG/1
AEROSOL, METERED RESPIRATORY (INHALATION)
Qty: 18 G | Refills: 5 | Status: SHIPPED | OUTPATIENT
Start: 2022-10-24

## 2022-10-24 NOTE — TELEPHONE ENCOUNTER
PCP: Ti Heart MD     Last appt: 9/13/2022     Future Appointments   Date Time Provider Landry Daugherty   1/12/2023  3:00 PM Ti Heart MD [facility] BS AMB          Requested Prescriptions     Pending Prescriptions Disp Refills    albuterol (PROVENTIL HFA, VENTOLIN HFA, PROAIR HFA) 90 mcg/actuation inhaler 18 g 0

## 2022-11-09 ENCOUNTER — TELEPHONE (OUTPATIENT)
Dept: INTERNAL MEDICINE CLINIC | Age: 62
End: 2022-11-09

## 2022-11-09 NOTE — TELEPHONE ENCOUNTER
Deputy Sunny Driscoll from [facility] called stating pt was found this morning on the floor in his room unresponsive. He was pronounced .  was calling to get information on pt's history, was able to talk to family for history.

## 2022-11-09 NOTE — TELEPHONE ENCOUNTER
Deputy Oscar Ortiz is calling from [facility], has some questions about the pt. Not sure how to identify whether the man is an actual officer or not so I told him that Young's nurse would call back when she had time.

## 2022-11-10 NOTE — TELEPHONE ENCOUNTER
Rosi Yi from [facility] called to inform that you will be receiving an electronic death certificate to sign on patient .  Patient  at home 21

## 2022-11-15 NOTE — TELEPHONE ENCOUNTER
Called and LM again for Deputy Marquis to call back to discuss circumstances before I can sign death certificate.

## 2022-11-17 ENCOUNTER — TELEPHONE (OUTPATIENT)
Dept: INTERNAL MEDICINE CLINIC | Age: 62
End: 2022-11-17

## 2022-11-17 NOTE — TELEPHONE ENCOUNTER
See my notes - have called deputy x 2 and cannot sign until he gives me more info - please let [facility] know

## 2022-11-17 NOTE — TELEPHONE ENCOUNTER
Arianna Lin from [employer] is calling to get an update on the death certificate.      [phone]

## 2022-11-17 NOTE — TELEPHONE ENCOUNTER
Chad Parker from [facility] [phone], informed that Dr Lexy Gomez has left 2 messages for deputy to call back as more information is needed. Chad Parker stated THE [facility] case number for this patient is [id number].

## 2022-11-23 ENCOUNTER — TELEPHONE (OUTPATIENT)
Dept: INTERNAL MEDICINE CLINIC | Age: 62
End: 2022-11-23

## 2022-11-23 NOTE — TELEPHONE ENCOUNTER
Paras Hinton called to follow up about the signature they need. His best number is [phone]. He stated that he might be able to answer any questions and if not then he might have another way to go about getting the information needed. I advised that she was still waiting on the deputy to return her call. Paras Hinton stated that he might have another way to get  that information. He stated that if he is not in when Dr. Sheela Hedrick calls then its ok to speak to his  Bear Byers she will know what is going on.

## 2024-12-09 NOTE — PATIENT INSTRUCTIONS
Try duloxetine starting at 30 mg once a day for 7 days, then 60 mg daily. Give us a call in about a month and let us know if this is helping. See physical medicine and rehab about the knee pain   Office visit in 6 months with fasting lab work a week before visit. DASH Diet: Care Instructions  Your Care Instructions    The DASH diet is an eating plan that can help lower your blood pressure. DASH stands for Dietary Approaches to Stop Hypertension. Hypertension is high blood pressure. The DASH diet focuses on eating foods that are high in calcium, potassium, and magnesium. These nutrients can lower blood pressure. The foods that are highest in these nutrients are fruits, vegetables, low-fat dairy products, nuts, seeds, and legumes. But taking calcium, potassium, and magnesium supplements instead of eating foods that are high in those nutrients does not have the same effect. The DASH diet also includes whole grains, fish, and poultry. The DASH diet is one of several lifestyle changes your doctor may recommend to lower your high blood pressure. Your doctor may also want you to decrease the amount of sodium in your diet. Lowering sodium while following the DASH diet can lower blood pressure even further than just the DASH diet alone. Follow-up care is a key part of your treatment and safety. Be sure to make and go to all appointments, and call your doctor if you are having problems. It's also a good idea to know your test results and keep a list of the medicines you take. How can you care for yourself at home? Following the DASH diet  · Eat 4 to 5 servings of fruit each day. A serving is 1 medium-sized piece of fruit, ½ cup chopped or canned fruit, 1/4 cup dried fruit, or 4 ounces (½ cup) of fruit juice. Choose fruit more often than fruit juice. · Eat 4 to 5 servings of vegetables each day.  A serving is 1 cup of lettuce or raw leafy vegetables, ½ cup of chopped or cooked vegetables, or 4 ounces (½ cup) of vegetable juice. Choose vegetables more often than vegetable juice. · Get 2 to 3 servings of low-fat and fat-free dairy each day. A serving is 8 ounces of milk, 1 cup of yogurt, or 1 ½ ounces of cheese. · Eat 6 to 8 servings of grains each day. A serving is 1 slice of bread, 1 ounce of dry cereal, or ½ cup of cooked rice, pasta, or cooked cereal. Try to choose whole-grain products as much as possible. · Limit lean meat, poultry, and fish to 2 servings each day. A serving is 3 ounces, about the size of a deck of cards. · Eat 4 to 5 servings of nuts, seeds, and legumes (cooked dried beans, lentils, and split peas) each week. A serving is 1/3 cup of nuts, 2 tablespoons of seeds, or ½ cup of cooked beans or peas. · Limit fats and oils to 2 to 3 servings each day. A serving is 1 teaspoon of vegetable oil or 2 tablespoons of salad dressing. · Limit sweets and added sugars to 5 servings or less a week. A serving is 1 tablespoon jelly or jam, ½ cup sorbet, or 1 cup of lemonade. · Eat less than 2,300 milligrams (mg) of sodium a day. If you limit your sodium to 1,500 mg a day, you can lower your blood pressure even more. Tips for success  · Start small. Do not try to make dramatic changes to your diet all at once. You might feel that you are missing out on your favorite foods and then be more likely to not follow the plan. Make small changes, and stick with them. Once those changes become habit, add a few more changes. · Try some of the following:  ¨ Make it a goal to eat a fruit or vegetable at every meal and at snacks. This will make it easy to get the recommended amount of fruits and vegetables each day. ¨ Try yogurt topped with fruit and nuts for a snack or healthy dessert. ¨ Add lettuce, tomato, cucumber, and onion to sandwiches. ¨ Combine a ready-made pizza crust with low-fat mozzarella cheese and lots of vegetable toppings.  Try using tomatoes, squash, spinach, broccoli, carrots, cauliflower, and onions. ¨ Have a variety of cut-up vegetables with a low-fat dip as an appetizer instead of chips and dip. ¨ Sprinkle sunflower seeds or chopped almonds over salads. Or try adding chopped walnuts or almonds to cooked vegetables. ¨ Try some vegetarian meals using beans and peas. Add garbanzo or kidney beans to salads. Make burritos and tacos with mashed chun beans or black beans. Where can you learn more? Go to http://nehemiah-nadia.info/. Enter S683 in the search box to learn more about \"DASH Diet: Care Instructions. \"  Current as of: September 21, 2016  Content Version: 11.4  © 5859-3513 [employer], [employer]. Care instructions adapted under license by [employer] (which disclaims liability or warranty for this information). If you have questions about a medical condition or this instruction, always ask your healthcare professional. [place] [address] any warranty or liability for your use of this information. no

## (undated) DEVICE — KIT ACCS INTRO 4FR L10CM NDL 21GA L7CM GWIRE L40CM

## (undated) DEVICE — SUTURE VCRL SZ 4-0 L27IN ABSRB UD L19MM PS-2 3/8 CIR PRIM J426H

## (undated) DEVICE — DECANTER FLD L85IN IV FLX TBNG CLMP DLP

## (undated) DEVICE — HEX-LOCKING BLADE ELECTRODE: Brand: EDGE

## (undated) DEVICE — KIT ANGIOGRAPHY CUST MRMC

## (undated) DEVICE — INTENDED FOR TISSUE SEPARATION, AND OTHER PROCEDURES THAT REQUIRE A SHARP SURGICAL BLADE TO PUNCTURE OR CUT.: Brand: BARD-PARKER ® CARBON RIB-BACK BLADES

## (undated) DEVICE — SPONGE: SPECIALTY PEANUT XR 100/CS: Brand: MEDICAL ACTION INDUSTRIES

## (undated) DEVICE — CATHETER ANGIO PIG 0.052 INX5 FRX65 CM PERFORMA

## (undated) DEVICE — DRAIN SURG PENROSE 0.25X18 IN CLOSED WND DRAINAGE PREM SIL

## (undated) DEVICE — CONTINU-FLO SOLUTION SET, 2 INJECTION SITES, MALE LUER LOCK ADAPTER WITH RETRACTABLE COLLAR, LARGE BORE STOPCOCK WITH ROTATING MALE LUER LOCK EXTENSION SET, 2 INJECTION SITES, MALE LUER LOCK ADAPTER WITH RETRACTABLE COLLAR: Brand: INTERLINK/CONTINU-FLO

## (undated) DEVICE — DRESSING HEMSTAT W4XL4IN 4 PLY WHT IMPREG KAOLIN HYDRPHLC

## (undated) DEVICE — AIRLIFE™  ADULT CUSHION NASAL CANNULA WITH 7 FOOT (2.1 M) CRUSH-RESISTANT OXYGEN TUBING, AND U/CONNECT-IT ADAPTER: Brand: AIRLIFE™

## (undated) DEVICE — ICE PK EYE 4 1/2INX10IN (15/BX 2BX/CS

## (undated) DEVICE — SUTURE PROL SZ 0 L30IN NONABSORBABLE BLU L26MM CT-2 1/2 CIR 8412H

## (undated) DEVICE — NEEDLE HYPO 25GA L1.5IN BVL ORIENTED ECLIPSE

## (undated) DEVICE — BLADE ASSEMB CLP HAIR FINE --

## (undated) DEVICE — DRAPE PRB US TRNSDCR 6X96IN --

## (undated) DEVICE — GOWN,SIRUS,NONRNF,SETINSLV,2XL,18/CS: Brand: MEDLINE

## (undated) DEVICE — SYR POWER 150ML 8IN FILL TUBE --

## (undated) DEVICE — KENDALL DL ECG CABLE AND LEAD WIRE SYSTEM, 3-LEAD, SINGLE PATIENT USE: Brand: KENDALL

## (undated) DEVICE — SUTURE VCRL SZ 3-0 L54IN ABSRB VLT LIGAPAK REEL NDL J205G

## (undated) DEVICE — 3M™ TEGADERM™ TRANSPARENT FILM DRESSING FRAME STYLE, 1626W, 4 IN X 4-3/4 IN (10 CM X 12 CM), 50/CT 4CT/CASE: Brand: 3M™ TEGADERM™

## (undated) DEVICE — 3M™ TEGADERM™ TRANSPARENT FILM DRESSING FRAME STYLE, 1624W, 2-3/8 IN X 2-3/4 IN (6 CM X 7 CM), 100/CT 4CT/CASE: Brand: 3M™ TEGADERM™

## (undated) DEVICE — SUT SLK 0 30IN SH BLK --

## (undated) DEVICE — ROCKER SWITCH PENCIL HOLSTER: Brand: VALLEYLAB

## (undated) DEVICE — GLOVE ORANGE PI 7 1/2   MSG9075

## (undated) DEVICE — TORCON NB, ADVANTAGE CATHETER: Brand: TORCON NB

## (undated) DEVICE — CHEST/BREAST-MRMCASU: Brand: MEDLINE INDUSTRIES, INC.

## (undated) DEVICE — PACK PROCEDURE SURG HRT CATH

## (undated) DEVICE — HI-TORQUE VERSACORE MODIFIED J GUIDE WIRE SYSTEM 260 CM: Brand: HI-TORQUE VERSACORE

## (undated) DEVICE — PINNACLE INTRODUCER SHEATH: Brand: PINNACLE

## (undated) DEVICE — SUTURE VCRL SZ 3-0 L27IN ABSRB UD L26MM SH 1/2 CIR J416H